# Patient Record
Sex: FEMALE | Race: BLACK OR AFRICAN AMERICAN | NOT HISPANIC OR LATINO
[De-identification: names, ages, dates, MRNs, and addresses within clinical notes are randomized per-mention and may not be internally consistent; named-entity substitution may affect disease eponyms.]

---

## 2017-10-23 ENCOUNTER — TRANSCRIPTION ENCOUNTER (OUTPATIENT)
Age: 77
End: 2017-10-23

## 2017-10-23 ENCOUNTER — EMERGENCY (EMERGENCY)
Facility: HOSPITAL | Age: 77
LOS: 1 days | Discharge: HOME | End: 2017-10-23
Attending: STUDENT IN AN ORGANIZED HEALTH CARE EDUCATION/TRAINING PROGRAM
Payer: MEDICAID

## 2017-10-23 VITALS
HEART RATE: 86 BPM | WEIGHT: 153 LBS | SYSTOLIC BLOOD PRESSURE: 135 MMHG | DIASTOLIC BLOOD PRESSURE: 80 MMHG | OXYGEN SATURATION: 99 % | HEIGHT: 64 IN | TEMPERATURE: 99 F | RESPIRATION RATE: 20 BRPM

## 2017-10-23 LAB
BASE EXCESS BLDV CALC-SCNC: 7.1 MMOL/L — HIGH (ref -3–3)
BLOOD GAS COMMENTS, VENOUS: SIGNIFICANT CHANGE UP
EOSINOPHIL # BLD AUTO: 0 K/UL — SIGNIFICANT CHANGE UP (ref 0–0.5)
EOSINOPHIL NFR BLD AUTO: 0.2 % — SIGNIFICANT CHANGE UP (ref 0–6)
GAS PNL BLDV: SIGNIFICANT CHANGE UP
HCO3 BLDV-SCNC: 30 MMOL/L — HIGH (ref 20–26)
HCT VFR BLD CALC: 36.4 % — LOW (ref 37–47)
HGB BLD-MCNC: 12.5 G/DL — SIGNIFICANT CHANGE UP (ref 12–16)
LYMPHOCYTES # BLD AUTO: 3.7 K/UL — SIGNIFICANT CHANGE UP (ref 1–4.8)
LYMPHOCYTES # BLD AUTO: 45 % — SIGNIFICANT CHANGE UP (ref 20–55)
MCHC RBC-ENTMCNC: 28.9 PG — SIGNIFICANT CHANGE UP (ref 27–31)
MCHC RBC-ENTMCNC: 34.3 G/DL — SIGNIFICANT CHANGE UP (ref 32–36)
MCV RBC AUTO: 84.3 FL — SIGNIFICANT CHANGE UP (ref 81–99)
MONOCYTES # BLD AUTO: 0.6 K/UL — SIGNIFICANT CHANGE UP (ref 0–0.8)
MONOCYTES NFR BLD AUTO: 7.5 % — SIGNIFICANT CHANGE UP (ref 3–10)
NEUTROPHILS # BLD AUTO: 3.8 K/UL — SIGNIFICANT CHANGE UP (ref 1.8–8)
NEUTROPHILS NFR BLD AUTO: 47.2 % — SIGNIFICANT CHANGE UP (ref 37–73)
NT-PROBNP SERPL-SCNC: 117 PG/ML — SIGNIFICANT CHANGE UP (ref 0–300)
PCO2 BLDV: 47 MMHG — SIGNIFICANT CHANGE UP (ref 35–50)
PH BLDV: 7.44 — SIGNIFICANT CHANGE UP (ref 7.35–7.45)
PLATELET # BLD AUTO: 258 K/UL — SIGNIFICANT CHANGE UP (ref 150–400)
PO2 BLDV: 36 MMHG — SIGNIFICANT CHANGE UP (ref 25–45)
RBC # BLD: 4.32 M/UL — LOW (ref 4.4–5.2)
RBC # FLD: 12.6 % — SIGNIFICANT CHANGE UP (ref 11–15.6)
SAO2 % BLDV: 67 % — SIGNIFICANT CHANGE UP (ref 67–88)
WBC # BLD: 8.1 K/UL — SIGNIFICANT CHANGE UP (ref 4.8–10.8)
WBC # FLD AUTO: 8.1 K/UL — SIGNIFICANT CHANGE UP (ref 4.8–10.8)

## 2017-10-23 PROCEDURE — 93010 ELECTROCARDIOGRAM REPORT: CPT

## 2017-10-23 PROCEDURE — 99285 EMERGENCY DEPT VISIT HI MDM: CPT

## 2017-10-23 PROCEDURE — 71010: CPT | Mod: 26

## 2017-10-23 RX ORDER — IPRATROPIUM/ALBUTEROL SULFATE 18-103MCG
3 AEROSOL WITH ADAPTER (GRAM) INHALATION ONCE
Qty: 0 | Refills: 0 | Status: COMPLETED | OUTPATIENT
Start: 2017-10-23 | End: 2017-10-23

## 2017-10-23 RX ADMIN — Medication 3 MILLILITER(S): at 21:57

## 2017-10-23 NOTE — ED PROVIDER NOTE - PROGRESS NOTE DETAILS
Labs and CT are as noted. Patient has been resting comfortably. CT is noted but patient denies and recent signifcant meal ingestion. Given history of worsening cough, increase early satiety, dyspnea, and increase belching patient symptoms secondary to worsening GI condition. Discussed with patient and daughter that symptoms seem to be secondary to GI condition; slow motility. Patient will need to follow-up with GI for further evaluation and management. Labs and CT are as noted. Patient has been resting comfortably. CT is noted but patient denies and recent significant meal ingestion. Given history of worsening cough, increase early satiety, dyspnea, and increase belching patient symptoms secondary to worsening GI condition. Discussed with patient and daughter that symptoms seem to be secondary to GI condition; slow motility. Patient will need to follow-up with GI for further evaluation and management.

## 2017-10-23 NOTE — ED ADULT NURSE NOTE - DISCHARGE TEACHING
performed by jordin pt to f[facility]

## 2017-10-23 NOTE — ED STATDOCS - PROGRESS NOTE DETAILS
77 year old female presenting to the ED complaining of shortness of breath that onset 3 days ago. Pt's family also states that the pt has had a cough for months with posttussive vomiting, but denies having chest pain or dizziness. Her family states that the pt said her breathing worsened at approximately 1900 today. Pt's family also states that the pt has not been eating normally x 1 month from early satiety, but has been drinking normally. As per family, pt has PMHx of thyroid condition and pacemaker placement, but has no hx of COPD, CHF, or fibrosis. Pt states that she has visited her PMD for her sx. She states that she has never smoked. Will transfer to the main ED for further evaluation by another provider.   PMD: Dr. Rose  Cardiologist: Dr. Garner

## 2017-10-23 NOTE — ED PROVIDER NOTE - OBJECTIVE STATEMENT
78 y/o F PMHx pacemaker, DM and HTN presents to ED c/o constant difficulty breathing onset 1700 today. Difficulty breathing is exacerbated with laying down. Was sitting down when this episode onset. States has had difficulty breathing previously just not as bad as today. Daughter reports mother lives alone, when episode onset mother called her she went to her house and brought her to ED. Also states pt has had a cough for months which causes her to vomit. Pt has went to the doctors and was placed on Flonase and nasal spray for cough. On Omeprazole and Lisinopril. Denies cigarette usage, drug usage, N/D, fever, chills, CP, HA, numbness, tingling and abd pain.  PCP: Dr. Rose  Cardiologist:  78 y/o F PMHx pacemaker, DM and HTN presents to ED c/o constant difficulty breathing onset 1700 today. Difficulty breathing is exacerbated with laying down. Was sitting down when this episode onset. States has had difficulty breathing previously just not as bad as today. Daughter reports mother lives alone, when episode onset mother called her she went to her house and brought her to ED. Also states pt has had a cough for months which as times caused vomiting. Pt has went to the doctors and was placed on Flonase and nasal spray for cough.  She also states that she has difficulty with swallowing foods, either causing a sensation of full after a couple of bites or recurrent belching. Also occurring over the past several months. On Omeprazole and Lisinopril. Denies cigarette usage, drug usage, N/D, fever, chills, CP, HA, numbness, tingling and abd pain.  PCP: Dr. Rose  Cardiologist:

## 2017-10-23 NOTE — ED ADULT NURSE NOTE - OBJECTIVE STATEMENT
76y/o female c/o multiple symptoms. Pt states she began to feel SOB this evening. Pt AOx3, resp even unlabored, bilateral clear resp at this time. Pt states to have had episodes of emesis frequently for many weeks. NSR. pt hypertensive. will continue to monitor

## 2017-10-24 VITALS
RESPIRATION RATE: 18 BRPM | OXYGEN SATURATION: 100 % | TEMPERATURE: 98 F | HEART RATE: 93 BPM | SYSTOLIC BLOOD PRESSURE: 142 MMHG | DIASTOLIC BLOOD PRESSURE: 88 MMHG

## 2017-10-24 PROBLEM — Z00.00 ENCOUNTER FOR PREVENTIVE HEALTH EXAMINATION: Status: ACTIVE | Noted: 2017-10-24

## 2017-10-24 LAB
ALBUMIN SERPL ELPH-MCNC: 4.4 G/DL — SIGNIFICANT CHANGE UP (ref 3.3–5.2)
ALP SERPL-CCNC: 97 U/L — SIGNIFICANT CHANGE UP (ref 40–120)
ALT FLD-CCNC: 7 U/L — SIGNIFICANT CHANGE UP
ANION GAP SERPL CALC-SCNC: 16 MMOL/L — SIGNIFICANT CHANGE UP (ref 5–17)
AST SERPL-CCNC: 15 U/L — SIGNIFICANT CHANGE UP
BILIRUB SERPL-MCNC: 0.5 MG/DL — SIGNIFICANT CHANGE UP (ref 0.4–2)
BUN SERPL-MCNC: 11 MG/DL — SIGNIFICANT CHANGE UP (ref 8–20)
CALCIUM SERPL-MCNC: 10.3 MG/DL — HIGH (ref 8.6–10.2)
CHLORIDE SERPL-SCNC: 88 MMOL/L — LOW (ref 98–107)
CO2 SERPL-SCNC: 28 MMOL/L — SIGNIFICANT CHANGE UP (ref 22–29)
CREAT SERPL-MCNC: 0.9 MG/DL — SIGNIFICANT CHANGE UP (ref 0.5–1.3)
GLUCOSE SERPL-MCNC: 171 MG/DL — HIGH (ref 70–115)
LIDOCAIN IGE QN: 27 U/L — SIGNIFICANT CHANGE UP (ref 22–51)
POTASSIUM SERPL-MCNC: 3.6 MMOL/L — SIGNIFICANT CHANGE UP (ref 3.5–5.3)
POTASSIUM SERPL-SCNC: 3.6 MMOL/L — SIGNIFICANT CHANGE UP (ref 3.5–5.3)
PROT SERPL-MCNC: 8.4 G/DL — SIGNIFICANT CHANGE UP (ref 6.6–8.7)
SODIUM SERPL-SCNC: 132 MMOL/L — LOW (ref 135–145)
TROPONIN T SERPL-MCNC: <0.01 NG/ML — SIGNIFICANT CHANGE UP (ref 0–0.06)

## 2017-10-24 PROCEDURE — 83880 ASSAY OF NATRIURETIC PEPTIDE: CPT

## 2017-10-24 PROCEDURE — 71275 CT ANGIOGRAPHY CHEST: CPT

## 2017-10-24 PROCEDURE — 85027 COMPLETE CBC AUTOMATED: CPT

## 2017-10-24 PROCEDURE — 93005 ELECTROCARDIOGRAM TRACING: CPT

## 2017-10-24 PROCEDURE — 71275 CT ANGIOGRAPHY CHEST: CPT | Mod: 26

## 2017-10-24 PROCEDURE — 36415 COLL VENOUS BLD VENIPUNCTURE: CPT

## 2017-10-24 PROCEDURE — 74177 CT ABD & PELVIS W/CONTRAST: CPT

## 2017-10-24 PROCEDURE — 80053 COMPREHEN METABOLIC PANEL: CPT

## 2017-10-24 PROCEDURE — 84484 ASSAY OF TROPONIN QUANT: CPT

## 2017-10-24 PROCEDURE — 83690 ASSAY OF LIPASE: CPT

## 2017-10-24 PROCEDURE — 99284 EMERGENCY DEPT VISIT MOD MDM: CPT | Mod: 25

## 2017-10-24 PROCEDURE — 94640 AIRWAY INHALATION TREATMENT: CPT

## 2017-10-24 PROCEDURE — 71045 X-RAY EXAM CHEST 1 VIEW: CPT

## 2017-10-24 PROCEDURE — 74177 CT ABD & PELVIS W/CONTRAST: CPT | Mod: 26

## 2017-10-24 PROCEDURE — 82803 BLOOD GASES ANY COMBINATION: CPT

## 2017-10-24 RX ORDER — METOCLOPRAMIDE HCL 10 MG
1 TABLET ORAL
Qty: 28 | Refills: 0
Start: 2017-10-24 | End: 2017-10-31

## 2017-10-24 RX ORDER — OMEPRAZOLE 10 MG/1
1 CAPSULE, DELAYED RELEASE ORAL
Qty: 14 | Refills: 0
Start: 2017-10-24 | End: 2017-11-07

## 2019-09-06 PROBLEM — Z95.0 PRESENCE OF CARDIAC PACEMAKER: Chronic | Status: ACTIVE | Noted: 2017-10-25

## 2019-09-06 PROBLEM — E11.9 TYPE 2 DIABETES MELLITUS WITHOUT COMPLICATIONS: Chronic | Status: ACTIVE | Noted: 2017-10-25

## 2019-09-06 PROBLEM — I10 ESSENTIAL (PRIMARY) HYPERTENSION: Chronic | Status: ACTIVE | Noted: 2017-10-25

## 2019-10-04 ENCOUNTER — APPOINTMENT (OUTPATIENT)
Dept: NEPHROLOGY | Facility: CLINIC | Age: 79
End: 2019-10-04
Payer: MEDICARE

## 2019-10-04 VITALS
HEIGHT: 64 IN | WEIGHT: 160 LBS | OXYGEN SATURATION: 99 % | HEART RATE: 70 BPM | SYSTOLIC BLOOD PRESSURE: 164 MMHG | BODY MASS INDEX: 27.31 KG/M2 | DIASTOLIC BLOOD PRESSURE: 70 MMHG

## 2019-10-04 DIAGNOSIS — E11.21 TYPE 2 DIABETES MELLITUS WITH DIABETIC NEPHROPATHY: ICD-10-CM

## 2019-10-04 DIAGNOSIS — Z82.49 FAMILY HISTORY OF ISCHEMIC HEART DISEASE AND OTHER DISEASES OF THE CIRCULATORY SYSTEM: ICD-10-CM

## 2019-10-04 PROCEDURE — 99205 OFFICE O/P NEW HI 60 MIN: CPT | Mod: 25

## 2019-10-04 PROCEDURE — 36415 COLL VENOUS BLD VENIPUNCTURE: CPT

## 2019-10-04 NOTE — PHYSICAL EXAM
[General Appearance - Alert] : alert [General Appearance - In No Acute Distress] : in no acute distress [PERRL With Normal Accommodation] : pupils were equal in size, round, and reactive to light [Sclera] : the sclera and conjunctiva were normal [Extraocular Movements] : extraocular movements were intact [Neck Appearance] : the appearance of the neck was normal [Oropharynx] : the oropharynx was normal [Outer Ear] : the ears and nose were normal in appearance [Neck Cervical Mass (___cm)] : no neck mass was observed [Thyroid Diffuse Enlargement] : the thyroid was not enlarged [Jugular Venous Distention Increased] : there was no jugular-venous distention [Thyroid Nodule] : there were no palpable thyroid nodules [Auscultation Breath Sounds / Voice Sounds] : lungs were clear to auscultation bilaterally [Heart Sounds] : normal S1 and S2 [Heart Rate And Rhythm] : heart rate was normal and rhythm regular [Heart Sounds Gallop] : no gallops [Murmurs] : no murmurs [Full Pulse] : the pedal pulses are present [Heart Sounds Pericardial Friction Rub] : no pericardial rub [Edema] : there was no peripheral edema [Breast Appearance] : normal in appearance [Breast Palpation Mass] : no palpable masses [Bowel Sounds] : normal bowel sounds [Abdomen Soft] : soft [Abdomen Tenderness] : non-tender [Abdomen Mass (___ Cm)] : no abdominal mass palpated [Normal Sphincter Tone] : normal sphincter tone [No Rectal Mass] : no rectal mass [External Female Genitalia] : normal external genitalia [Urethral Meatus] : normal urethra [Urinary Bladder Findings] : the bladder was normal on palpation [Cervical Lymph Nodes Enlarged Posterior Bilaterally] : posterior cervical [Cervical Lymph Nodes Enlarged Anterior Bilaterally] : anterior cervical [Supraclavicular Lymph Nodes Enlarged Bilaterally] : supraclavicular [Axillary Lymph Nodes Enlarged Bilaterally] : axillary [Femoral Lymph Nodes Enlarged Bilaterally] : femoral [Inguinal Lymph Nodes Enlarged Bilaterally] : inguinal [No CVA Tenderness] : no ~M costovertebral angle tenderness [No Spinal Tenderness] : no spinal tenderness [Abnormal Walk] : normal gait [Motor Tone] : muscle strength and tone were normal [Nail Clubbing] : no clubbing  or cyanosis of the fingernails [Musculoskeletal - Swelling] : no joint swelling seen [Skin Color & Pigmentation] : normal skin color and pigmentation [Skin Turgor] : normal skin turgor [] : no rash [Deep Tendon Reflexes (DTR)] : deep tendon reflexes were 2+ and symmetric [Sensation] : the sensory exam was normal to light touch and pinprick [No Focal Deficits] : no focal deficits [Oriented To Time, Place, And Person] : oriented to person, place, and time [Affect] : the affect was normal [Impaired Insight] : insight and judgment were intact [FreeTextEntry1] : + PPM,  [Occult Blood Positive] : stool was negative for occult blood

## 2019-10-04 NOTE — HISTORY OF PRESENT ILLNESS
[FreeTextEntry1] : DM - 2 ,  Ref. here for evaln., of CKD - 3, Proteinuria, \par \par Meds Reviewed,\par \par Lives alone , + PPM,\par \par Thin & Frail,

## 2019-10-06 LAB
25(OH)D3 SERPL-MCNC: 19.8 NG/ML
ALBUMIN SERPL ELPH-MCNC: 4.6 G/DL
ANION GAP SERPL CALC-SCNC: 16 MMOL/L
APPEARANCE: ABNORMAL
BACTERIA: ABNORMAL
BASOPHILS # BLD AUTO: 0 K/UL
BASOPHILS NFR BLD AUTO: 0 %
BILIRUBIN URINE: NEGATIVE
BLOOD URINE: NORMAL
BUN SERPL-MCNC: 12 MG/DL
CALCIUM SERPL-MCNC: 9.9 MG/DL
CALCIUM SERPL-MCNC: 9.9 MG/DL
CHLORIDE SERPL-SCNC: 100 MMOL/L
CO2 SERPL-SCNC: 21 MMOL/L
COLOR: YELLOW
CREAT SERPL-MCNC: 1.05 MG/DL
CREAT SPEC-SCNC: 105 MG/DL
CREAT/PROT UR: 0.2 RATIO
EOSINOPHIL # BLD AUTO: 0.03 K/UL
EOSINOPHIL NFR BLD AUTO: 0.5 %
ESTIMATED AVERAGE GLUCOSE: 137 MG/DL
GLUCOSE QUALITATIVE U: NEGATIVE
GLUCOSE SERPL-MCNC: 233 MG/DL
HBA1C MFR BLD HPLC: 6.4 %
HCT VFR BLD CALC: 37.4 %
HGB BLD-MCNC: 11.9 G/DL
HYALINE CASTS: 0 /LPF
IMM GRANULOCYTES NFR BLD AUTO: 0.2 %
KETONES URINE: NEGATIVE
LEUKOCYTE ESTERASE URINE: ABNORMAL
LYMPHOCYTES # BLD AUTO: 2.14 K/UL
LYMPHOCYTES NFR BLD AUTO: 38.4 %
MAN DIFF?: NORMAL
MCHC RBC-ENTMCNC: 28.5 PG
MCHC RBC-ENTMCNC: 31.8 GM/DL
MCV RBC AUTO: 89.7 FL
MICROSCOPIC-UA: NORMAL
MONOCYTES # BLD AUTO: 0.41 K/UL
MONOCYTES NFR BLD AUTO: 7.3 %
NEUTROPHILS # BLD AUTO: 2.99 K/UL
NEUTROPHILS NFR BLD AUTO: 53.6 %
NITRITE URINE: NEGATIVE
PARATHYROID HORMONE INTACT: 48 PG/ML
PH URINE: 6
PHOSPHATE SERPL-MCNC: 3.3 MG/DL
PLATELET # BLD AUTO: 265 K/UL
POTASSIUM SERPL-SCNC: 3.9 MMOL/L
PROT UR-MCNC: 21 MG/DL
PROTEIN URINE: NORMAL
RBC # BLD: 4.17 M/UL
RBC # FLD: 13.4 %
RED BLOOD CELLS URINE: 5 /HPF
SODIUM SERPL-SCNC: 137 MMOL/L
SPECIFIC GRAVITY URINE: 1.01
SQUAMOUS EPITHELIAL CELLS: 24 /HPF
URINE COMMENTS: NORMAL
UROBILINOGEN URINE: NORMAL
WBC # FLD AUTO: 5.58 K/UL
WHITE BLOOD CELLS URINE: 196 /HPF

## 2019-10-06 RX ORDER — CHOLECALCIFEROL (VITAMIN D3) 1250 MCG
1.25 MG CAPSULE ORAL
Qty: 12 | Refills: 3 | Status: ACTIVE | COMMUNITY
Start: 2019-10-06 | End: 1900-01-01

## 2019-10-08 ENCOUNTER — FORM ENCOUNTER (OUTPATIENT)
Age: 79
End: 2019-10-08

## 2019-10-09 ENCOUNTER — OUTPATIENT (OUTPATIENT)
Dept: OUTPATIENT SERVICES | Facility: HOSPITAL | Age: 79
LOS: 1 days | End: 2019-10-09
Payer: MEDICARE

## 2019-10-09 ENCOUNTER — APPOINTMENT (OUTPATIENT)
Dept: ULTRASOUND IMAGING | Facility: CLINIC | Age: 79
End: 2019-10-09

## 2019-10-09 DIAGNOSIS — E11.21 TYPE 2 DIABETES MELLITUS WITH DIABETIC NEPHROPATHY: ICD-10-CM

## 2019-10-09 PROCEDURE — 76775 US EXAM ABDO BACK WALL LIM: CPT

## 2019-10-09 PROCEDURE — 76775 US EXAM ABDO BACK WALL LIM: CPT | Mod: 26

## 2020-01-01 ENCOUNTER — APPOINTMENT (OUTPATIENT)
Dept: NEPHROLOGY | Facility: CLINIC | Age: 80
End: 2020-01-01
Payer: MEDICARE

## 2020-01-01 ENCOUNTER — INPATIENT (INPATIENT)
Facility: HOSPITAL | Age: 80
LOS: 7 days | DRG: 643 | End: 2020-11-22
Attending: INTERNAL MEDICINE | Admitting: INTERNAL MEDICINE
Payer: MEDICARE

## 2020-01-01 ENCOUNTER — APPOINTMENT (OUTPATIENT)
Dept: NEPHROLOGY | Facility: CLINIC | Age: 80
End: 2020-01-01

## 2020-01-01 VITALS
HEART RATE: 75 BPM | DIASTOLIC BLOOD PRESSURE: 83 MMHG | HEIGHT: 64 IN | BODY MASS INDEX: 28.34 KG/M2 | SYSTOLIC BLOOD PRESSURE: 157 MMHG | WEIGHT: 166 LBS

## 2020-01-01 VITALS
DIASTOLIC BLOOD PRESSURE: 112 MMHG | HEART RATE: 156 BPM | RESPIRATION RATE: 18 BRPM | TEMPERATURE: 97 F | OXYGEN SATURATION: 96 % | SYSTOLIC BLOOD PRESSURE: 157 MMHG | HEIGHT: 64 IN

## 2020-01-01 DIAGNOSIS — I10 ESSENTIAL (PRIMARY) HYPERTENSION: ICD-10-CM

## 2020-01-01 DIAGNOSIS — E05.91 THYROTOXICOSIS, UNSPECIFIED WITH THYROTOXIC CRISIS OR STORM: ICD-10-CM

## 2020-01-01 DIAGNOSIS — N18.3 CHRONIC KIDNEY DISEASE, STAGE 3 (MODERATE): ICD-10-CM

## 2020-01-01 DIAGNOSIS — R94.4 ABNORMAL RESULTS OF KIDNEY FUNCTION STUDIES: ICD-10-CM

## 2020-01-01 DIAGNOSIS — R74.01 ELEVATION OF LEVELS OF LIVER TRANSAMINASE LEVELS: ICD-10-CM

## 2020-01-01 DIAGNOSIS — E11.21 TYPE 2 DIABETES MELLITUS WITH DIABETIC NEPHROPATHY: ICD-10-CM

## 2020-01-01 DIAGNOSIS — E87.2 ACIDOSIS: ICD-10-CM

## 2020-01-01 DIAGNOSIS — R57.9 SHOCK, UNSPECIFIED: ICD-10-CM

## 2020-01-01 DIAGNOSIS — J69.0 PNEUMONITIS DUE TO INHALATION OF FOOD AND VOMIT: ICD-10-CM

## 2020-01-01 DIAGNOSIS — N17.0 ACUTE KIDNEY FAILURE WITH TUBULAR NECROSIS: ICD-10-CM

## 2020-01-01 DIAGNOSIS — G93.40 ENCEPHALOPATHY, UNSPECIFIED: ICD-10-CM

## 2020-01-01 DIAGNOSIS — R73.9 HYPERGLYCEMIA, UNSPECIFIED: ICD-10-CM

## 2020-01-01 DIAGNOSIS — Z87.440 PERSONAL HISTORY OF URINARY (TRACT) INFECTIONS: ICD-10-CM

## 2020-01-01 DIAGNOSIS — J96.01 ACUTE RESPIRATORY FAILURE WITH HYPOXIA: ICD-10-CM

## 2020-01-01 DIAGNOSIS — E55.9 VITAMIN D DEFICIENCY, UNSPECIFIED: ICD-10-CM

## 2020-01-01 LAB
25(OH)D3 SERPL-MCNC: 25.3 NG/ML
A1C WITH ESTIMATED AVERAGE GLUCOSE RESULT: 4.9 % — SIGNIFICANT CHANGE UP (ref 4–5.6)
ABO RH CONFIRMATION: SIGNIFICANT CHANGE UP
ACANTHOCYTES BLD QL SMEAR: SLIGHT — SIGNIFICANT CHANGE UP
ACANTHOCYTES BLD QL SMEAR: SLIGHT — SIGNIFICANT CHANGE UP
ALBUMIN SERPL ELPH-MCNC: 1.9 G/DL — LOW (ref 3.3–5.2)
ALBUMIN SERPL ELPH-MCNC: 2 G/DL — LOW (ref 3.3–5.2)
ALBUMIN SERPL ELPH-MCNC: 2.5 G/DL — LOW (ref 3.3–5.2)
ALBUMIN SERPL ELPH-MCNC: 2.6 G/DL — LOW (ref 3.3–5.2)
ALBUMIN SERPL ELPH-MCNC: 2.9 G/DL — LOW (ref 3.3–5.2)
ALBUMIN SERPL ELPH-MCNC: 4.1 G/DL — SIGNIFICANT CHANGE UP (ref 3.3–5.2)
ALBUMIN SERPL ELPH-MCNC: 4.5 G/DL
ALP SERPL-CCNC: 173 U/L — HIGH (ref 40–120)
ALP SERPL-CCNC: 226 U/L — HIGH (ref 40–120)
ALP SERPL-CCNC: 76 U/L — SIGNIFICANT CHANGE UP (ref 40–120)
ALP SERPL-CCNC: 79 U/L — SIGNIFICANT CHANGE UP (ref 40–120)
ALP SERPL-CCNC: 96 U/L — SIGNIFICANT CHANGE UP (ref 40–120)
ALP SERPL-CCNC: 99 U/L — SIGNIFICANT CHANGE UP (ref 40–120)
ALT FLD-CCNC: 1230 U/L — HIGH
ALT FLD-CCNC: 1234 U/L — HIGH
ALT FLD-CCNC: 139 U/L — HIGH
ALT FLD-CCNC: 161 U/L — HIGH
ALT FLD-CCNC: 22 U/L — SIGNIFICANT CHANGE UP
ALT FLD-CCNC: 280 U/L — HIGH
AMMONIA BLD-MCNC: 31 UMOL/L — SIGNIFICANT CHANGE UP (ref 11–55)
AMMONIA BLD-MCNC: 51 UMOL/L — SIGNIFICANT CHANGE UP (ref 11–55)
ANION GAP SERPL CALC-SCNC: 11 MMOL/L — SIGNIFICANT CHANGE UP (ref 5–17)
ANION GAP SERPL CALC-SCNC: 11 MMOL/L — SIGNIFICANT CHANGE UP (ref 5–17)
ANION GAP SERPL CALC-SCNC: 12 MMOL/L — SIGNIFICANT CHANGE UP (ref 5–17)
ANION GAP SERPL CALC-SCNC: 15 MMOL/L — SIGNIFICANT CHANGE UP (ref 5–17)
ANION GAP SERPL CALC-SCNC: 16 MMOL/L — SIGNIFICANT CHANGE UP (ref 5–17)
ANION GAP SERPL CALC-SCNC: 18 MMOL/L — HIGH (ref 5–17)
ANION GAP SERPL CALC-SCNC: 19 MMOL/L — HIGH (ref 5–17)
ANION GAP SERPL CALC-SCNC: 20 MMOL/L
ANION GAP SERPL CALC-SCNC: 23 MMOL/L — HIGH (ref 5–17)
ANION GAP SERPL CALC-SCNC: 6 MMOL/L — SIGNIFICANT CHANGE UP (ref 5–17)
ANION GAP SERPL CALC-SCNC: 7 MMOL/L — SIGNIFICANT CHANGE UP (ref 5–17)
ANION GAP SERPL CALC-SCNC: 9 MMOL/L — SIGNIFICANT CHANGE UP (ref 5–17)
ANION GAP SERPL CALC-SCNC: 9 MMOL/L — SIGNIFICANT CHANGE UP (ref 5–17)
ANISOCYTOSIS BLD QL: SLIGHT — SIGNIFICANT CHANGE UP
APPEARANCE UR: CLEAR — SIGNIFICANT CHANGE UP
APPEARANCE UR: CLEAR — SIGNIFICANT CHANGE UP
APPEARANCE: CLEAR
APTT BLD: 21.7 SEC — LOW (ref 27.5–35.5)
APTT BLD: 23 SEC — LOW (ref 27.5–35.5)
APTT BLD: 24.1 SEC — LOW (ref 27.5–35.5)
APTT BLD: 30.4 SEC — SIGNIFICANT CHANGE UP (ref 27.5–35.5)
APTT BLD: 32.4 SEC — SIGNIFICANT CHANGE UP (ref 27.5–35.5)
AST SERPL-CCNC: 1330 U/L — HIGH
AST SERPL-CCNC: 30 U/L — SIGNIFICANT CHANGE UP
AST SERPL-CCNC: 4526 U/L — HIGH
AST SERPL-CCNC: 60 U/L — HIGH
AST SERPL-CCNC: 84 U/L — HIGH
AST SERPL-CCNC: 841 U/L — HIGH
BACTERIA # UR AUTO: ABNORMAL
BACTERIA: ABNORMAL
BASE EXCESS BLDA CALC-SCNC: -7.8 MMOL/L — LOW (ref -2–2)
BASE EXCESS BLDA CALC-SCNC: 2.4 MMOL/L — HIGH (ref -2–2)
BASE EXCESS BLDA CALC-SCNC: 6.2 MMOL/L — HIGH (ref -3–3)
BASOPHILS # BLD AUTO: 0 K/UL — SIGNIFICANT CHANGE UP (ref 0–0.2)
BASOPHILS # BLD AUTO: 0.01 K/UL — SIGNIFICANT CHANGE UP (ref 0–0.2)
BASOPHILS # BLD AUTO: 0.02 K/UL
BASOPHILS NFR BLD AUTO: 0 % — SIGNIFICANT CHANGE UP (ref 0–2)
BASOPHILS NFR BLD AUTO: 0.1 % — SIGNIFICANT CHANGE UP (ref 0–2)
BASOPHILS NFR BLD AUTO: 0.4 %
BILIRUB DIRECT SERPL-MCNC: 2.7 MG/DL — HIGH (ref 0–0.3)
BILIRUB INDIRECT FLD-MCNC: 1.1 MG/DL — HIGH (ref 0.2–1)
BILIRUB SERPL-MCNC: 0.9 MG/DL — SIGNIFICANT CHANGE UP (ref 0.4–2)
BILIRUB SERPL-MCNC: 1.2 MG/DL — SIGNIFICANT CHANGE UP (ref 0.4–2)
BILIRUB SERPL-MCNC: 1.4 MG/DL — SIGNIFICANT CHANGE UP (ref 0.4–2)
BILIRUB SERPL-MCNC: 1.5 MG/DL — SIGNIFICANT CHANGE UP (ref 0.4–2)
BILIRUB SERPL-MCNC: 2.1 MG/DL — HIGH (ref 0.4–2)
BILIRUB SERPL-MCNC: 3.8 MG/DL — HIGH (ref 0.4–2)
BILIRUB UR-MCNC: NEGATIVE — SIGNIFICANT CHANGE UP
BILIRUB UR-MCNC: NEGATIVE — SIGNIFICANT CHANGE UP
BILIRUBIN URINE: NEGATIVE
BLD GP AB SCN SERPL QL: SIGNIFICANT CHANGE UP
BLOOD GAS COMMENTS ARTERIAL: SIGNIFICANT CHANGE UP
BLOOD URINE: NEGATIVE
BUN SERPL-MCNC: 24 MG/DL — HIGH (ref 8–20)
BUN SERPL-MCNC: 27 MG/DL — HIGH (ref 8–20)
BUN SERPL-MCNC: 40 MG/DL — HIGH (ref 8–20)
BUN SERPL-MCNC: 59 MG/DL — HIGH (ref 8–20)
BUN SERPL-MCNC: 67 MG/DL — HIGH (ref 8–20)
BUN SERPL-MCNC: 74 MG/DL — HIGH (ref 8–20)
BUN SERPL-MCNC: 8 MG/DL
BUN SERPL-MCNC: 80 MG/DL — HIGH (ref 8–20)
BUN SERPL-MCNC: 83 MG/DL — HIGH (ref 8–20)
BUN SERPL-MCNC: 85 MG/DL — HIGH (ref 8–20)
BUN SERPL-MCNC: 86 MG/DL — HIGH (ref 8–20)
BUN SERPL-MCNC: 88 MG/DL — HIGH (ref 8–20)
BUN SERPL-MCNC: 92 MG/DL — HIGH (ref 8–20)
BURR CELLS BLD QL SMEAR: PRESENT — SIGNIFICANT CHANGE UP
C DIFF BY PCR RESULT: SIGNIFICANT CHANGE UP
C DIFF TOX GENS STL QL NAA+PROBE: SIGNIFICANT CHANGE UP
C NEOFORM RRNA SPEC NAA+PROBE-ACNC: SIGNIFICANT CHANGE UP
CALCIUM SERPL-MCNC: 10.1 MG/DL — SIGNIFICANT CHANGE UP (ref 8.6–10.2)
CALCIUM SERPL-MCNC: 10.5 MG/DL — HIGH (ref 8.6–10.2)
CALCIUM SERPL-MCNC: 11.8 MG/DL — HIGH (ref 8.6–10.2)
CALCIUM SERPL-MCNC: 8.3 MG/DL — LOW (ref 8.6–10.2)
CALCIUM SERPL-MCNC: 8.5 MG/DL — LOW (ref 8.6–10.2)
CALCIUM SERPL-MCNC: 8.6 MG/DL — SIGNIFICANT CHANGE UP (ref 8.6–10.2)
CALCIUM SERPL-MCNC: 8.9 MG/DL — SIGNIFICANT CHANGE UP (ref 8.6–10.2)
CALCIUM SERPL-MCNC: 9 MG/DL — SIGNIFICANT CHANGE UP (ref 8.6–10.2)
CALCIUM SERPL-MCNC: 9.2 MG/DL — SIGNIFICANT CHANGE UP (ref 8.6–10.2)
CALCIUM SERPL-MCNC: 9.3 MG/DL — SIGNIFICANT CHANGE UP (ref 8.6–10.2)
CALCIUM SERPL-MCNC: 9.9 MG/DL
CALCIUM SERPL-MCNC: 9.9 MG/DL
CHLORIDE SERPL-SCNC: 101 MMOL/L
CHLORIDE SERPL-SCNC: 104 MMOL/L — SIGNIFICANT CHANGE UP (ref 98–107)
CHLORIDE SERPL-SCNC: 104 MMOL/L — SIGNIFICANT CHANGE UP (ref 98–107)
CHLORIDE SERPL-SCNC: 105 MMOL/L — SIGNIFICANT CHANGE UP (ref 98–107)
CHLORIDE SERPL-SCNC: 106 MMOL/L — SIGNIFICANT CHANGE UP (ref 98–107)
CHLORIDE SERPL-SCNC: 107 MMOL/L — SIGNIFICANT CHANGE UP (ref 98–107)
CHLORIDE SERPL-SCNC: 108 MMOL/L — HIGH (ref 98–107)
CHLORIDE SERPL-SCNC: 109 MMOL/L — HIGH (ref 98–107)
CHLORIDE SERPL-SCNC: 112 MMOL/L — HIGH (ref 98–107)
CHLORIDE SERPL-SCNC: 113 MMOL/L — HIGH (ref 98–107)
CHLORIDE SERPL-SCNC: 114 MMOL/L — HIGH (ref 98–107)
CHOLEST SERPL-MCNC: 59 MG/DL — SIGNIFICANT CHANGE UP
CK MB CFR SERPL CALC: 1.3 NG/ML — SIGNIFICANT CHANGE UP (ref 0–6.7)
CK SERPL-CCNC: 147 U/L — SIGNIFICANT CHANGE UP (ref 25–170)
CK SERPL-CCNC: 205 U/L — HIGH (ref 25–170)
CMV DNA CSF QL NAA+PROBE: SIGNIFICANT CHANGE UP
CO2 SERPL-SCNC: 16 MMOL/L — LOW (ref 22–29)
CO2 SERPL-SCNC: 16 MMOL/L — LOW (ref 22–29)
CO2 SERPL-SCNC: 17 MMOL/L
CO2 SERPL-SCNC: 19 MMOL/L — LOW (ref 22–29)
CO2 SERPL-SCNC: 21 MMOL/L — LOW (ref 22–29)
CO2 SERPL-SCNC: 25 MMOL/L — SIGNIFICANT CHANGE UP (ref 22–29)
CO2 SERPL-SCNC: 27 MMOL/L — SIGNIFICANT CHANGE UP (ref 22–29)
CO2 SERPL-SCNC: 28 MMOL/L — SIGNIFICANT CHANGE UP (ref 22–29)
CO2 SERPL-SCNC: 28 MMOL/L — SIGNIFICANT CHANGE UP (ref 22–29)
CO2 SERPL-SCNC: 29 MMOL/L — SIGNIFICANT CHANGE UP (ref 22–29)
CO2 SERPL-SCNC: 30 MMOL/L — HIGH (ref 22–29)
COLOR SPEC: YELLOW — SIGNIFICANT CHANGE UP
COLOR SPEC: YELLOW — SIGNIFICANT CHANGE UP
COLOR: NORMAL
CREAT SERPL-MCNC: 0.49 MG/DL — LOW (ref 0.5–1.3)
CREAT SERPL-MCNC: 0.62 MG/DL — SIGNIFICANT CHANGE UP (ref 0.5–1.3)
CREAT SERPL-MCNC: 0.63 MG/DL — SIGNIFICANT CHANGE UP (ref 0.5–1.3)
CREAT SERPL-MCNC: 0.63 MG/DL — SIGNIFICANT CHANGE UP (ref 0.5–1.3)
CREAT SERPL-MCNC: 0.68 MG/DL — SIGNIFICANT CHANGE UP (ref 0.5–1.3)
CREAT SERPL-MCNC: 0.71 MG/DL — SIGNIFICANT CHANGE UP (ref 0.5–1.3)
CREAT SERPL-MCNC: 0.74 MG/DL — SIGNIFICANT CHANGE UP (ref 0.5–1.3)
CREAT SERPL-MCNC: 0.9 MG/DL
CREAT SERPL-MCNC: 0.93 MG/DL — SIGNIFICANT CHANGE UP (ref 0.5–1.3)
CREAT SERPL-MCNC: 1.24 MG/DL — SIGNIFICANT CHANGE UP (ref 0.5–1.3)
CREAT SERPL-MCNC: 1.36 MG/DL — HIGH (ref 0.5–1.3)
CREAT SERPL-MCNC: 1.93 MG/DL — HIGH (ref 0.5–1.3)
CREAT SERPL-MCNC: 1.93 MG/DL — HIGH (ref 0.5–1.3)
CREAT SPEC-SCNC: 61 MG/DL
CREAT/PROT UR: 0.1 RATIO
CSF PCR RESULT: SIGNIFICANT CHANGE UP
CULTURE RESULTS: SIGNIFICANT CHANGE UP
DACRYOCYTES BLD QL SMEAR: SLIGHT — SIGNIFICANT CHANGE UP
DIFF PNL FLD: ABNORMAL
DIFF PNL FLD: ABNORMAL
E COLI K1 DNA CSF QL NAA+NON-PROBE: SIGNIFICANT CHANGE UP
ELLIPTOCYTES BLD QL SMEAR: SLIGHT — SIGNIFICANT CHANGE UP
EOSINOPHIL # BLD AUTO: 0 K/UL — SIGNIFICANT CHANGE UP (ref 0–0.5)
EOSINOPHIL # BLD AUTO: 0.03 K/UL
EOSINOPHIL # BLD AUTO: 0.22 K/UL — SIGNIFICANT CHANGE UP (ref 0–0.5)
EOSINOPHIL # BLD AUTO: 0.25 K/UL — SIGNIFICANT CHANGE UP (ref 0–0.5)
EOSINOPHIL NFR BLD AUTO: 0 % — SIGNIFICANT CHANGE UP (ref 0–6)
EOSINOPHIL NFR BLD AUTO: 0.6 %
EOSINOPHIL NFR BLD AUTO: 0.9 % — SIGNIFICANT CHANGE UP (ref 0–6)
EOSINOPHIL NFR BLD AUTO: 2.8 % — SIGNIFICANT CHANGE UP (ref 0–6)
EPI CELLS # UR: ABNORMAL
EPI CELLS # UR: SIGNIFICANT CHANGE UP
ESCHERICHIA COLI K1: SIGNIFICANT CHANGE UP
ESTIMATED AVERAGE GLUCOSE: 189 MG/DL
ESTIMATED AVERAGE GLUCOSE: 94 MG/DL — SIGNIFICANT CHANGE UP (ref 68–114)
EV RNA CSF QL NAA+PROBE: SIGNIFICANT CHANGE UP
GAS PNL BLDA: SIGNIFICANT CHANGE UP
GIANT PLATELETS BLD QL SMEAR: PRESENT — SIGNIFICANT CHANGE UP
GLUCOSE BLDC GLUCOMTR-MCNC: 104 MG/DL — HIGH (ref 70–99)
GLUCOSE BLDC GLUCOMTR-MCNC: 107 MG/DL — HIGH (ref 70–99)
GLUCOSE BLDC GLUCOMTR-MCNC: 108 MG/DL — HIGH (ref 70–99)
GLUCOSE BLDC GLUCOMTR-MCNC: 118 MG/DL — HIGH (ref 70–99)
GLUCOSE BLDC GLUCOMTR-MCNC: 121 MG/DL — HIGH (ref 70–99)
GLUCOSE BLDC GLUCOMTR-MCNC: 124 MG/DL — HIGH (ref 70–99)
GLUCOSE BLDC GLUCOMTR-MCNC: 127 MG/DL — HIGH (ref 70–99)
GLUCOSE BLDC GLUCOMTR-MCNC: 134 MG/DL — HIGH (ref 70–99)
GLUCOSE BLDC GLUCOMTR-MCNC: 138 MG/DL — HIGH (ref 70–99)
GLUCOSE BLDC GLUCOMTR-MCNC: 144 MG/DL — HIGH (ref 70–99)
GLUCOSE BLDC GLUCOMTR-MCNC: 144 MG/DL — HIGH (ref 70–99)
GLUCOSE BLDC GLUCOMTR-MCNC: 147 MG/DL — HIGH (ref 70–99)
GLUCOSE BLDC GLUCOMTR-MCNC: 147 MG/DL — HIGH (ref 70–99)
GLUCOSE BLDC GLUCOMTR-MCNC: 148 MG/DL — HIGH (ref 70–99)
GLUCOSE BLDC GLUCOMTR-MCNC: 148 MG/DL — HIGH (ref 70–99)
GLUCOSE BLDC GLUCOMTR-MCNC: 149 MG/DL — HIGH (ref 70–99)
GLUCOSE BLDC GLUCOMTR-MCNC: 155 MG/DL — HIGH (ref 70–99)
GLUCOSE BLDC GLUCOMTR-MCNC: 156 MG/DL — HIGH (ref 70–99)
GLUCOSE BLDC GLUCOMTR-MCNC: 156 MG/DL — HIGH (ref 70–99)
GLUCOSE BLDC GLUCOMTR-MCNC: 157 MG/DL — HIGH (ref 70–99)
GLUCOSE BLDC GLUCOMTR-MCNC: 157 MG/DL — HIGH (ref 70–99)
GLUCOSE BLDC GLUCOMTR-MCNC: 158 MG/DL — HIGH (ref 70–99)
GLUCOSE BLDC GLUCOMTR-MCNC: 160 MG/DL — HIGH (ref 70–99)
GLUCOSE BLDC GLUCOMTR-MCNC: 161 MG/DL — HIGH (ref 70–99)
GLUCOSE BLDC GLUCOMTR-MCNC: 161 MG/DL — HIGH (ref 70–99)
GLUCOSE BLDC GLUCOMTR-MCNC: 162 MG/DL — HIGH (ref 70–99)
GLUCOSE BLDC GLUCOMTR-MCNC: 163 MG/DL — HIGH (ref 70–99)
GLUCOSE BLDC GLUCOMTR-MCNC: 167 MG/DL — HIGH (ref 70–99)
GLUCOSE BLDC GLUCOMTR-MCNC: 169 MG/DL — HIGH (ref 70–99)
GLUCOSE BLDC GLUCOMTR-MCNC: 170 MG/DL — HIGH (ref 70–99)
GLUCOSE BLDC GLUCOMTR-MCNC: 170 MG/DL — HIGH (ref 70–99)
GLUCOSE BLDC GLUCOMTR-MCNC: 171 MG/DL — HIGH (ref 70–99)
GLUCOSE BLDC GLUCOMTR-MCNC: 172 MG/DL — HIGH (ref 70–99)
GLUCOSE BLDC GLUCOMTR-MCNC: 172 MG/DL — HIGH (ref 70–99)
GLUCOSE BLDC GLUCOMTR-MCNC: 177 MG/DL — HIGH (ref 70–99)
GLUCOSE BLDC GLUCOMTR-MCNC: 182 MG/DL — HIGH (ref 70–99)
GLUCOSE BLDC GLUCOMTR-MCNC: 185 MG/DL — HIGH (ref 70–99)
GLUCOSE BLDC GLUCOMTR-MCNC: 190 MG/DL — HIGH (ref 70–99)
GLUCOSE BLDC GLUCOMTR-MCNC: 193 MG/DL — HIGH (ref 70–99)
GLUCOSE BLDC GLUCOMTR-MCNC: 194 MG/DL — HIGH (ref 70–99)
GLUCOSE BLDC GLUCOMTR-MCNC: 195 MG/DL — HIGH (ref 70–99)
GLUCOSE BLDC GLUCOMTR-MCNC: 196 MG/DL — HIGH (ref 70–99)
GLUCOSE BLDC GLUCOMTR-MCNC: 197 MG/DL — HIGH (ref 70–99)
GLUCOSE BLDC GLUCOMTR-MCNC: 201 MG/DL — HIGH (ref 70–99)
GLUCOSE BLDC GLUCOMTR-MCNC: 204 MG/DL — HIGH (ref 70–99)
GLUCOSE BLDC GLUCOMTR-MCNC: 207 MG/DL — HIGH (ref 70–99)
GLUCOSE BLDC GLUCOMTR-MCNC: 214 MG/DL — HIGH (ref 70–99)
GLUCOSE BLDC GLUCOMTR-MCNC: 216 MG/DL — HIGH (ref 70–99)
GLUCOSE BLDC GLUCOMTR-MCNC: 222 MG/DL — HIGH (ref 70–99)
GLUCOSE BLDC GLUCOMTR-MCNC: 234 MG/DL — HIGH (ref 70–99)
GLUCOSE BLDC GLUCOMTR-MCNC: 239 MG/DL — HIGH (ref 70–99)
GLUCOSE BLDC GLUCOMTR-MCNC: 242 MG/DL — HIGH (ref 70–99)
GLUCOSE BLDC GLUCOMTR-MCNC: 253 MG/DL — HIGH (ref 70–99)
GLUCOSE BLDC GLUCOMTR-MCNC: 255 MG/DL — HIGH (ref 70–99)
GLUCOSE BLDC GLUCOMTR-MCNC: 257 MG/DL — HIGH (ref 70–99)
GLUCOSE BLDC GLUCOMTR-MCNC: 264 MG/DL — HIGH (ref 70–99)
GLUCOSE BLDC GLUCOMTR-MCNC: 265 MG/DL — HIGH (ref 70–99)
GLUCOSE BLDC GLUCOMTR-MCNC: 267 MG/DL — HIGH (ref 70–99)
GLUCOSE BLDC GLUCOMTR-MCNC: 269 MG/DL — HIGH (ref 70–99)
GLUCOSE BLDC GLUCOMTR-MCNC: 288 MG/DL — HIGH (ref 70–99)
GLUCOSE BLDC GLUCOMTR-MCNC: 288 MG/DL — HIGH (ref 70–99)
GLUCOSE BLDC GLUCOMTR-MCNC: 289 MG/DL — HIGH (ref 70–99)
GLUCOSE BLDC GLUCOMTR-MCNC: 307 MG/DL — HIGH (ref 70–99)
GLUCOSE BLDC GLUCOMTR-MCNC: 336 MG/DL — HIGH (ref 70–99)
GLUCOSE BLDC GLUCOMTR-MCNC: 341 MG/DL — HIGH (ref 70–99)
GLUCOSE BLDC GLUCOMTR-MCNC: 347 MG/DL — HIGH (ref 70–99)
GLUCOSE BLDC GLUCOMTR-MCNC: 363 MG/DL — HIGH (ref 70–99)
GLUCOSE BLDC GLUCOMTR-MCNC: 403 MG/DL — HIGH (ref 70–99)
GLUCOSE BLDC GLUCOMTR-MCNC: 85 MG/DL — SIGNIFICANT CHANGE UP (ref 70–99)
GLUCOSE BLDC GLUCOMTR-MCNC: 99 MG/DL — SIGNIFICANT CHANGE UP (ref 70–99)
GLUCOSE CSF-MCNC: 90 MG/DLG/24H — HIGH (ref 40–70)
GLUCOSE QUALITATIVE U: ABNORMAL
GLUCOSE SERPL-MCNC: 117 MG/DL — HIGH (ref 70–99)
GLUCOSE SERPL-MCNC: 120 MG/DL — HIGH (ref 70–99)
GLUCOSE SERPL-MCNC: 136 MG/DL — HIGH (ref 70–99)
GLUCOSE SERPL-MCNC: 140 MG/DL — HIGH (ref 70–99)
GLUCOSE SERPL-MCNC: 152 MG/DL — HIGH (ref 70–99)
GLUCOSE SERPL-MCNC: 158 MG/DL — HIGH (ref 70–99)
GLUCOSE SERPL-MCNC: 201 MG/DL — HIGH (ref 70–99)
GLUCOSE SERPL-MCNC: 219 MG/DL — HIGH (ref 70–99)
GLUCOSE SERPL-MCNC: 221 MG/DL — HIGH (ref 70–99)
GLUCOSE SERPL-MCNC: 250 MG/DL — HIGH (ref 70–99)
GLUCOSE SERPL-MCNC: 259 MG/DL — HIGH (ref 70–99)
GLUCOSE SERPL-MCNC: 355 MG/DL
GLUCOSE SERPL-MCNC: 375 MG/DL — HIGH (ref 70–99)
GLUCOSE UR QL: NEGATIVE MG/DL — SIGNIFICANT CHANGE UP
GLUCOSE UR QL: NEGATIVE MG/DL — SIGNIFICANT CHANGE UP
GP B STREP DNA SPEC QL NAA+PROBE: SIGNIFICANT CHANGE UP
GRAM STN FLD: SIGNIFICANT CHANGE UP
HAEM INFLU DNA SPEC QL NAA+PROBE: SIGNIFICANT CHANGE UP
HBA1C MFR BLD HPLC: 8.2 %
HCO3 BLDA-SCNC: 18 MMOL/L — LOW (ref 20–26)
HCO3 BLDA-SCNC: 27 MMOL/L — HIGH (ref 20–26)
HCO3 BLDA-SCNC: 30 MMOL/L — HIGH (ref 20–26)
HCT VFR BLD CALC: 22 % — LOW (ref 34.5–45)
HCT VFR BLD CALC: 23.3 % — LOW (ref 34.5–45)
HCT VFR BLD CALC: 23.8 % — LOW (ref 34.5–45)
HCT VFR BLD CALC: 25.6 % — LOW (ref 34.5–45)
HCT VFR BLD CALC: 27 % — LOW (ref 34.5–45)
HCT VFR BLD CALC: 28.4 % — LOW (ref 34.5–45)
HCT VFR BLD CALC: 29.1 % — LOW (ref 34.5–45)
HCT VFR BLD CALC: 29.4 % — LOW (ref 34.5–45)
HCT VFR BLD CALC: 34.7 % — SIGNIFICANT CHANGE UP (ref 34.5–45)
HCT VFR BLD CALC: 36.2 % — SIGNIFICANT CHANGE UP (ref 34.5–45)
HCT VFR BLD CALC: 36.4 %
HCT VFR BLD CALC: 40.1 % — SIGNIFICANT CHANGE UP (ref 34.5–45)
HDLC SERPL-MCNC: 27 MG/DL — LOW
HGB BLD-MCNC: 10.9 G/DL — LOW (ref 11.5–15.5)
HGB BLD-MCNC: 11 G/DL — LOW (ref 11.5–15.5)
HGB BLD-MCNC: 11.6 G/DL
HGB BLD-MCNC: 12.6 G/DL — SIGNIFICANT CHANGE UP (ref 11.5–15.5)
HGB BLD-MCNC: 6.5 G/DL — CRITICAL LOW (ref 11.5–15.5)
HGB BLD-MCNC: 7.3 G/DL — LOW (ref 11.5–15.5)
HGB BLD-MCNC: 7.3 G/DL — LOW (ref 11.5–15.5)
HGB BLD-MCNC: 7.8 G/DL — LOW (ref 11.5–15.5)
HGB BLD-MCNC: 8.7 G/DL — LOW (ref 11.5–15.5)
HGB BLD-MCNC: 8.9 G/DL — LOW (ref 11.5–15.5)
HGB BLD-MCNC: 9.1 G/DL — LOW (ref 11.5–15.5)
HGB BLD-MCNC: 9.1 G/DL — LOW (ref 11.5–15.5)
HHV6 DNA CSF QL NAA+PROBE: SIGNIFICANT CHANGE UP
HOROWITZ INDEX BLDA+IHG-RTO: 0.4 — SIGNIFICANT CHANGE UP
HOROWITZ INDEX BLDA+IHG-RTO: 100 — SIGNIFICANT CHANGE UP
HOROWITZ INDEX BLDA+IHG-RTO: SIGNIFICANT CHANGE UP
HSV1 DNA CSF QL NAA+PROBE: SIGNIFICANT CHANGE UP
HSV2 DNA CSF QL NAA+PROBE: SIGNIFICANT CHANGE UP
HYALINE CASTS: 2 /LPF
IMM GRANULOCYTES NFR BLD AUTO: 0.2 %
IMM GRANULOCYTES NFR BLD AUTO: 0.3 % — SIGNIFICANT CHANGE UP (ref 0–1.5)
IMM GRANULOCYTES NFR BLD AUTO: 0.4 % — SIGNIFICANT CHANGE UP (ref 0–1.5)
INR BLD: 1.43 RATIO — HIGH (ref 0.88–1.16)
INR BLD: 1.46 RATIO — HIGH (ref 0.88–1.16)
INR BLD: 1.51 RATIO — HIGH (ref 0.88–1.16)
INR BLD: 1.57 RATIO — HIGH (ref 0.88–1.16)
INR BLD: 1.97 RATIO — HIGH (ref 0.88–1.16)
INR BLD: 2.76 RATIO — HIGH (ref 0.88–1.16)
KETONES UR-MCNC: ABNORMAL
KETONES UR-MCNC: NEGATIVE — SIGNIFICANT CHANGE UP
KETONES URINE: NEGATIVE
L MONOCYTOG DNA SPEC QL NAA+PROBE: SIGNIFICANT CHANGE UP
LACTATE BLDV-MCNC: 3.6 MMOL/L — HIGH (ref 0.5–2)
LACTATE SERPL-SCNC: 2.8 MMOL/L — HIGH (ref 0.5–2)
LDH CSF L TO P-CCNC: 30 U/L — SIGNIFICANT CHANGE UP
LDH FLD-CCNC: 30 U/L — SIGNIFICANT CHANGE UP
LEGIONELLA AG UR QL: NEGATIVE — SIGNIFICANT CHANGE UP
LEUKOCYTE ESTERASE UR-ACNC: ABNORMAL
LEUKOCYTE ESTERASE UR-ACNC: NEGATIVE — SIGNIFICANT CHANGE UP
LEUKOCYTE ESTERASE URINE: NEGATIVE
LEVETIRACETAM SERPL-MCNC: 10.4 UG/ML — SIGNIFICANT CHANGE UP (ref 10–40)
LIPID PNL WITH DIRECT LDL SERPL: 5 MG/DL — SIGNIFICANT CHANGE UP
LYMPHOCYTES # BLD AUTO: 0.81 K/UL — LOW (ref 1–3.3)
LYMPHOCYTES # BLD AUTO: 0.99 K/UL — LOW (ref 1–3.3)
LYMPHOCYTES # BLD AUTO: 10.3 % — LOW (ref 13–44)
LYMPHOCYTES # BLD AUTO: 11.4 % — LOW (ref 13–44)
LYMPHOCYTES # BLD AUTO: 12.7 % — LOW (ref 13–44)
LYMPHOCYTES # BLD AUTO: 17 % — SIGNIFICANT CHANGE UP (ref 13–44)
LYMPHOCYTES # BLD AUTO: 2.03 K/UL
LYMPHOCYTES # BLD AUTO: 2.23 K/UL — SIGNIFICANT CHANGE UP (ref 1–3.3)
LYMPHOCYTES # BLD AUTO: 2.3 K/UL — SIGNIFICANT CHANGE UP (ref 1–3.3)
LYMPHOCYTES # BLD AUTO: 2.77 K/UL — SIGNIFICANT CHANGE UP (ref 1–3.3)
LYMPHOCYTES # BLD AUTO: 7.9 % — LOW (ref 13–44)
LYMPHOCYTES # BLD AUTO: 9.6 % — LOW (ref 13–44)
LYMPHOCYTES NFR BLD AUTO: 39.3 %
MACROCYTES BLD QL: SIGNIFICANT CHANGE UP
MACROCYTES BLD QL: SLIGHT — SIGNIFICANT CHANGE UP
MACROCYTES BLD QL: SLIGHT — SIGNIFICANT CHANGE UP
MAGNESIUM SERPL-MCNC: 1.5 MG/DL — LOW (ref 1.6–2.6)
MAGNESIUM SERPL-MCNC: 1.6 MG/DL
MAGNESIUM SERPL-MCNC: 2 MG/DL — SIGNIFICANT CHANGE UP (ref 1.6–2.6)
MAGNESIUM SERPL-MCNC: 2 MG/DL — SIGNIFICANT CHANGE UP (ref 1.6–2.6)
MAGNESIUM SERPL-MCNC: 2.3 MG/DL — SIGNIFICANT CHANGE UP (ref 1.6–2.6)
MAGNESIUM SERPL-MCNC: 2.4 MG/DL — SIGNIFICANT CHANGE UP (ref 1.6–2.6)
MAGNESIUM SERPL-MCNC: 2.8 MG/DL — HIGH (ref 1.6–2.6)
MAGNESIUM SERPL-MCNC: 3 MG/DL — HIGH (ref 1.6–2.6)
MAGNESIUM SERPL-MCNC: 3 MG/DL — HIGH (ref 1.8–2.6)
MAGNESIUM SERPL-MCNC: 3.1 MG/DL — HIGH (ref 1.6–2.6)
MAGNESIUM SERPL-MCNC: 3.3 MG/DL — HIGH (ref 1.6–2.6)
MAN DIFF?: NORMAL
MANUAL SMEAR VERIFICATION: SIGNIFICANT CHANGE UP
MCHC RBC-ENTMCNC: 25.6 PG — LOW (ref 27–34)
MCHC RBC-ENTMCNC: 25.9 PG — LOW (ref 27–34)
MCHC RBC-ENTMCNC: 25.9 PG — LOW (ref 27–34)
MCHC RBC-ENTMCNC: 26.3 PG — LOW (ref 27–34)
MCHC RBC-ENTMCNC: 26.3 PG — LOW (ref 27–34)
MCHC RBC-ENTMCNC: 26.4 PG — LOW (ref 27–34)
MCHC RBC-ENTMCNC: 26.7 PG — LOW (ref 27–34)
MCHC RBC-ENTMCNC: 27.4 PG — SIGNIFICANT CHANGE UP (ref 27–34)
MCHC RBC-ENTMCNC: 28.2 PG
MCHC RBC-ENTMCNC: 28.2 PG — SIGNIFICANT CHANGE UP (ref 27–34)
MCHC RBC-ENTMCNC: 28.3 PG — SIGNIFICANT CHANGE UP (ref 27–34)
MCHC RBC-ENTMCNC: 28.3 PG — SIGNIFICANT CHANGE UP (ref 27–34)
MCHC RBC-ENTMCNC: 29.5 GM/DL — LOW (ref 32–36)
MCHC RBC-ENTMCNC: 30.1 GM/DL — LOW (ref 32–36)
MCHC RBC-ENTMCNC: 30.5 GM/DL — LOW (ref 32–36)
MCHC RBC-ENTMCNC: 30.6 GM/DL — LOW (ref 32–36)
MCHC RBC-ENTMCNC: 30.7 GM/DL — LOW (ref 32–36)
MCHC RBC-ENTMCNC: 31 GM/DL — LOW (ref 32–36)
MCHC RBC-ENTMCNC: 31.3 GM/DL — LOW (ref 32–36)
MCHC RBC-ENTMCNC: 31.4 GM/DL — LOW (ref 32–36)
MCHC RBC-ENTMCNC: 31.7 GM/DL — LOW (ref 32–36)
MCHC RBC-ENTMCNC: 31.9 GM/DL
MCHC RBC-ENTMCNC: 32 GM/DL — SIGNIFICANT CHANGE UP (ref 32–36)
MCHC RBC-ENTMCNC: 32.2 GM/DL — SIGNIFICANT CHANGE UP (ref 32–36)
MCV RBC AUTO: 83 FL — SIGNIFICANT CHANGE UP (ref 80–100)
MCV RBC AUTO: 83.5 FL — SIGNIFICANT CHANGE UP (ref 80–100)
MCV RBC AUTO: 83.8 FL — SIGNIFICANT CHANGE UP (ref 80–100)
MCV RBC AUTO: 84.6 FL — SIGNIFICANT CHANGE UP (ref 80–100)
MCV RBC AUTO: 85.2 FL — SIGNIFICANT CHANGE UP (ref 80–100)
MCV RBC AUTO: 86.8 FL — SIGNIFICANT CHANGE UP (ref 80–100)
MCV RBC AUTO: 87.9 FL — SIGNIFICANT CHANGE UP (ref 80–100)
MCV RBC AUTO: 88.2 FL — SIGNIFICANT CHANGE UP (ref 80–100)
MCV RBC AUTO: 88.3 FL
MCV RBC AUTO: 89.5 FL — SIGNIFICANT CHANGE UP (ref 80–100)
MCV RBC AUTO: 90 FL — SIGNIFICANT CHANGE UP (ref 80–100)
MCV RBC AUTO: 90.5 FL — SIGNIFICANT CHANGE UP (ref 80–100)
METAMYELOCYTES # FLD: 0.9 % — HIGH (ref 0–0)
METAMYELOCYTES # FLD: 0.9 % — HIGH (ref 0–0)
METAMYELOCYTES # FLD: 5 % — HIGH (ref 0–0)
MICROCYTES BLD QL: SLIGHT — SIGNIFICANT CHANGE UP
MICROSCOPIC-UA: NORMAL
MONOCYTES # BLD AUTO: 0.33 K/UL — SIGNIFICANT CHANGE UP (ref 0–0.9)
MONOCYTES # BLD AUTO: 0.55 K/UL
MONOCYTES # BLD AUTO: 0.65 K/UL — SIGNIFICANT CHANGE UP (ref 0–0.9)
MONOCYTES # BLD AUTO: 1.07 K/UL — HIGH (ref 0–0.9)
MONOCYTES # BLD AUTO: 1.72 K/UL — HIGH (ref 0–0.9)
MONOCYTES # BLD AUTO: 2.02 K/UL — HIGH (ref 0–0.9)
MONOCYTES NFR BLD AUTO: 10.6 %
MONOCYTES NFR BLD AUTO: 12 % — SIGNIFICANT CHANGE UP (ref 2–14)
MONOCYTES NFR BLD AUTO: 4.2 % — SIGNIFICANT CHANGE UP (ref 2–14)
MONOCYTES NFR BLD AUTO: 5.3 % — SIGNIFICANT CHANGE UP (ref 2–14)
MONOCYTES NFR BLD AUTO: 6.1 % — SIGNIFICANT CHANGE UP (ref 2–14)
MONOCYTES NFR BLD AUTO: 7 % — SIGNIFICANT CHANGE UP (ref 2–14)
MONOCYTES NFR BLD AUTO: 8.3 % — SIGNIFICANT CHANGE UP (ref 2–14)
MYELOCYTES NFR BLD: 0.9 % — HIGH (ref 0–0)
N MEN DNA SPEC QL NAA+PROBE: SIGNIFICANT CHANGE UP
NEUTROPHILS # BLD AUTO: 16.43 K/UL — HIGH (ref 1.8–7.4)
NEUTROPHILS # BLD AUTO: 2.53 K/UL
NEUTROPHILS # BLD AUTO: 23.49 K/UL — HIGH (ref 1.8–7.4)
NEUTROPHILS # BLD AUTO: 23.58 K/UL — HIGH (ref 1.8–7.4)
NEUTROPHILS # BLD AUTO: 6.21 K/UL — SIGNIFICANT CHANGE UP (ref 1.8–7.4)
NEUTROPHILS # BLD AUTO: 6.37 K/UL — SIGNIFICANT CHANGE UP (ref 1.8–7.4)
NEUTROPHILS NFR BLD AUTO: 48.9 %
NEUTROPHILS NFR BLD AUTO: 59 % — SIGNIFICANT CHANGE UP (ref 43–77)
NEUTROPHILS NFR BLD AUTO: 79.8 % — HIGH (ref 43–77)
NEUTROPHILS NFR BLD AUTO: 79.9 % — HIGH (ref 43–77)
NEUTROPHILS NFR BLD AUTO: 81 % — HIGH (ref 43–77)
NEUTROPHILS NFR BLD AUTO: 81.6 % — HIGH (ref 43–77)
NEUTROPHILS NFR BLD AUTO: 83.3 % — HIGH (ref 43–77)
NEUTS BAND # BLD: 1.7 % — SIGNIFICANT CHANGE UP (ref 0–8)
NEUTS BAND # BLD: 6 % — SIGNIFICANT CHANGE UP (ref 0–8)
NIGHT BLUE STAIN TISS: SIGNIFICANT CHANGE UP
NITRITE UR-MCNC: NEGATIVE — SIGNIFICANT CHANGE UP
NITRITE UR-MCNC: NEGATIVE — SIGNIFICANT CHANGE UP
NITRITE URINE: NEGATIVE
NON HDL CHOLESTEROL: 32 MG/DL — SIGNIFICANT CHANGE UP
NRBC # BLD: 15 /100 — HIGH (ref 0–0)
NRBC # BLD: 27 /100 — HIGH (ref 0–0)
NRBC # BLD: 31 /100 WBCS — HIGH (ref 0–0)
NRBC # BLD: 33 /100 WBCS — HIGH (ref 0–0)
NRBC # BLD: 39 /100 WBCS — HIGH (ref 0–0)
NRBC # BLD: 5 /100 — HIGH (ref 0–0)
NRBC # BLD: 8 /100 WBCS — HIGH (ref 0–0)
NT-PROBNP SERPL-SCNC: 2528 PG/ML — HIGH (ref 0–300)
NT-PROBNP SERPL-SCNC: 3371 PG/ML — HIGH (ref 0–300)
NT-PROBNP SERPL-SCNC: 7497 PG/ML — HIGH (ref 0–300)
NT-PROBNP SERPL-SCNC: HIGH PG/ML (ref 0–300)
OB PNL STL: POSITIVE
OVALOCYTES BLD QL SMEAR: SIGNIFICANT CHANGE UP
OVALOCYTES BLD QL SMEAR: SLIGHT — SIGNIFICANT CHANGE UP
PARATHYROID HORMONE INTACT: 58 PG/ML
PARECHOVIRUS A RNA SPEC QL NAA+PROBE: SIGNIFICANT CHANGE UP
PCO2 BLDA: 37 MMHG — SIGNIFICANT CHANGE UP (ref 35–45)
PCO2 BLDA: 39 MMHG — SIGNIFICANT CHANGE UP (ref 35–45)
PCO2 BLDA: 40 MMHG — SIGNIFICANT CHANGE UP (ref 35–45)
PH BLDA: 7.27 — LOW (ref 7.35–7.45)
PH BLDA: 7.44 — SIGNIFICANT CHANGE UP (ref 7.35–7.45)
PH BLDA: 7.51 — HIGH (ref 7.35–7.45)
PH UR: 5 — SIGNIFICANT CHANGE UP (ref 5–8)
PH UR: 5 — SIGNIFICANT CHANGE UP (ref 5–8)
PH URINE: 5.5
PHOSPHATE SERPL-MCNC: 2.6 MG/DL
PHOSPHATE SERPL-MCNC: 2.7 MG/DL — SIGNIFICANT CHANGE UP (ref 2.4–4.7)
PHOSPHATE SERPL-MCNC: 3.3 MG/DL — SIGNIFICANT CHANGE UP (ref 2.4–4.7)
PHOSPHATE SERPL-MCNC: 3.4 MG/DL — SIGNIFICANT CHANGE UP (ref 2.4–4.7)
PHOSPHATE SERPL-MCNC: 3.5 MG/DL — SIGNIFICANT CHANGE UP (ref 2.4–4.7)
PHOSPHATE SERPL-MCNC: 3.9 MG/DL — SIGNIFICANT CHANGE UP (ref 2.4–4.7)
PHOSPHATE SERPL-MCNC: 4.2 MG/DL — SIGNIFICANT CHANGE UP (ref 2.4–4.7)
PHOSPHATE SERPL-MCNC: 4.5 MG/DL — SIGNIFICANT CHANGE UP (ref 2.4–4.7)
PHOSPHATE SERPL-MCNC: 5 MG/DL — HIGH (ref 2.4–4.7)
PHOSPHATE SERPL-MCNC: 6 MG/DL — HIGH (ref 2.4–4.7)
PLAT MORPH BLD: ABNORMAL
PLAT MORPH BLD: NORMAL — SIGNIFICANT CHANGE UP
PLATELET # BLD AUTO: 123 K/UL — LOW (ref 150–400)
PLATELET # BLD AUTO: 126 K/UL — LOW (ref 150–400)
PLATELET # BLD AUTO: 128 K/UL — LOW (ref 150–400)
PLATELET # BLD AUTO: 131 K/UL — LOW (ref 150–400)
PLATELET # BLD AUTO: 137 K/UL — LOW (ref 150–400)
PLATELET # BLD AUTO: 162 K/UL — SIGNIFICANT CHANGE UP (ref 150–400)
PLATELET # BLD AUTO: 220 K/UL — SIGNIFICANT CHANGE UP (ref 150–400)
PLATELET # BLD AUTO: 225 K/UL — SIGNIFICANT CHANGE UP (ref 150–400)
PLATELET # BLD AUTO: 261 K/UL
PLATELET # BLD AUTO: 87 K/UL — LOW (ref 150–400)
PLATELET # BLD AUTO: 92 K/UL — LOW (ref 150–400)
PLATELET # BLD AUTO: 97 K/UL — LOW (ref 150–400)
PO2 BLDA: 195 MMHG — HIGH (ref 83–108)
PO2 BLDA: 76 MMHG — LOW (ref 83–108)
PO2 BLDA: 86 MMHG — SIGNIFICANT CHANGE UP (ref 83–108)
POIKILOCYTOSIS BLD QL AUTO: SIGNIFICANT CHANGE UP
POIKILOCYTOSIS BLD QL AUTO: SIGNIFICANT CHANGE UP
POIKILOCYTOSIS BLD QL AUTO: SLIGHT — SIGNIFICANT CHANGE UP
POIKILOCYTOSIS BLD QL AUTO: SLIGHT — SIGNIFICANT CHANGE UP
POLYCHROMASIA BLD QL SMEAR: SIGNIFICANT CHANGE UP
POLYCHROMASIA BLD QL SMEAR: SLIGHT — SIGNIFICANT CHANGE UP
POTASSIUM SERPL-MCNC: 2.6 MMOL/L — CRITICAL LOW (ref 3.5–5.3)
POTASSIUM SERPL-MCNC: 2.8 MMOL/L — CRITICAL LOW (ref 3.5–5.3)
POTASSIUM SERPL-MCNC: 3.2 MMOL/L — LOW (ref 3.5–5.3)
POTASSIUM SERPL-MCNC: 3.4 MMOL/L — LOW (ref 3.5–5.3)
POTASSIUM SERPL-MCNC: 3.7 MMOL/L — SIGNIFICANT CHANGE UP (ref 3.5–5.3)
POTASSIUM SERPL-MCNC: 4 MMOL/L — SIGNIFICANT CHANGE UP (ref 3.5–5.3)
POTASSIUM SERPL-MCNC: 4.2 MMOL/L — SIGNIFICANT CHANGE UP (ref 3.5–5.3)
POTASSIUM SERPL-MCNC: 4.2 MMOL/L — SIGNIFICANT CHANGE UP (ref 3.5–5.3)
POTASSIUM SERPL-MCNC: 4.5 MMOL/L — SIGNIFICANT CHANGE UP (ref 3.5–5.3)
POTASSIUM SERPL-SCNC: 2.6 MMOL/L — CRITICAL LOW (ref 3.5–5.3)
POTASSIUM SERPL-SCNC: 2.8 MMOL/L — CRITICAL LOW (ref 3.5–5.3)
POTASSIUM SERPL-SCNC: 3.2 MMOL/L — LOW (ref 3.5–5.3)
POTASSIUM SERPL-SCNC: 3.4 MMOL/L — LOW (ref 3.5–5.3)
POTASSIUM SERPL-SCNC: 3.7 MMOL/L — SIGNIFICANT CHANGE UP (ref 3.5–5.3)
POTASSIUM SERPL-SCNC: 4 MMOL/L — SIGNIFICANT CHANGE UP (ref 3.5–5.3)
POTASSIUM SERPL-SCNC: 4.2 MMOL/L — SIGNIFICANT CHANGE UP (ref 3.5–5.3)
POTASSIUM SERPL-SCNC: 4.2 MMOL/L — SIGNIFICANT CHANGE UP (ref 3.5–5.3)
POTASSIUM SERPL-SCNC: 4.4 MMOL/L
POTASSIUM SERPL-SCNC: 4.5 MMOL/L — SIGNIFICANT CHANGE UP (ref 3.5–5.3)
PROCALCITONIN SERPL-MCNC: 2.4 NG/ML — HIGH (ref 0.02–0.1)
PROCALCITONIN SERPL-MCNC: 5.67 NG/ML — HIGH (ref 0.02–0.1)
PROCALCITONIN SERPL-MCNC: 51.48 NG/ML — HIGH (ref 0.02–0.1)
PROCALCITONIN SERPL-MCNC: 8.23 NG/ML — HIGH (ref 0.02–0.1)
PROMYELOCYTES # FLD: 0.9 % — HIGH (ref 0–0)
PROT CSF-MCNC: 34 MG/DL — SIGNIFICANT CHANGE UP (ref 15–45)
PROT SERPL-MCNC: 4.4 G/DL — LOW (ref 6.6–8.7)
PROT SERPL-MCNC: 4.7 G/DL — LOW (ref 6.6–8.7)
PROT SERPL-MCNC: 5.1 G/DL — LOW (ref 6.6–8.7)
PROT SERPL-MCNC: 5.6 G/DL — LOW (ref 6.6–8.7)
PROT SERPL-MCNC: 6 G/DL — LOW (ref 6.6–8.7)
PROT SERPL-MCNC: 8.3 G/DL — SIGNIFICANT CHANGE UP (ref 6.6–8.7)
PROT UR-MCNC: 100 MG/DL
PROT UR-MCNC: 30 MG/DL
PROT UR-MCNC: 8 MG/DL
PROTEIN URINE: NEGATIVE
PROTHROM AB SERPL-ACNC: 16.3 SEC — HIGH (ref 10.6–13.6)
PROTHROM AB SERPL-ACNC: 16.6 SEC — HIGH (ref 10.6–13.6)
PROTHROM AB SERPL-ACNC: 17.2 SEC — HIGH (ref 10.6–13.6)
PROTHROM AB SERPL-ACNC: 17.8 SEC — HIGH (ref 10.6–13.6)
PROTHROM AB SERPL-ACNC: 22.1 SEC — HIGH (ref 10.6–13.6)
PROTHROM AB SERPL-ACNC: 30.5 SEC — HIGH (ref 10.6–13.6)
RBC # BLD: 2.43 M/UL — LOW (ref 3.8–5.2)
RBC # BLD: 2.59 M/UL — LOW (ref 3.8–5.2)
RBC # BLD: 2.66 M/UL — LOW (ref 3.8–5.2)
RBC # BLD: 2.95 M/UL — LOW (ref 3.8–5.2)
RBC # BLD: 3.07 M/UL — LOW (ref 3.8–5.2)
RBC # BLD: 3.22 M/UL — LOW (ref 3.8–5.2)
RBC # BLD: 3.44 M/UL — LOW (ref 3.8–5.2)
RBC # BLD: 3.51 M/UL — LOW (ref 3.8–5.2)
RBC # BLD: 4.12 M/UL
RBC # BLD: 4.18 M/UL — SIGNIFICANT CHANGE UP (ref 3.8–5.2)
RBC # BLD: 4.25 M/UL — SIGNIFICANT CHANGE UP (ref 3.8–5.2)
RBC # BLD: 4.8 M/UL — SIGNIFICANT CHANGE UP (ref 3.8–5.2)
RBC # FLD: 12.8 % — SIGNIFICANT CHANGE UP (ref 10.3–14.5)
RBC # FLD: 12.8 % — SIGNIFICANT CHANGE UP (ref 10.3–14.5)
RBC # FLD: 13.1 % — SIGNIFICANT CHANGE UP (ref 10.3–14.5)
RBC # FLD: 13.2 %
RBC # FLD: 13.2 % — SIGNIFICANT CHANGE UP (ref 10.3–14.5)
RBC # FLD: 13.6 % — SIGNIFICANT CHANGE UP (ref 10.3–14.5)
RBC # FLD: 15.4 % — HIGH (ref 10.3–14.5)
RBC # FLD: 15.7 % — HIGH (ref 10.3–14.5)
RBC # FLD: 15.8 % — HIGH (ref 10.3–14.5)
RBC # FLD: 15.9 % — HIGH (ref 10.3–14.5)
RBC # FLD: 17.6 % — HIGH (ref 10.3–14.5)
RBC # FLD: 17.8 % — HIGH (ref 10.3–14.5)
RBC BLD AUTO: ABNORMAL
RBC CASTS # UR COMP ASSIST: ABNORMAL /HPF (ref 0–4)
RBC CASTS # UR COMP ASSIST: ABNORMAL /HPF (ref 0–4)
RED BLOOD CELLS URINE: 1 /HPF
S PNEUM DNA SPEC QL NAA+PROBE: SIGNIFICANT CHANGE UP
SAO2 % BLDA: 92 % — LOW (ref 95–99)
SAO2 % BLDA: 97 % — SIGNIFICANT CHANGE UP (ref 95–99)
SAO2 % BLDA: 99 % — SIGNIFICANT CHANGE UP (ref 95–99)
SARS-COV-2 IGG SERPL QL IA: NEGATIVE — SIGNIFICANT CHANGE UP
SARS-COV-2 IGM SERPL IA-ACNC: <0.1 INDEX — SIGNIFICANT CHANGE UP
SARS-COV-2 RNA SPEC QL NAA+PROBE: SIGNIFICANT CHANGE UP
SCHISTOCYTES BLD QL AUTO: SLIGHT — SIGNIFICANT CHANGE UP
SCHISTOCYTES BLD QL AUTO: SLIGHT — SIGNIFICANT CHANGE UP
SODIUM SERPL-SCNC: 138 MMOL/L
SODIUM SERPL-SCNC: 142 MMOL/L — SIGNIFICANT CHANGE UP (ref 135–145)
SODIUM SERPL-SCNC: 143 MMOL/L — SIGNIFICANT CHANGE UP (ref 135–145)
SODIUM SERPL-SCNC: 144 MMOL/L — SIGNIFICANT CHANGE UP (ref 135–145)
SODIUM SERPL-SCNC: 145 MMOL/L — SIGNIFICANT CHANGE UP (ref 135–145)
SODIUM SERPL-SCNC: 146 MMOL/L — HIGH (ref 135–145)
SODIUM SERPL-SCNC: 147 MMOL/L — HIGH (ref 135–145)
SODIUM SERPL-SCNC: 148 MMOL/L — HIGH (ref 135–145)
SODIUM SERPL-SCNC: 151 MMOL/L — HIGH (ref 135–145)
SP GR SPEC: 1.01 — SIGNIFICANT CHANGE UP (ref 1.01–1.02)
SP GR SPEC: 1.02 — SIGNIFICANT CHANGE UP (ref 1.01–1.02)
SPECIFIC GRAVITY URINE: 1.01
SPECIMEN SOURCE: SIGNIFICANT CHANGE UP
SQUAMOUS EPITHELIAL CELLS: 1 /HPF
T3 SERPL-MCNC: 147 NG/DL — SIGNIFICANT CHANGE UP (ref 80–200)
T3FREE SERPL-MCNC: 2.82 PG/ML — SIGNIFICANT CHANGE UP (ref 1.8–4.6)
T4 AB SER-ACNC: 12 UG/DL — SIGNIFICANT CHANGE UP (ref 4.5–12)
T4 FREE SERPL-MCNC: 2.7 NG/DL — HIGH (ref 0.9–1.8)
T4 FREE SERPL-MCNC: 4.5 NG/DL — HIGH (ref 0.9–1.8)
TARGETS BLD QL SMEAR: SLIGHT — SIGNIFICANT CHANGE UP
TRIGL SERPL-MCNC: 134 MG/DL — SIGNIFICANT CHANGE UP
TROPONIN T SERPL-MCNC: 0.05 NG/ML — SIGNIFICANT CHANGE UP (ref 0–0.06)
TROPONIN T SERPL-MCNC: 0.07 NG/ML — HIGH (ref 0–0.06)
TROPONIN T SERPL-MCNC: 0.16 NG/ML — HIGH (ref 0–0.06)
TROPONIN T SERPL-MCNC: 0.17 NG/ML — HIGH (ref 0–0.06)
TSH SERPL-MCNC: <0.1 UIU/ML — LOW (ref 0.27–4.2)
URINE COMMENTS: NORMAL
UROBILINOGEN FLD QL: NEGATIVE MG/DL — SIGNIFICANT CHANGE UP
UROBILINOGEN FLD QL: NEGATIVE MG/DL — SIGNIFICANT CHANGE UP
UROBILINOGEN URINE: NORMAL
VANCOMYCIN TROUGH SERPL-MCNC: 11.1 UG/ML — SIGNIFICANT CHANGE UP (ref 10–20)
VANCOMYCIN TROUGH SERPL-MCNC: 21.1 UG/ML — HIGH (ref 10–20)
VARIANT LYMPHS # BLD: 0.9 % — SIGNIFICANT CHANGE UP (ref 0–6)
VARIANT LYMPHS # BLD: 0.9 % — SIGNIFICANT CHANGE UP (ref 0–6)
VARIANT LYMPHS # BLD: 1 % — SIGNIFICANT CHANGE UP (ref 0–6)
VIT B12 SERPL-MCNC: 1769 PG/ML — HIGH (ref 232–1245)
VZV DNA CSF QL NAA+PROBE: SIGNIFICANT CHANGE UP
WBC # BLD: 11.72 K/UL — HIGH (ref 3.8–10.5)
WBC # BLD: 16.08 K/UL — HIGH (ref 3.8–10.5)
WBC # BLD: 20.16 K/UL — HIGH (ref 3.8–10.5)
WBC # BLD: 28.2 K/UL — HIGH (ref 3.8–10.5)
WBC # BLD: 28.9 K/UL — HIGH (ref 3.8–10.5)
WBC # BLD: 29.3 K/UL — HIGH (ref 3.8–10.5)
WBC # BLD: 34.3 K/UL — HIGH (ref 3.8–10.5)
WBC # BLD: 41.48 K/UL — CRITICAL HIGH (ref 3.8–10.5)
WBC # BLD: 7.78 K/UL — SIGNIFICANT CHANGE UP (ref 3.8–10.5)
WBC # BLD: 7.86 K/UL — SIGNIFICANT CHANGE UP (ref 3.8–10.5)
WBC # BLD: 9.17 K/UL — SIGNIFICANT CHANGE UP (ref 3.8–10.5)
WBC # FLD AUTO: 11.72 K/UL — HIGH (ref 3.8–10.5)
WBC # FLD AUTO: 16.08 K/UL — HIGH (ref 3.8–10.5)
WBC # FLD AUTO: 20.16 K/UL — HIGH (ref 3.8–10.5)
WBC # FLD AUTO: 28.2 K/UL — HIGH (ref 3.8–10.5)
WBC # FLD AUTO: 28.9 K/UL — HIGH (ref 3.8–10.5)
WBC # FLD AUTO: 29.3 K/UL — HIGH (ref 3.8–10.5)
WBC # FLD AUTO: 34.3 K/UL — HIGH (ref 3.8–10.5)
WBC # FLD AUTO: 41.48 K/UL — CRITICAL HIGH (ref 3.8–10.5)
WBC # FLD AUTO: 5.17 K/UL
WBC # FLD AUTO: 7.78 K/UL — SIGNIFICANT CHANGE UP (ref 3.8–10.5)
WBC # FLD AUTO: 7.86 K/UL — SIGNIFICANT CHANGE UP (ref 3.8–10.5)
WBC # FLD AUTO: 9.17 K/UL — SIGNIFICANT CHANGE UP (ref 3.8–10.5)
WBC UR QL: SIGNIFICANT CHANGE UP
WBC UR QL: SIGNIFICANT CHANGE UP
WHITE BLOOD CELLS URINE: 2 /HPF

## 2020-01-01 PROCEDURE — 70450 CT HEAD/BRAIN W/O DYE: CPT | Mod: 26

## 2020-01-01 PROCEDURE — 74176 CT ABD & PELVIS W/O CONTRAST: CPT | Mod: 26

## 2020-01-01 PROCEDURE — 95720 EEG PHY/QHP EA INCR W/VEEG: CPT

## 2020-01-01 PROCEDURE — 99232 SBSQ HOSP IP/OBS MODERATE 35: CPT

## 2020-01-01 PROCEDURE — 99233 SBSQ HOSP IP/OBS HIGH 50: CPT

## 2020-01-01 PROCEDURE — 71045 X-RAY EXAM CHEST 1 VIEW: CPT | Mod: 26,76

## 2020-01-01 PROCEDURE — 99222 1ST HOSP IP/OBS MODERATE 55: CPT

## 2020-01-01 PROCEDURE — 36556 INSERT NON-TUNNEL CV CATH: CPT

## 2020-01-01 PROCEDURE — 71045 X-RAY EXAM CHEST 1 VIEW: CPT | Mod: 26

## 2020-01-01 PROCEDURE — 93010 ELECTROCARDIOGRAM REPORT: CPT

## 2020-01-01 PROCEDURE — 99291 CRITICAL CARE FIRST HOUR: CPT

## 2020-01-01 PROCEDURE — 95718 EEG PHYS/QHP 2-12 HR W/VEEG: CPT

## 2020-01-01 PROCEDURE — 99497 ADVNCD CARE PLAN 30 MIN: CPT

## 2020-01-01 PROCEDURE — 99223 1ST HOSP IP/OBS HIGH 75: CPT

## 2020-01-01 PROCEDURE — 71250 CT THORAX DX C-: CPT | Mod: 26

## 2020-01-01 PROCEDURE — 31500 INSERT EMERGENCY AIRWAY: CPT

## 2020-01-01 PROCEDURE — 72125 CT NECK SPINE W/O DYE: CPT | Mod: 26

## 2020-01-01 PROCEDURE — 36415 COLL VENOUS BLD VENIPUNCTURE: CPT

## 2020-01-01 PROCEDURE — 99213 OFFICE O/P EST LOW 20 MIN: CPT | Mod: 25

## 2020-01-01 PROCEDURE — 93970 EXTREMITY STUDY: CPT | Mod: 26

## 2020-01-01 RX ORDER — METOPROLOL TARTRATE 50 MG
25 TABLET ORAL EVERY 6 HOURS
Refills: 0 | Status: DISCONTINUED | OUTPATIENT
Start: 2020-01-01 | End: 2020-01-01

## 2020-01-01 RX ORDER — ACETAMINOPHEN 500 MG
650 TABLET ORAL EVERY 6 HOURS
Refills: 0 | Status: DISCONTINUED | OUTPATIENT
Start: 2020-01-01 | End: 2020-01-01

## 2020-01-01 RX ORDER — PSYLLIUM SEED (WITH DEXTROSE)
1 POWDER (GRAM) ORAL DAILY
Refills: 0 | Status: DISCONTINUED | OUTPATIENT
Start: 2020-01-01 | End: 2020-01-01

## 2020-01-01 RX ORDER — INSULIN GLARGINE 100 [IU]/ML
10 INJECTION, SOLUTION SUBCUTANEOUS AT BEDTIME
Refills: 0 | Status: DISCONTINUED | OUTPATIENT
Start: 2020-01-01 | End: 2020-01-01

## 2020-01-01 RX ORDER — INSULIN LISPRO 100/ML
VIAL (ML) SUBCUTANEOUS EVERY 6 HOURS
Refills: 0 | Status: DISCONTINUED | OUTPATIENT
Start: 2020-01-01 | End: 2020-01-01

## 2020-01-01 RX ORDER — METHIMAZOLE 10 MG/1
10 TABLET ORAL
Refills: 0 | Status: ACTIVE | COMMUNITY
Start: 2020-01-01

## 2020-01-01 RX ORDER — LISINOPRIL 2.5 MG/1
1 TABLET ORAL
Qty: 0 | Refills: 0 | DISCHARGE

## 2020-01-01 RX ORDER — POTASSIUM CHLORIDE 20 MEQ
10 PACKET (EA) ORAL
Refills: 0 | Status: COMPLETED | OUTPATIENT
Start: 2020-01-01 | End: 2020-01-01

## 2020-01-01 RX ORDER — SODIUM CHLORIDE 9 MG/ML
1000 INJECTION, SOLUTION INTRAVENOUS
Refills: 0 | Status: DISCONTINUED | OUTPATIENT
Start: 2020-01-01 | End: 2020-01-01

## 2020-01-01 RX ORDER — DEXTROSE 50 % IN WATER 50 %
25 SYRINGE (ML) INTRAVENOUS ONCE
Refills: 0 | Status: DISCONTINUED | OUTPATIENT
Start: 2020-01-01 | End: 2020-01-01

## 2020-01-01 RX ORDER — FUROSEMIDE 40 MG
40 TABLET ORAL ONCE
Refills: 0 | Status: COMPLETED | OUTPATIENT
Start: 2020-01-01 | End: 2020-01-01

## 2020-01-01 RX ORDER — LISINOPRIL 2.5 MG/1
10 TABLET ORAL DAILY
Refills: 0 | Status: DISCONTINUED | OUTPATIENT
Start: 2020-01-01 | End: 2020-01-01

## 2020-01-01 RX ORDER — AZITHROMYCIN 500 MG/1
TABLET, FILM COATED ORAL
Refills: 0 | Status: DISCONTINUED | OUTPATIENT
Start: 2020-01-01 | End: 2020-01-01

## 2020-01-01 RX ORDER — METHIMAZOLE 10 MG/1
1 TABLET ORAL
Qty: 0 | Refills: 0 | DISCHARGE

## 2020-01-01 RX ORDER — METOPROLOL TARTRATE 50 MG
5 TABLET ORAL ONCE
Refills: 0 | Status: COMPLETED | OUTPATIENT
Start: 2020-01-01 | End: 2020-01-01

## 2020-01-01 RX ORDER — HEPARIN SODIUM 5000 [USP'U]/ML
5000 INJECTION INTRAVENOUS; SUBCUTANEOUS ONCE
Refills: 0 | Status: COMPLETED | OUTPATIENT
Start: 2020-01-01 | End: 2020-01-01

## 2020-01-01 RX ORDER — MAGNESIUM SULFATE 500 MG/ML
2 VIAL (ML) INJECTION ONCE
Refills: 0 | Status: COMPLETED | OUTPATIENT
Start: 2020-01-01 | End: 2020-01-01

## 2020-01-01 RX ORDER — HEPARIN SODIUM 5000 [USP'U]/ML
5500 INJECTION INTRAVENOUS; SUBCUTANEOUS ONCE
Refills: 0 | Status: DISCONTINUED | OUTPATIENT
Start: 2020-01-01 | End: 2020-01-01

## 2020-01-01 RX ORDER — METFORMIN HYDROCHLORIDE 850 MG/1
2 TABLET ORAL
Qty: 0 | Refills: 0 | DISCHARGE

## 2020-01-01 RX ORDER — HEPARIN SODIUM 5000 [USP'U]/ML
5500 INJECTION INTRAVENOUS; SUBCUTANEOUS EVERY 6 HOURS
Refills: 0 | Status: DISCONTINUED | OUTPATIENT
Start: 2020-01-01 | End: 2020-01-01

## 2020-01-01 RX ORDER — LEVETIRACETAM 250 MG/1
500 TABLET, FILM COATED ORAL EVERY 12 HOURS
Refills: 0 | Status: DISCONTINUED | OUTPATIENT
Start: 2020-01-01 | End: 2020-01-01

## 2020-01-01 RX ORDER — INSULIN GLARGINE 100 [IU]/ML
15 INJECTION, SOLUTION SUBCUTANEOUS AT BEDTIME
Refills: 0 | Status: DISCONTINUED | OUTPATIENT
Start: 2020-01-01 | End: 2020-01-01

## 2020-01-01 RX ORDER — HYDRALAZINE HCL 50 MG
5 TABLET ORAL ONCE
Refills: 0 | Status: COMPLETED | OUTPATIENT
Start: 2020-01-01 | End: 2020-01-01

## 2020-01-01 RX ORDER — INSULIN HUMAN 100 [IU]/ML
3 INJECTION, SOLUTION SUBCUTANEOUS
Qty: 100 | Refills: 0 | Status: DISCONTINUED | OUTPATIENT
Start: 2020-01-01 | End: 2020-01-01

## 2020-01-01 RX ORDER — FENTANYL CITRATE 50 UG/ML
25 INJECTION INTRAVENOUS
Refills: 0 | Status: DISCONTINUED | OUTPATIENT
Start: 2020-01-01 | End: 2020-01-01

## 2020-01-01 RX ORDER — DEXTROSE 50 % IN WATER 50 %
12.5 SYRINGE (ML) INTRAVENOUS ONCE
Refills: 0 | Status: DISCONTINUED | OUTPATIENT
Start: 2020-01-01 | End: 2020-01-01

## 2020-01-01 RX ORDER — VANCOMYCIN HCL 1 G
VIAL (EA) INTRAVENOUS
Refills: 0 | Status: DISCONTINUED | OUTPATIENT
Start: 2020-01-01 | End: 2020-01-01

## 2020-01-01 RX ORDER — HYDROCORTISONE 20 MG
50 TABLET ORAL EVERY 12 HOURS
Refills: 0 | Status: DISCONTINUED | OUTPATIENT
Start: 2020-01-01 | End: 2020-01-01

## 2020-01-01 RX ORDER — NOREPINEPHRINE BITARTRATE/D5W 8 MG/250ML
0.3 PLASTIC BAG, INJECTION (ML) INTRAVENOUS
Qty: 8 | Refills: 0 | Status: DISCONTINUED | OUTPATIENT
Start: 2020-01-01 | End: 2020-01-01

## 2020-01-01 RX ORDER — METOPROLOL TARTRATE 50 MG
10 TABLET ORAL ONCE
Refills: 0 | Status: DISCONTINUED | OUTPATIENT
Start: 2020-01-01 | End: 2020-01-01

## 2020-01-01 RX ORDER — METOPROLOL TARTRATE 50 MG
50 TABLET ORAL EVERY 6 HOURS
Refills: 0 | Status: DISCONTINUED | OUTPATIENT
Start: 2020-01-01 | End: 2020-01-01

## 2020-01-01 RX ORDER — MEROPENEM 1 G/30ML
1000 INJECTION INTRAVENOUS EVERY 12 HOURS
Refills: 0 | Status: DISCONTINUED | OUTPATIENT
Start: 2020-01-01 | End: 2020-01-01

## 2020-01-01 RX ORDER — SODIUM CHLORIDE 9 MG/ML
10 INJECTION INTRAMUSCULAR; INTRAVENOUS; SUBCUTANEOUS
Refills: 0 | Status: DISCONTINUED | OUTPATIENT
Start: 2020-01-01 | End: 2020-01-01

## 2020-01-01 RX ORDER — AZITHROMYCIN 500 MG/1
500 TABLET, FILM COATED ORAL EVERY 24 HOURS
Refills: 0 | Status: DISCONTINUED | OUTPATIENT
Start: 2020-01-01 | End: 2020-01-01

## 2020-01-01 RX ORDER — PANTOPRAZOLE SODIUM 20 MG/1
40 TABLET, DELAYED RELEASE ORAL
Refills: 0 | Status: DISCONTINUED | OUTPATIENT
Start: 2020-01-01 | End: 2020-01-01

## 2020-01-01 RX ORDER — DEXTROSE 50 % IN WATER 50 %
15 SYRINGE (ML) INTRAVENOUS ONCE
Refills: 0 | Status: DISCONTINUED | OUTPATIENT
Start: 2020-01-01 | End: 2020-01-01

## 2020-01-01 RX ORDER — VANCOMYCIN HCL 1 G
750 VIAL (EA) INTRAVENOUS EVERY 12 HOURS
Refills: 0 | Status: DISCONTINUED | OUTPATIENT
Start: 2020-01-01 | End: 2020-01-01

## 2020-01-01 RX ORDER — VANCOMYCIN HCL 1 G
500 VIAL (EA) INTRAVENOUS EVERY 12 HOURS
Refills: 0 | Status: DISCONTINUED | OUTPATIENT
Start: 2020-01-01 | End: 2020-01-01

## 2020-01-01 RX ORDER — AMLODIPINE BESYLATE 5 MG/1
5 TABLET ORAL
Qty: 90 | Refills: 1 | Status: ACTIVE | COMMUNITY
Start: 2020-01-01

## 2020-01-01 RX ORDER — AMIODARONE HYDROCHLORIDE 400 MG/1
150 TABLET ORAL ONCE
Refills: 0 | Status: COMPLETED | OUTPATIENT
Start: 2020-01-01 | End: 2020-01-01

## 2020-01-01 RX ORDER — DILTIAZEM HCL 120 MG
15 CAPSULE, EXT RELEASE 24 HR ORAL ONCE
Refills: 0 | Status: COMPLETED | OUTPATIENT
Start: 2020-01-01 | End: 2020-01-01

## 2020-01-01 RX ORDER — INSULIN GLARGINE 100 [IU]/ML
15 INJECTION, SOLUTION SUBCUTANEOUS
Refills: 0 | Status: DISCONTINUED | OUTPATIENT
Start: 2020-01-01 | End: 2020-01-01

## 2020-01-01 RX ORDER — INSULIN HUMAN 100 [IU]/ML
4 INJECTION, SOLUTION SUBCUTANEOUS
Qty: 100 | Refills: 0 | Status: DISCONTINUED | OUTPATIENT
Start: 2020-01-01 | End: 2020-01-01

## 2020-01-01 RX ORDER — HEPARIN SODIUM 5000 [USP'U]/ML
2500 INJECTION INTRAVENOUS; SUBCUTANEOUS EVERY 6 HOURS
Refills: 0 | Status: DISCONTINUED | OUTPATIENT
Start: 2020-01-01 | End: 2020-01-01

## 2020-01-01 RX ORDER — SODIUM CHLORIDE 9 MG/ML
1000 INJECTION, SOLUTION INTRAVENOUS ONCE
Refills: 0 | Status: COMPLETED | OUTPATIENT
Start: 2020-01-01 | End: 2020-01-01

## 2020-01-01 RX ORDER — CHLORHEXIDINE GLUCONATE 213 G/1000ML
15 SOLUTION TOPICAL EVERY 12 HOURS
Refills: 0 | Status: DISCONTINUED | OUTPATIENT
Start: 2020-01-01 | End: 2020-01-01

## 2020-01-01 RX ORDER — DIGOXIN 250 MCG
0.25 TABLET ORAL EVERY 4 HOURS
Refills: 0 | Status: COMPLETED | OUTPATIENT
Start: 2020-01-01 | End: 2020-01-01

## 2020-01-01 RX ORDER — MEROPENEM 1 G/30ML
2000 INJECTION INTRAVENOUS EVERY 12 HOURS
Refills: 0 | Status: DISCONTINUED | OUTPATIENT
Start: 2020-01-01 | End: 2020-01-01

## 2020-01-01 RX ORDER — FENTANYL CITRATE 50 UG/ML
50 INJECTION INTRAVENOUS ONCE
Refills: 0 | Status: DISCONTINUED | OUTPATIENT
Start: 2020-01-01 | End: 2020-01-01

## 2020-01-01 RX ORDER — PROPYLTHIOURACIL 50 MG
200 TABLET ORAL EVERY 8 HOURS
Refills: 0 | Status: DISCONTINUED | OUTPATIENT
Start: 2020-01-01 | End: 2020-01-01

## 2020-01-01 RX ORDER — ACETYLCYSTEINE 200 MG/ML
4 VIAL (ML) MISCELLANEOUS EVERY 6 HOURS
Refills: 0 | Status: DISCONTINUED | OUTPATIENT
Start: 2020-01-01 | End: 2020-01-01

## 2020-01-01 RX ORDER — LOSARTAN POTASSIUM 100 MG/1
1 TABLET, FILM COATED ORAL
Qty: 0 | Refills: 0 | DISCHARGE

## 2020-01-01 RX ORDER — SODIUM BICARBONATE 1 MEQ/ML
1 SYRINGE (ML) INTRAVENOUS
Qty: 0 | Refills: 0 | DISCHARGE

## 2020-01-01 RX ORDER — PANTOPRAZOLE SODIUM 20 MG/1
40 TABLET, DELAYED RELEASE ORAL DAILY
Refills: 0 | Status: DISCONTINUED | OUTPATIENT
Start: 2020-01-01 | End: 2020-01-01

## 2020-01-01 RX ORDER — HYDROCORTISONE 20 MG
100 TABLET ORAL EVERY 8 HOURS
Refills: 0 | Status: DISCONTINUED | OUTPATIENT
Start: 2020-01-01 | End: 2020-01-01

## 2020-01-01 RX ORDER — POTASSIUM CHLORIDE 20 MEQ
40 PACKET (EA) ORAL EVERY 4 HOURS
Refills: 0 | Status: COMPLETED | OUTPATIENT
Start: 2020-01-01 | End: 2020-01-01

## 2020-01-01 RX ORDER — SODIUM CHLORIDE 9 MG/ML
2000 INJECTION, SOLUTION INTRAVENOUS ONCE
Refills: 0 | Status: COMPLETED | OUTPATIENT
Start: 2020-01-01 | End: 2020-01-01

## 2020-01-01 RX ORDER — HEPARIN SODIUM 5000 [USP'U]/ML
INJECTION INTRAVENOUS; SUBCUTANEOUS
Qty: 25000 | Refills: 0 | Status: DISCONTINUED | OUTPATIENT
Start: 2020-01-01 | End: 2020-01-01

## 2020-01-01 RX ORDER — PIPERACILLIN AND TAZOBACTAM 4; .5 G/20ML; G/20ML
3.38 INJECTION, POWDER, LYOPHILIZED, FOR SOLUTION INTRAVENOUS ONCE
Refills: 0 | Status: COMPLETED | OUTPATIENT
Start: 2020-01-01 | End: 2020-01-01

## 2020-01-01 RX ORDER — PIPERACILLIN AND TAZOBACTAM 4; .5 G/20ML; G/20ML
3.38 INJECTION, POWDER, LYOPHILIZED, FOR SOLUTION INTRAVENOUS EVERY 8 HOURS
Refills: 0 | Status: DISCONTINUED | OUTPATIENT
Start: 2020-01-01 | End: 2020-01-01

## 2020-01-01 RX ORDER — ACYCLOVIR SODIUM 500 MG
950 VIAL (EA) INTRAVENOUS EVERY 12 HOURS
Refills: 0 | Status: DISCONTINUED | OUTPATIENT
Start: 2020-01-01 | End: 2020-01-01

## 2020-01-01 RX ORDER — FENTANYL CITRATE 50 UG/ML
50 INJECTION INTRAVENOUS
Refills: 0 | Status: DISCONTINUED | OUTPATIENT
Start: 2020-01-01 | End: 2020-01-01

## 2020-01-01 RX ORDER — SODIUM CHLORIDE 9 MG/ML
250 INJECTION, SOLUTION INTRAVENOUS ONCE
Refills: 0 | Status: COMPLETED | OUTPATIENT
Start: 2020-01-01 | End: 2020-01-01

## 2020-01-01 RX ORDER — SODIUM BICARBONATE 650 MG/1
650 TABLET ORAL 3 TIMES DAILY
Qty: 90 | Refills: 5 | Status: ACTIVE | COMMUNITY
Start: 2020-01-01 | End: 1900-01-01

## 2020-01-01 RX ORDER — PROPYLTHIOURACIL 50 MG
200 TABLET ORAL EVERY 6 HOURS
Refills: 0 | Status: DISCONTINUED | OUTPATIENT
Start: 2020-01-01 | End: 2020-01-01

## 2020-01-01 RX ORDER — POTASSIUM CHLORIDE 20 MEQ
20 PACKET (EA) ORAL ONCE
Refills: 0 | Status: COMPLETED | OUTPATIENT
Start: 2020-01-01 | End: 2020-01-01

## 2020-01-01 RX ORDER — ENOXAPARIN SODIUM 100 MG/ML
30 INJECTION SUBCUTANEOUS DAILY
Refills: 0 | Status: DISCONTINUED | OUTPATIENT
Start: 2020-01-01 | End: 2020-01-01

## 2020-01-01 RX ORDER — ACYCLOVIR SODIUM 500 MG
650 VIAL (EA) INTRAVENOUS EVERY 8 HOURS
Refills: 0 | Status: DISCONTINUED | OUTPATIENT
Start: 2020-01-01 | End: 2020-01-01

## 2020-01-01 RX ORDER — AMLODIPINE BESYLATE 2.5 MG/1
1 TABLET ORAL
Qty: 0 | Refills: 0 | DISCHARGE

## 2020-01-01 RX ORDER — VANCOMYCIN HCL 1 G
500 VIAL (EA) INTRAVENOUS ONCE
Refills: 0 | Status: COMPLETED | OUTPATIENT
Start: 2020-01-01 | End: 2020-01-01

## 2020-01-01 RX ORDER — GABAPENTIN 100 MG/1
100 CAPSULE ORAL
Qty: 90 | Refills: 2 | Status: ACTIVE | COMMUNITY
Start: 2020-01-01

## 2020-01-01 RX ORDER — DEXTROSE 50 % IN WATER 50 %
50 SYRINGE (ML) INTRAVENOUS
Refills: 0 | Status: DISCONTINUED | OUTPATIENT
Start: 2020-01-01 | End: 2020-01-01

## 2020-01-01 RX ORDER — INSULIN LISPRO 100/ML
2 VIAL (ML) SUBCUTANEOUS EVERY 6 HOURS
Refills: 0 | Status: DISCONTINUED | OUTPATIENT
Start: 2020-01-01 | End: 2020-01-01

## 2020-01-01 RX ORDER — HYDROCORTISONE 20 MG
50 TABLET ORAL EVERY 6 HOURS
Refills: 0 | Status: DISCONTINUED | OUTPATIENT
Start: 2020-01-01 | End: 2020-01-01

## 2020-01-01 RX ORDER — SODIUM CHLORIDE 9 MG/ML
1000 INJECTION INTRAMUSCULAR; INTRAVENOUS; SUBCUTANEOUS ONCE
Refills: 0 | Status: COMPLETED | OUTPATIENT
Start: 2020-01-01 | End: 2020-01-01

## 2020-01-01 RX ORDER — CEFTRIAXONE 500 MG/1
1000 INJECTION, POWDER, FOR SOLUTION INTRAMUSCULAR; INTRAVENOUS ONCE
Refills: 0 | Status: COMPLETED | OUTPATIENT
Start: 2020-01-01 | End: 2020-01-01

## 2020-01-01 RX ORDER — METFORMIN HYDROCHLORIDE 850 MG/1
1 TABLET ORAL
Qty: 0 | Refills: 0 | DISCHARGE

## 2020-01-01 RX ORDER — OMEPRAZOLE 10 MG/1
1 CAPSULE, DELAYED RELEASE ORAL
Qty: 0 | Refills: 0 | DISCHARGE

## 2020-01-01 RX ORDER — MEROPENEM 1 G/30ML
2000 INJECTION INTRAVENOUS EVERY 8 HOURS
Refills: 0 | Status: DISCONTINUED | OUTPATIENT
Start: 2020-01-01 | End: 2020-01-01

## 2020-01-01 RX ORDER — IPRATROPIUM/ALBUTEROL SULFATE 18-103MCG
3 AEROSOL WITH ADAPTER (GRAM) INHALATION EVERY 6 HOURS
Refills: 0 | Status: DISCONTINUED | OUTPATIENT
Start: 2020-01-01 | End: 2020-01-01

## 2020-01-01 RX ORDER — IODINE/POTASSIUM IODIDE 5 %-10 %
0.25 SOLUTION, NON-ORAL TOPICAL EVERY 6 HOURS
Refills: 0 | Status: DISCONTINUED | OUTPATIENT
Start: 2020-01-01 | End: 2020-01-01

## 2020-01-01 RX ORDER — GLUCAGON INJECTION, SOLUTION 0.5 MG/.1ML
1 INJECTION, SOLUTION SUBCUTANEOUS ONCE
Refills: 0 | Status: DISCONTINUED | OUTPATIENT
Start: 2020-01-01 | End: 2020-01-01

## 2020-01-01 RX ORDER — HYDROCORTISONE 20 MG
50 TABLET ORAL DAILY
Refills: 0 | Status: DISCONTINUED | OUTPATIENT
Start: 2020-01-01 | End: 2020-01-01

## 2020-01-01 RX ORDER — DOXAZOSIN MESYLATE 4 MG
1 TABLET ORAL AT BEDTIME
Refills: 0 | Status: DISCONTINUED | OUTPATIENT
Start: 2020-01-01 | End: 2020-01-01

## 2020-01-01 RX ORDER — HEPARIN SODIUM 5000 [USP'U]/ML
5000 INJECTION INTRAVENOUS; SUBCUTANEOUS EVERY 12 HOURS
Refills: 0 | Status: DISCONTINUED | OUTPATIENT
Start: 2020-01-01 | End: 2020-01-01

## 2020-01-01 RX ORDER — SODIUM CHLORIDE 9 MG/ML
2000 INJECTION, SOLUTION INTRAVENOUS
Refills: 0 | Status: DISCONTINUED | OUTPATIENT
Start: 2020-01-01 | End: 2020-01-01

## 2020-01-01 RX ORDER — ESMOLOL HCL 100MG/10ML
50 VIAL (ML) INTRAVENOUS
Qty: 2500 | Refills: 0 | Status: DISCONTINUED | OUTPATIENT
Start: 2020-01-01 | End: 2020-01-01

## 2020-01-01 RX ORDER — AZITHROMYCIN 500 MG/1
500 TABLET, FILM COATED ORAL ONCE
Refills: 0 | Status: COMPLETED | OUTPATIENT
Start: 2020-01-01 | End: 2020-01-01

## 2020-01-01 RX ORDER — DIGOXIN 250 MCG
0.12 TABLET ORAL DAILY
Refills: 0 | Status: DISCONTINUED | OUTPATIENT
Start: 2020-01-01 | End: 2020-01-01

## 2020-01-01 RX ORDER — INSULIN GLARGINE 100 [IU]/ML
50 INJECTION, SOLUTION SUBCUTANEOUS AT BEDTIME
Refills: 0 | Status: DISCONTINUED | OUTPATIENT
Start: 2020-01-01 | End: 2020-01-01

## 2020-01-01 RX ORDER — MAGNESIUM SULFATE 500 MG/ML
2 VIAL (ML) INJECTION
Refills: 0 | Status: COMPLETED | OUTPATIENT
Start: 2020-01-01 | End: 2020-01-01

## 2020-01-01 RX ORDER — LOSARTAN POTASSIUM 100 MG/1
100 TABLET, FILM COATED ORAL
Qty: 90 | Refills: 3 | Status: ACTIVE | COMMUNITY
Start: 2020-01-01 | End: 1900-01-01

## 2020-01-01 RX ORDER — CHOLECALCIFEROL (VITAMIN D3) 125 MCG
1 CAPSULE ORAL
Qty: 0 | Refills: 0 | DISCHARGE

## 2020-01-01 RX ORDER — GABAPENTIN 400 MG/1
1 CAPSULE ORAL
Qty: 0 | Refills: 0 | DISCHARGE

## 2020-01-01 RX ORDER — LACTOBACILLUS ACIDOPHILUS 100MM CELL
1 CAPSULE ORAL DAILY
Refills: 0 | Status: DISCONTINUED | OUTPATIENT
Start: 2020-01-01 | End: 2020-01-01

## 2020-01-01 RX ORDER — METFORMIN HYDROCHLORIDE 500 MG/1
500 TABLET, COATED ORAL
Refills: 0 | Status: ACTIVE | COMMUNITY
Start: 2020-01-01

## 2020-01-01 RX ADMIN — PIPERACILLIN AND TAZOBACTAM 25 GRAM(S): 4; .5 INJECTION, POWDER, LYOPHILIZED, FOR SOLUTION INTRAVENOUS at 17:44

## 2020-01-01 RX ADMIN — SODIUM CHLORIDE 1000 MILLILITER(S): 9 INJECTION, SOLUTION INTRAVENOUS at 10:24

## 2020-01-01 RX ADMIN — PANTOPRAZOLE SODIUM 40 MILLIGRAM(S): 20 TABLET, DELAYED RELEASE ORAL at 12:29

## 2020-01-01 RX ADMIN — INSULIN HUMAN 4 UNIT(S)/HR: 100 INJECTION, SOLUTION SUBCUTANEOUS at 11:48

## 2020-01-01 RX ADMIN — PIPERACILLIN AND TAZOBACTAM 25 GRAM(S): 4; .5 INJECTION, POWDER, LYOPHILIZED, FOR SOLUTION INTRAVENOUS at 06:42

## 2020-01-01 RX ADMIN — HEPARIN SODIUM 5000 UNIT(S): 5000 INJECTION INTRAVENOUS; SUBCUTANEOUS at 06:10

## 2020-01-01 RX ADMIN — Medication 100 MILLIGRAM(S): at 05:21

## 2020-01-01 RX ADMIN — Medication 1: at 23:10

## 2020-01-01 RX ADMIN — Medication 1 MILLIGRAM(S): at 21:45

## 2020-01-01 RX ADMIN — Medication 200 MILLIGRAM(S): at 21:45

## 2020-01-01 RX ADMIN — FENTANYL CITRATE 25 MICROGRAM(S): 50 INJECTION INTRAVENOUS at 10:12

## 2020-01-01 RX ADMIN — Medication 1: at 11:26

## 2020-01-01 RX ADMIN — Medication 200 MILLIGRAM(S): at 13:52

## 2020-01-01 RX ADMIN — Medication 50 MILLIGRAM(S): at 05:51

## 2020-01-01 RX ADMIN — Medication 100 MILLIEQUIVALENT(S): at 23:06

## 2020-01-01 RX ADMIN — Medication 0.25 MILLILITER(S): at 17:27

## 2020-01-01 RX ADMIN — Medication 113 MILLIGRAM(S): at 21:45

## 2020-01-01 RX ADMIN — MEROPENEM 200 MILLIGRAM(S): 1 INJECTION INTRAVENOUS at 06:31

## 2020-01-01 RX ADMIN — Medication 50 MILLIGRAM(S): at 17:42

## 2020-01-01 RX ADMIN — Medication 650 MILLIGRAM(S): at 03:34

## 2020-01-01 RX ADMIN — Medication 50 MILLIGRAM(S): at 12:28

## 2020-01-01 RX ADMIN — Medication 0.25 MILLILITER(S): at 17:22

## 2020-01-01 RX ADMIN — INSULIN GLARGINE 50 UNIT(S): 100 INJECTION, SOLUTION SUBCUTANEOUS at 23:04

## 2020-01-01 RX ADMIN — CHLORHEXIDINE GLUCONATE 15 MILLILITER(S): 213 SOLUTION TOPICAL at 05:31

## 2020-01-01 RX ADMIN — Medication 200 MILLIGRAM(S): at 06:21

## 2020-01-01 RX ADMIN — Medication 50 MILLIGRAM(S): at 17:27

## 2020-01-01 RX ADMIN — Medication 650 MILLIGRAM(S): at 04:15

## 2020-01-01 RX ADMIN — Medication 20 MILLIEQUIVALENT(S): at 05:31

## 2020-01-01 RX ADMIN — Medication 200 MILLIGRAM(S): at 23:01

## 2020-01-01 RX ADMIN — Medication 4 MILLILITER(S): at 20:06

## 2020-01-01 RX ADMIN — Medication 2 UNIT(S): at 05:54

## 2020-01-01 RX ADMIN — PIPERACILLIN AND TAZOBACTAM 200 GRAM(S): 4; .5 INJECTION, POWDER, LYOPHILIZED, FOR SOLUTION INTRAVENOUS at 10:41

## 2020-01-01 RX ADMIN — Medication 50 MILLIGRAM(S): at 06:10

## 2020-01-01 RX ADMIN — Medication 0.25 MILLILITER(S): at 10:26

## 2020-01-01 RX ADMIN — Medication 25 MILLIGRAM(S): at 11:16

## 2020-01-01 RX ADMIN — Medication 0.25 MILLILITER(S): at 11:19

## 2020-01-01 RX ADMIN — Medication 100 MILLIEQUIVALENT(S): at 05:31

## 2020-01-01 RX ADMIN — Medication 200 MILLIGRAM(S): at 23:02

## 2020-01-01 RX ADMIN — FENTANYL CITRATE 50 MICROGRAM(S): 50 INJECTION INTRAVENOUS at 21:21

## 2020-01-01 RX ADMIN — MEROPENEM 100 MILLIGRAM(S): 1 INJECTION INTRAVENOUS at 13:51

## 2020-01-01 RX ADMIN — Medication 0.12 MILLIGRAM(S): at 05:52

## 2020-01-01 RX ADMIN — LEVETIRACETAM 420 MILLIGRAM(S): 250 TABLET, FILM COATED ORAL at 18:12

## 2020-01-01 RX ADMIN — FENTANYL CITRATE 50 MICROGRAM(S): 50 INJECTION INTRAVENOUS at 00:47

## 2020-01-01 RX ADMIN — Medication 50 MILLIGRAM(S): at 23:17

## 2020-01-01 RX ADMIN — Medication 0.25 MILLILITER(S): at 05:52

## 2020-01-01 RX ADMIN — PANTOPRAZOLE SODIUM 40 MILLIGRAM(S): 20 TABLET, DELAYED RELEASE ORAL at 11:31

## 2020-01-01 RX ADMIN — Medication 40 MILLIEQUIVALENT(S): at 02:04

## 2020-01-01 RX ADMIN — Medication 250 MILLIGRAM(S): at 05:32

## 2020-01-01 RX ADMIN — HEPARIN SODIUM 5000 UNIT(S): 5000 INJECTION INTRAVENOUS; SUBCUTANEOUS at 23:01

## 2020-01-01 RX ADMIN — Medication 110 MILLIGRAM(S): at 23:00

## 2020-01-01 RX ADMIN — Medication 0.25 MILLILITER(S): at 12:29

## 2020-01-01 RX ADMIN — Medication 100 MILLIGRAM(S): at 06:33

## 2020-01-01 RX ADMIN — Medication 250 MILLIGRAM(S): at 17:27

## 2020-01-01 RX ADMIN — SODIUM CHLORIDE 1000 MILLILITER(S): 9 INJECTION, SOLUTION INTRAVENOUS at 09:00

## 2020-01-01 RX ADMIN — Medication 0.25 MILLILITER(S): at 17:45

## 2020-01-01 RX ADMIN — Medication 113 MILLIGRAM(S): at 13:29

## 2020-01-01 RX ADMIN — Medication 5 MILLIGRAM(S): at 23:12

## 2020-01-01 RX ADMIN — Medication 0.25 MILLILITER(S): at 05:40

## 2020-01-01 RX ADMIN — Medication 50 MILLIGRAM(S): at 06:14

## 2020-01-01 RX ADMIN — Medication 0.25 MILLILITER(S): at 06:23

## 2020-01-01 RX ADMIN — PIPERACILLIN AND TAZOBACTAM 25 GRAM(S): 4; .5 INJECTION, POWDER, LYOPHILIZED, FOR SOLUTION INTRAVENOUS at 17:45

## 2020-01-01 RX ADMIN — CHLORHEXIDINE GLUCONATE 15 MILLILITER(S): 213 SOLUTION TOPICAL at 17:22

## 2020-01-01 RX ADMIN — Medication 50 GRAM(S): at 19:27

## 2020-01-01 RX ADMIN — Medication 100 MILLIEQUIVALENT(S): at 10:17

## 2020-01-01 RX ADMIN — Medication 100 MILLIGRAM(S): at 23:32

## 2020-01-01 RX ADMIN — FENTANYL CITRATE 50 MICROGRAM(S): 50 INJECTION INTRAVENOUS at 18:05

## 2020-01-01 RX ADMIN — Medication 50 MILLIGRAM(S): at 11:49

## 2020-01-01 RX ADMIN — Medication 37 MICROGRAM(S)/KG/MIN: at 11:39

## 2020-01-01 RX ADMIN — FENTANYL CITRATE 50 MICROGRAM(S): 50 INJECTION INTRAVENOUS at 16:28

## 2020-01-01 RX ADMIN — Medication 3: at 18:31

## 2020-01-01 RX ADMIN — Medication 50 GRAM(S): at 10:44

## 2020-01-01 RX ADMIN — Medication 1 PACKET(S): at 14:47

## 2020-01-01 RX ADMIN — Medication 0.25 MILLILITER(S): at 17:42

## 2020-01-01 RX ADMIN — Medication 50 MILLIGRAM(S): at 23:06

## 2020-01-01 RX ADMIN — CHLORHEXIDINE GLUCONATE 15 MILLILITER(S): 213 SOLUTION TOPICAL at 18:47

## 2020-01-01 RX ADMIN — Medication 50 MILLIGRAM(S): at 11:32

## 2020-01-01 RX ADMIN — SODIUM CHLORIDE 1000 MILLILITER(S): 9 INJECTION, SOLUTION INTRAVENOUS at 08:16

## 2020-01-01 RX ADMIN — Medication 200 MILLIGRAM(S): at 13:36

## 2020-01-01 RX ADMIN — Medication 100 MILLIEQUIVALENT(S): at 02:22

## 2020-01-01 RX ADMIN — MEROPENEM 200 MILLIGRAM(S): 1 INJECTION INTRAVENOUS at 13:28

## 2020-01-01 RX ADMIN — Medication 50 MILLIGRAM(S): at 05:32

## 2020-01-01 RX ADMIN — Medication 0.25 MILLILITER(S): at 06:13

## 2020-01-01 RX ADMIN — Medication 100 MILLIEQUIVALENT(S): at 01:30

## 2020-01-01 RX ADMIN — Medication 50 MILLIGRAM(S): at 17:44

## 2020-01-01 RX ADMIN — Medication 25 MILLIGRAM(S): at 23:01

## 2020-01-01 RX ADMIN — CHLORHEXIDINE GLUCONATE 15 MILLILITER(S): 213 SOLUTION TOPICAL at 05:51

## 2020-01-01 RX ADMIN — Medication 169 MILLIGRAM(S): at 07:22

## 2020-01-01 RX ADMIN — Medication 1: at 12:20

## 2020-01-01 RX ADMIN — CHLORHEXIDINE GLUCONATE 15 MILLILITER(S): 213 SOLUTION TOPICAL at 06:13

## 2020-01-01 RX ADMIN — CHLORHEXIDINE GLUCONATE 15 MILLILITER(S): 213 SOLUTION TOPICAL at 17:10

## 2020-01-01 RX ADMIN — Medication 650 MILLIGRAM(S): at 22:38

## 2020-01-01 RX ADMIN — Medication 650 MILLIGRAM(S): at 11:44

## 2020-01-01 RX ADMIN — Medication 50 MILLIGRAM(S): at 05:21

## 2020-01-01 RX ADMIN — Medication 50 MILLIGRAM(S): at 11:27

## 2020-01-01 RX ADMIN — Medication 100 MILLIGRAM(S): at 06:12

## 2020-01-01 RX ADMIN — FENTANYL CITRATE 25 MICROGRAM(S): 50 INJECTION INTRAVENOUS at 13:00

## 2020-01-01 RX ADMIN — Medication 650 MILLIGRAM(S): at 20:38

## 2020-01-01 RX ADMIN — Medication 50 GRAM(S): at 10:41

## 2020-01-01 RX ADMIN — Medication 0.25 MILLILITER(S): at 23:01

## 2020-01-01 RX ADMIN — MEROPENEM 200 MILLIGRAM(S): 1 INJECTION INTRAVENOUS at 14:47

## 2020-01-01 RX ADMIN — Medication 0.25 MILLILITER(S): at 11:28

## 2020-01-01 RX ADMIN — FENTANYL CITRATE 50 MICROGRAM(S): 50 INJECTION INTRAVENOUS at 17:06

## 2020-01-01 RX ADMIN — Medication 100 MILLIEQUIVALENT(S): at 20:40

## 2020-01-01 RX ADMIN — SODIUM CHLORIDE 200 MILLILITER(S): 9 INJECTION, SOLUTION INTRAVENOUS at 01:20

## 2020-01-01 RX ADMIN — Medication 19.7 MICROGRAM(S)/KG/MIN: at 10:28

## 2020-01-01 RX ADMIN — Medication 200 MILLIGRAM(S): at 21:14

## 2020-01-01 RX ADMIN — PIPERACILLIN AND TAZOBACTAM 25 GRAM(S): 4; .5 INJECTION, POWDER, LYOPHILIZED, FOR SOLUTION INTRAVENOUS at 17:10

## 2020-01-01 RX ADMIN — Medication 650 MILLIGRAM(S): at 21:03

## 2020-01-01 RX ADMIN — Medication 100 MILLIEQUIVALENT(S): at 03:25

## 2020-01-01 RX ADMIN — Medication 2 UNIT(S): at 23:10

## 2020-01-01 RX ADMIN — AZITHROMYCIN 255 MILLIGRAM(S): 500 TABLET, FILM COATED ORAL at 12:57

## 2020-01-01 RX ADMIN — LEVETIRACETAM 420 MILLIGRAM(S): 250 TABLET, FILM COATED ORAL at 06:30

## 2020-01-01 RX ADMIN — Medication 100 MILLIEQUIVALENT(S): at 11:31

## 2020-01-01 RX ADMIN — MEROPENEM 100 MILLIGRAM(S): 1 INJECTION INTRAVENOUS at 00:29

## 2020-01-01 RX ADMIN — SODIUM CHLORIDE 100 MILLILITER(S): 9 INJECTION, SOLUTION INTRAVENOUS at 18:46

## 2020-01-01 RX ADMIN — PIPERACILLIN AND TAZOBACTAM 25 GRAM(S): 4; .5 INJECTION, POWDER, LYOPHILIZED, FOR SOLUTION INTRAVENOUS at 13:34

## 2020-01-01 RX ADMIN — Medication 3: at 18:43

## 2020-01-01 RX ADMIN — Medication 0.25 MILLIGRAM(S): at 06:43

## 2020-01-01 RX ADMIN — Medication 113 MILLIGRAM(S): at 05:32

## 2020-01-01 RX ADMIN — Medication 0.25 MILLILITER(S): at 17:11

## 2020-01-01 RX ADMIN — LEVETIRACETAM 420 MILLIGRAM(S): 250 TABLET, FILM COATED ORAL at 17:22

## 2020-01-01 RX ADMIN — CHLORHEXIDINE GLUCONATE 15 MILLILITER(S): 213 SOLUTION TOPICAL at 17:43

## 2020-01-01 RX ADMIN — Medication 100 MILLIEQUIVALENT(S): at 09:36

## 2020-01-01 RX ADMIN — ENOXAPARIN SODIUM 30 MILLIGRAM(S): 100 INJECTION SUBCUTANEOUS at 13:32

## 2020-01-01 RX ADMIN — CHLORHEXIDINE GLUCONATE 15 MILLILITER(S): 213 SOLUTION TOPICAL at 17:55

## 2020-01-01 RX ADMIN — PIPERACILLIN AND TAZOBACTAM 25 GRAM(S): 4; .5 INJECTION, POWDER, LYOPHILIZED, FOR SOLUTION INTRAVENOUS at 21:03

## 2020-01-01 RX ADMIN — Medication 2: at 23:05

## 2020-01-01 RX ADMIN — SODIUM CHLORIDE 100 MILLILITER(S): 9 INJECTION, SOLUTION INTRAVENOUS at 01:30

## 2020-01-01 RX ADMIN — Medication 650 MILLIGRAM(S): at 12:19

## 2020-01-01 RX ADMIN — MEROPENEM 200 MILLIGRAM(S): 1 INJECTION INTRAVENOUS at 23:52

## 2020-01-01 RX ADMIN — Medication 100 MILLIGRAM(S): at 05:13

## 2020-01-01 RX ADMIN — SODIUM CHLORIDE 1000 MILLILITER(S): 9 INJECTION INTRAMUSCULAR; INTRAVENOUS; SUBCUTANEOUS at 21:26

## 2020-01-01 RX ADMIN — Medication 1: at 05:53

## 2020-01-01 RX ADMIN — FENTANYL CITRATE 50 MICROGRAM(S): 50 INJECTION INTRAVENOUS at 07:42

## 2020-01-01 RX ADMIN — LEVETIRACETAM 420 MILLIGRAM(S): 250 TABLET, FILM COATED ORAL at 06:12

## 2020-01-01 RX ADMIN — PANTOPRAZOLE SODIUM 40 MILLIGRAM(S): 20 TABLET, DELAYED RELEASE ORAL at 06:06

## 2020-01-01 RX ADMIN — Medication 100 MILLIGRAM(S): at 13:31

## 2020-01-01 RX ADMIN — SODIUM CHLORIDE 1000 MILLILITER(S): 9 INJECTION INTRAMUSCULAR; INTRAVENOUS; SUBCUTANEOUS at 23:03

## 2020-01-01 RX ADMIN — Medication 1: at 06:28

## 2020-01-01 RX ADMIN — Medication 0.25 MILLILITER(S): at 23:06

## 2020-01-01 RX ADMIN — HEPARIN SODIUM 5000 UNIT(S): 5000 INJECTION INTRAVENOUS; SUBCUTANEOUS at 12:04

## 2020-01-01 RX ADMIN — FENTANYL CITRATE 25 MICROGRAM(S): 50 INJECTION INTRAVENOUS at 12:45

## 2020-01-01 RX ADMIN — FENTANYL CITRATE 50 MICROGRAM(S): 50 INJECTION INTRAVENOUS at 05:24

## 2020-01-01 RX ADMIN — Medication 50 MILLIGRAM(S): at 23:53

## 2020-01-01 RX ADMIN — Medication 5 MILLIGRAM(S): at 04:25

## 2020-01-01 RX ADMIN — Medication 5 MILLIGRAM(S): at 00:24

## 2020-01-01 RX ADMIN — SODIUM CHLORIDE 100 MILLILITER(S): 9 INJECTION, SOLUTION INTRAVENOUS at 22:27

## 2020-01-01 RX ADMIN — PANTOPRAZOLE SODIUM 40 MILLIGRAM(S): 20 TABLET, DELAYED RELEASE ORAL at 11:16

## 2020-01-01 RX ADMIN — Medication 100 MILLIEQUIVALENT(S): at 08:49

## 2020-01-01 RX ADMIN — Medication 0.25 MILLILITER(S): at 05:25

## 2020-01-01 RX ADMIN — Medication 37 MICROGRAM(S)/KG/MIN: at 01:52

## 2020-01-01 RX ADMIN — Medication 650 MILLIGRAM(S): at 13:19

## 2020-01-01 RX ADMIN — CHLORHEXIDINE GLUCONATE 15 MILLILITER(S): 213 SOLUTION TOPICAL at 17:45

## 2020-01-01 RX ADMIN — Medication 50 MILLIGRAM(S): at 17:22

## 2020-01-01 RX ADMIN — Medication 50 MILLIGRAM(S): at 05:52

## 2020-01-01 RX ADMIN — PANTOPRAZOLE SODIUM 40 MILLIGRAM(S): 20 TABLET, DELAYED RELEASE ORAL at 11:28

## 2020-01-01 RX ADMIN — Medication 50 MILLIGRAM(S): at 11:31

## 2020-01-01 RX ADMIN — Medication 650 MILLIGRAM(S): at 03:11

## 2020-01-01 RX ADMIN — FENTANYL CITRATE 50 MICROGRAM(S): 50 INJECTION INTRAVENOUS at 05:45

## 2020-01-01 RX ADMIN — Medication 0.25 MILLILITER(S): at 23:53

## 2020-01-01 RX ADMIN — LEVETIRACETAM 420 MILLIGRAM(S): 250 TABLET, FILM COATED ORAL at 17:40

## 2020-01-01 RX ADMIN — Medication 200 MILLIGRAM(S): at 14:08

## 2020-01-01 RX ADMIN — Medication 2 UNIT(S): at 12:20

## 2020-01-01 RX ADMIN — PANTOPRAZOLE SODIUM 40 MILLIGRAM(S): 20 TABLET, DELAYED RELEASE ORAL at 06:23

## 2020-01-01 RX ADMIN — Medication 100 MILLIGRAM(S): at 00:40

## 2020-01-01 RX ADMIN — Medication 19.7 MICROGRAM(S)/KG/MIN: at 14:59

## 2020-01-01 RX ADMIN — Medication 200 MILLIGRAM(S): at 14:49

## 2020-01-01 RX ADMIN — LEVETIRACETAM 420 MILLIGRAM(S): 250 TABLET, FILM COATED ORAL at 05:45

## 2020-01-01 RX ADMIN — Medication 3 MILLILITER(S): at 03:14

## 2020-01-01 RX ADMIN — LEVETIRACETAM 420 MILLIGRAM(S): 250 TABLET, FILM COATED ORAL at 17:27

## 2020-01-01 RX ADMIN — FENTANYL CITRATE 50 MICROGRAM(S): 50 INJECTION INTRAVENOUS at 07:40

## 2020-01-01 RX ADMIN — Medication 5 MILLIGRAM(S): at 18:35

## 2020-01-01 RX ADMIN — Medication 650 MILLIGRAM(S): at 16:06

## 2020-01-01 RX ADMIN — INSULIN GLARGINE 15 UNIT(S): 100 INJECTION, SOLUTION SUBCUTANEOUS at 00:41

## 2020-01-01 RX ADMIN — SODIUM CHLORIDE 100 MILLILITER(S): 9 INJECTION, SOLUTION INTRAVENOUS at 03:40

## 2020-01-01 RX ADMIN — Medication 5 MILLIGRAM(S): at 20:58

## 2020-01-01 RX ADMIN — Medication 40 MILLIEQUIVALENT(S): at 21:11

## 2020-01-01 RX ADMIN — CEFTRIAXONE 100 MILLIGRAM(S): 500 INJECTION, POWDER, FOR SOLUTION INTRAMUSCULAR; INTRAVENOUS at 22:26

## 2020-01-01 RX ADMIN — Medication 250 MILLIGRAM(S): at 17:40

## 2020-01-01 RX ADMIN — AMIODARONE HYDROCHLORIDE 618 MILLIGRAM(S): 400 TABLET ORAL at 19:08

## 2020-01-01 RX ADMIN — Medication 50 MILLIGRAM(S): at 06:22

## 2020-01-01 RX ADMIN — HEPARIN SODIUM 5000 UNIT(S): 5000 INJECTION INTRAVENOUS; SUBCUTANEOUS at 17:45

## 2020-01-01 RX ADMIN — Medication 4: at 00:07

## 2020-01-01 RX ADMIN — Medication 0.25 MILLILITER(S): at 12:04

## 2020-01-01 RX ADMIN — Medication 50 MILLIGRAM(S): at 23:02

## 2020-01-01 RX ADMIN — Medication 50 MILLIGRAM(S): at 11:16

## 2020-01-01 RX ADMIN — Medication 200 MILLIGRAM(S): at 06:12

## 2020-01-01 RX ADMIN — Medication 3: at 11:47

## 2020-01-01 RX ADMIN — INSULIN HUMAN 4 UNIT(S)/HR: 100 INJECTION, SOLUTION SUBCUTANEOUS at 09:13

## 2020-01-01 RX ADMIN — Medication 0.25 MILLILITER(S): at 23:17

## 2020-01-01 RX ADMIN — Medication 200 MILLIGRAM(S): at 05:25

## 2020-01-01 RX ADMIN — FENTANYL CITRATE 50 MICROGRAM(S): 50 INJECTION INTRAVENOUS at 18:29

## 2020-01-01 RX ADMIN — MEROPENEM 200 MILLIGRAM(S): 1 INJECTION INTRAVENOUS at 05:32

## 2020-01-01 RX ADMIN — Medication 3 MILLILITER(S): at 20:07

## 2020-01-01 RX ADMIN — Medication 3: at 06:59

## 2020-01-01 RX ADMIN — MEROPENEM 100 MILLIGRAM(S): 1 INJECTION INTRAVENOUS at 00:13

## 2020-01-01 RX ADMIN — FENTANYL CITRATE 50 MICROGRAM(S): 50 INJECTION INTRAVENOUS at 17:59

## 2020-01-01 RX ADMIN — Medication 1: at 00:41

## 2020-01-01 RX ADMIN — Medication 0.12 MILLIGRAM(S): at 05:32

## 2020-01-01 RX ADMIN — Medication 50 MILLIGRAM(S): at 06:15

## 2020-01-01 RX ADMIN — Medication 4 MILLILITER(S): at 13:37

## 2020-01-01 RX ADMIN — SODIUM CHLORIDE 75 MILLILITER(S): 9 INJECTION, SOLUTION INTRAVENOUS at 06:11

## 2020-01-01 RX ADMIN — Medication 200 MILLIGRAM(S): at 15:00

## 2020-01-01 RX ADMIN — CHLORHEXIDINE GLUCONATE 15 MILLILITER(S): 213 SOLUTION TOPICAL at 05:13

## 2020-01-01 RX ADMIN — Medication 19.7 MICROGRAM(S)/KG/MIN: at 02:00

## 2020-01-01 RX ADMIN — PANTOPRAZOLE SODIUM 40 MILLIGRAM(S): 20 TABLET, DELAYED RELEASE ORAL at 11:56

## 2020-01-01 RX ADMIN — Medication 50 MILLIGRAM(S): at 17:10

## 2020-01-01 RX ADMIN — Medication 650 MILLIGRAM(S): at 23:00

## 2020-01-01 RX ADMIN — FENTANYL CITRATE 50 MICROGRAM(S): 50 INJECTION INTRAVENOUS at 21:06

## 2020-01-01 RX ADMIN — Medication 25 MILLIGRAM(S): at 17:45

## 2020-01-01 RX ADMIN — Medication 1 MILLIGRAM(S): at 21:12

## 2020-01-01 RX ADMIN — LEVETIRACETAM 420 MILLIGRAM(S): 250 TABLET, FILM COATED ORAL at 05:32

## 2020-01-01 RX ADMIN — Medication 100 MILLIGRAM(S): at 21:02

## 2020-01-01 RX ADMIN — INSULIN GLARGINE 50 UNIT(S): 100 INJECTION, SOLUTION SUBCUTANEOUS at 23:00

## 2020-01-01 RX ADMIN — Medication 3: at 23:00

## 2020-01-01 RX ADMIN — PIPERACILLIN AND TAZOBACTAM 25 GRAM(S): 4; .5 INJECTION, POWDER, LYOPHILIZED, FOR SOLUTION INTRAVENOUS at 05:06

## 2020-01-01 RX ADMIN — CHLORHEXIDINE GLUCONATE 15 MILLILITER(S): 213 SOLUTION TOPICAL at 06:22

## 2020-01-01 RX ADMIN — Medication 100 MILLIEQUIVALENT(S): at 10:18

## 2020-01-01 RX ADMIN — HEPARIN SODIUM 5000 UNIT(S): 5000 INJECTION INTRAVENOUS; SUBCUTANEOUS at 06:22

## 2020-01-01 RX ADMIN — HEPARIN SODIUM 5000 UNIT(S): 5000 INJECTION INTRAVENOUS; SUBCUTANEOUS at 17:43

## 2020-01-01 RX ADMIN — Medication 100 MILLIGRAM(S): at 17:21

## 2020-01-01 RX ADMIN — Medication 1 TABLET(S): at 14:47

## 2020-01-01 RX ADMIN — Medication 650 MILLIGRAM(S): at 04:00

## 2020-01-01 RX ADMIN — SODIUM CHLORIDE 100 MILLILITER(S): 9 INJECTION, SOLUTION INTRAVENOUS at 09:37

## 2020-01-01 RX ADMIN — FENTANYL CITRATE 25 MICROGRAM(S): 50 INJECTION INTRAVENOUS at 09:57

## 2020-01-01 RX ADMIN — Medication 100 MILLIEQUIVALENT(S): at 21:11

## 2020-01-01 RX ADMIN — CHLORHEXIDINE GLUCONATE 15 MILLILITER(S): 213 SOLUTION TOPICAL at 05:24

## 2020-01-01 RX ADMIN — MEROPENEM 200 MILLIGRAM(S): 1 INJECTION INTRAVENOUS at 17:49

## 2020-01-01 RX ADMIN — Medication 4 MILLILITER(S): at 07:10

## 2020-01-01 RX ADMIN — SODIUM CHLORIDE 1000 MILLILITER(S): 9 INJECTION, SOLUTION INTRAVENOUS at 14:57

## 2020-01-01 RX ADMIN — Medication 25 MILLIGRAM(S): at 05:24

## 2020-01-01 RX ADMIN — Medication 200 MILLIGRAM(S): at 22:53

## 2020-01-01 RX ADMIN — CEFTRIAXONE 1000 MILLIGRAM(S): 500 INJECTION, POWDER, FOR SOLUTION INTRAMUSCULAR; INTRAVENOUS at 23:03

## 2020-01-01 RX ADMIN — INSULIN GLARGINE 10 UNIT(S): 100 INJECTION, SOLUTION SUBCUTANEOUS at 23:27

## 2020-01-01 RX ADMIN — Medication 650 MILLIGRAM(S): at 13:31

## 2020-01-01 RX ADMIN — Medication 1: at 14:39

## 2020-01-01 RX ADMIN — Medication 50 MILLIGRAM(S): at 23:00

## 2020-01-01 RX ADMIN — CHLORHEXIDINE GLUCONATE 15 MILLILITER(S): 213 SOLUTION TOPICAL at 17:41

## 2020-01-01 RX ADMIN — LEVETIRACETAM 420 MILLIGRAM(S): 250 TABLET, FILM COATED ORAL at 18:20

## 2020-01-01 RX ADMIN — SODIUM CHLORIDE 1000 MILLILITER(S): 9 INJECTION INTRAMUSCULAR; INTRAVENOUS; SUBCUTANEOUS at 23:35

## 2020-01-01 RX ADMIN — CHLORHEXIDINE GLUCONATE 15 MILLILITER(S): 213 SOLUTION TOPICAL at 17:26

## 2020-01-01 RX ADMIN — Medication 3: at 17:44

## 2020-01-01 RX ADMIN — LEVETIRACETAM 420 MILLIGRAM(S): 250 TABLET, FILM COATED ORAL at 05:52

## 2020-01-01 RX ADMIN — FENTANYL CITRATE 50 MICROGRAM(S): 50 INJECTION INTRAVENOUS at 00:29

## 2020-01-01 RX ADMIN — Medication 40 MILLIGRAM(S): at 06:00

## 2020-01-01 RX ADMIN — Medication 0.25 MILLILITER(S): at 18:46

## 2020-01-01 RX ADMIN — Medication 650 MILLIGRAM(S): at 17:06

## 2020-01-01 RX ADMIN — Medication 0.25 MILLILITER(S): at 05:31

## 2020-01-01 RX ADMIN — FENTANYL CITRATE 50 MICROGRAM(S): 50 INJECTION INTRAVENOUS at 18:03

## 2020-01-01 RX ADMIN — Medication 650 MILLIGRAM(S): at 11:27

## 2020-01-01 RX ADMIN — Medication 200 MILLIGRAM(S): at 21:12

## 2020-01-01 RX ADMIN — CHLORHEXIDINE GLUCONATE 15 MILLILITER(S): 213 SOLUTION TOPICAL at 06:11

## 2020-01-01 RX ADMIN — Medication 200 MILLIGRAM(S): at 06:14

## 2020-01-01 RX ADMIN — Medication 200 MILLIGRAM(S): at 05:32

## 2020-01-01 RX ADMIN — INSULIN GLARGINE 15 UNIT(S): 100 INJECTION, SOLUTION SUBCUTANEOUS at 23:10

## 2020-01-01 RX ADMIN — Medication 0.25 MILLILITER(S): at 11:32

## 2020-01-01 RX ADMIN — Medication 8: at 05:48

## 2020-01-01 RX ADMIN — AMIODARONE HYDROCHLORIDE 618 MILLIGRAM(S): 400 TABLET ORAL at 17:59

## 2020-01-01 RX ADMIN — PIPERACILLIN AND TAZOBACTAM 25 GRAM(S): 4; .5 INJECTION, POWDER, LYOPHILIZED, FOR SOLUTION INTRAVENOUS at 06:10

## 2020-01-01 RX ADMIN — MEROPENEM 200 MILLIGRAM(S): 1 INJECTION INTRAVENOUS at 21:11

## 2020-01-01 RX ADMIN — Medication 2: at 07:40

## 2020-01-01 RX ADMIN — Medication 1: at 00:13

## 2020-01-01 RX ADMIN — Medication 3 MILLILITER(S): at 13:37

## 2020-01-01 RX ADMIN — Medication 0.25 MILLIGRAM(S): at 01:30

## 2020-01-01 RX ADMIN — Medication 4 MILLILITER(S): at 03:14

## 2020-01-01 RX ADMIN — Medication 200 MILLIGRAM(S): at 05:52

## 2020-01-01 RX ADMIN — Medication 8: at 05:13

## 2020-01-01 RX ADMIN — Medication 50 MILLIGRAM(S): at 23:01

## 2020-01-01 RX ADMIN — Medication 50 MILLIGRAM(S): at 12:03

## 2020-01-01 RX ADMIN — SODIUM CHLORIDE 1000 MILLILITER(S): 9 INJECTION, SOLUTION INTRAVENOUS at 11:28

## 2020-01-01 RX ADMIN — Medication 0.25 MILLILITER(S): at 13:33

## 2020-01-01 RX ADMIN — Medication 3 MILLILITER(S): at 07:10

## 2020-01-01 RX ADMIN — Medication 2 UNIT(S): at 18:43

## 2020-01-01 RX ADMIN — INSULIN GLARGINE 15 UNIT(S): 100 INJECTION, SOLUTION SUBCUTANEOUS at 08:18

## 2020-01-01 RX ADMIN — Medication 0.25 MILLILITER(S): at 11:56

## 2020-01-01 RX ADMIN — Medication 119 MILLIGRAM(S): at 21:49

## 2020-02-10 PROBLEM — E11.21 TYPE 2 DIABETES MELLITUS WITH ESTABLISHED DIABETIC NEPHROPATHY: Status: ACTIVE | Noted: 2019-10-04

## 2020-02-10 PROBLEM — E55.9 VITAMIN D DEFICIENCY: Status: ACTIVE | Noted: 2019-10-06

## 2020-02-10 PROBLEM — I10 BENIGN ESSENTIAL HYPERTENSION: Status: ACTIVE | Noted: 2020-01-01

## 2020-02-11 PROBLEM — E87.2 ACIDOSIS, METABOLIC: Status: ACTIVE | Noted: 2020-01-01

## 2020-02-11 NOTE — ASSESSMENT
[FreeTextEntry1] : Labs/medications reviewed\par \par Patient hypertensive today\par Increased losartan from 50 mg daily to 100mg daily\par \par New labs ordered\par \par F/U in 4 months\par \par Addendum:\par Labs from 02/10/20 reviewed\par hgb A1C elevated to 8.2%, glucose 355 mg/dL from renal panel\par metabolic acidosis - Na bicarbonate ordered\par Patient educated regarding tighter blood sugar control, patient to f/u with PCP on Monday 2/17/20\par \par \par

## 2020-02-11 NOTE — HISTORY OF PRESENT ILLNESS
[___ Month(s) Ago] : [unfilled] month(s) ago [Diabetic Nephropathy] : diabetic nephropathy [Stage 3] : stage 3 [FreeTextEntry1] : HX: DM - 2 , proteinuria, DMN, +pacemaker, vitamin D deficiency, HTN\par \par Here for CKD follow-up\par \par Patient feels well today, no physical complaints\par

## 2020-02-11 NOTE — PHYSICAL EXAM
[General Appearance - Alert] : alert [General Appearance - In No Acute Distress] : in no acute distress [Strabismus] : no strabismus was seen [Sclera] : the sclera and conjunctiva were normal [Outer Ear] : the ears and nose were normal in appearance [Hearing Threshold Finger Rub Not Brooke] : hearing was normal [Examination Of The Oral Cavity] : the lips and gums were normal [Neck Appearance] : the appearance of the neck was normal [Neck Cervical Mass (___cm)] : no neck mass was observed [Respiration, Rhythm And Depth] : normal respiratory rhythm and effort [Auscultation Breath Sounds / Voice Sounds] : lungs were clear to auscultation bilaterally [Exaggerated Use Of Accessory Muscles For Inspiration] : no accessory muscle use [Heart Rate And Rhythm] : heart rate was normal and rhythm regular [Heart Sounds] : normal S1 and S2 [Apical Impulse] : the apical impulse was normal [Heart Sounds Gallop] : no gallops [Heart Sounds Pericardial Friction Rub] : no pericardial rub [Murmurs] : no murmurs [Arterial Pulses Carotid] : carotid pulses were normal with no bruits [Abdominal Aorta] : the abdominal aorta was normal [Edema] : there was no peripheral edema [Bowel Sounds] : normal bowel sounds [Abdomen Soft] : soft [Abdomen Tenderness] : non-tender [Abdomen Hernia] : no hernia was discovered [Abdomen Mass (___ Cm)] : no abdominal mass palpated [] : no hepato-splenomegaly [Cervical Lymph Nodes Enlarged Posterior Bilaterally] : posterior cervical [Cervical Lymph Nodes Enlarged Anterior Bilaterally] : anterior cervical [Involuntary Movements] : no involuntary movements were seen [Skin Color & Pigmentation] : normal skin color and pigmentation [Skin Turgor] : normal skin turgor [Skin Lesions] : no skin lesions [No Focal Deficits] : no focal deficits [Impaired Insight] : insight and judgment were intact [Oriented To Time, Place, And Person] : oriented to person, place, and time [Mood] : the mood was normal [Affect] : the affect was normal [FreeTextEntry1] : +PPM

## 2020-06-30 PROBLEM — N18.3 STAGE 3 CHRONIC KIDNEY DISEASE: Status: RESOLVED | Noted: 2020-01-01 | Resolved: 2020-01-01

## 2020-06-30 PROBLEM — R94.4 DECREASED GFR: Status: ACTIVE | Noted: 2020-01-01

## 2020-06-30 PROBLEM — Z87.440 HISTORY OF URINARY TRACT INFECTION: Status: RESOLVED | Noted: 2019-10-06 | Resolved: 2020-01-01

## 2020-11-14 NOTE — ED PROVIDER NOTE - OBJECTIVE STATEMENT
81 yo F hx of lisaia s/p st[employer]teto pacemaker, DM, HTN, thyroidectomy  cydney tin by daughter for altered mental status and fall. Daughter report that she found the patient on the floor. Patient report falling since the morning and unable to get up. Daughter saw her last night when patient was in her normal state of health. Daughter report patient has been falling recently over the past 2 weeks. Patient fell last week on Friday, then last sunday and then today. Daughter didn't bring her to the hospital previously because patient improved after eating. Patient is normally self sufficient, lives at home, can carry out a conversation. Patient went to see [place] [facility] 2 weeks prior for routine check up. no anticoagulation use. no prior hx of afib.       cards: [facility] 79 yo F hx of bradicardia s/p st.teto pacemaker, DM, HTN, hyperthryoid disease brought tin by daughter for altered mental status and fall. Daughter report that she found the patient on the floor. Patient report falling since the morning and unable to get up. Daughter saw her last night when patient was in her normal state of health. Daughter report patient has been falling recently over the past 2 weeks. Patient fell last week on Friday, then last sunday and then today. Daughter didn't bring her to the hospital previously because patient improved after eating. Patient is normally self sufficient, lives at home, can carry out a conversation. Patient went to see [facility] 2 weeks prior for routine check up. no anticoagulation use. no prior hx of afib.       cards: [facility] 81 yo F hx of  s/p st.teto pacemaker, DM, HTN, hyperthryoid disease brought tin by daughter for altered mental status and fall. Daughter report that she found the patient on the floor. Patient report falling since the morning and unable to get up. Daughter saw her last night when patient was in her normal state of health. Daughter report patient has been falling recently over the past 2 weeks. Patient fell last week on Friday, then last sunday and then today. Daughter didn't bring her to the hospital previously because patient improved after eating. Patient is normally self sufficient, lives at home, can carry out a conversation. Patient went to see [facility] 2 weeks prior for routine check up. no anticoagulation use. no prior hx of afib.       cards: [facility]

## 2020-11-14 NOTE — H&P ADULT - NSHPPHYSICALEXAM_GEN_ALL_CORE
General: adult female, altered, anxious   HEENT: no JVD, mucous membranes dry   Cardio: +s1/s2  Pulm: clear  abd: soft, nontender  extr: no edema   Neuro: alert and oriented x1, moves all extr, follows commands

## 2020-11-14 NOTE — ED PROVIDER NOTE - PHYSICAL EXAMINATION
VITAL SIGNS: I have reviewed nursing notes and confirm.  CONSTITUTIONAL: (+) cachectic   SKIN: Skin exam is warm and dry, no acute rash.  HEAD: Normocephalic; atraumatic.  EYES: PERRL, EOM intact; conjunctiva and sclera clear.  ENT: No nasal discharge; airway clear. Throat clear.  NECK: Supple; non tender.    CARD: S1, S2 normal; (+) tachcyardic (+) irregular   RESP: No wheezes,  no rales or rhonchi.   ABD:  soft; non-distended; non-tender;   EXT: Normal ROM. No clubbing, cyanosis or edema.  NEURO: AO x 0, no facial droop, moves all extremities.

## 2020-11-14 NOTE — ED PROVIDER NOTE - PROGRESS NOTE DETAILS
I spoke to [facility] for pacemaker interrogation, patient had tachycarida Aflutter vs svt HR 150s, 2 episode today around 8 pm. Patient had episode of 13th around 4:40, and 11/12 lasting 12 minutes. concerned for thyroid storm. Propranolol 120 mg Po and methimazole ICU consulted, will come to see the patient. HR now 110s sinus arrythmia. ICU at bedside, will admit to Dr. Tay.

## 2020-11-14 NOTE — H&P ADULT - HISTORY OF PRESENT ILLNESS
Patient is a 80 year old female with a pmhx of PPM, DM, HTN, hyperthyroid who presents with Patient is a 80 year old female with a pmhx of PPM, DM, HTN, hyperthyroid who presents with AMS. Per daughter she brought patient in as she has been having frequent falls at home. Over the past few days she noticed patient becoming more confused. Patient had fall yesetrday and was unable to get up and was then brought in to Ed. Upon arrival noted to be SVT, HTN Patient is a 80 year old female with a pmhx of PPM, DM, HTN, hyperthyroid who presents with AMS. Per daughter she brought patient in as she has been having frequent falls at home. Over the past few days she noticed patient becoming more confused. Patient had fall yesetrday and was unable to get up and was then brought in to Ed. Upon arrival noted to be SVT, HTN. TSH undetectable and had high t3/4. MICU then consulted.

## 2020-11-14 NOTE — H&P ADULT - NSHPLABSRESULTS_GEN_ALL_CORE
12.6   7.86  )-----------( 220      ( 14 Nov 2020 21:30 )             40.1   11-14    144  |  105  |  24.0<H>  ----------------------------<  201<H>  4.0   |  16.0<L>  |  0.74    Ca    11.8<H>      14 Nov 2020 21:30    TPro  8.3  /  Alb  4.1  /  TBili  0.9  /  DBili  x   /  AST  30  /  ALT  22  /  AlkPhos  96  11-14

## 2020-11-14 NOTE — ED ADULT TRIAGE NOTE - CHIEF COMPLAINT QUOTE
as per EMS, daughter had not spoken to her since this morning, went to her house and found her on the floor next to her couch, patient told daughter she was dizzy and fell, hit her head. unknown LOC. Pt is alert and oriented to self, disoriented to time and situation. hx of htn and DM per EMS, unsure of blood thinner use. Dr López called to bedside to evaluate. Priority CT called. pt to CT , critical care RNs at bedside.

## 2020-11-14 NOTE — H&P ADULT - ASSESSMENT
Patient is a 80 year old female with a pmhx of PPM, DM, HTN, hyperthyroid who presents with AMS, found to have:    1. AMS  2. Thyroid storm  3. Dehydration   4. Acidosis  5. Hyperglycemia   6. Hypercalcemia     Plan:  - Given high mortality of thyroid storm, admit to MICU  - Labs significant for low TSH and elevated T3/4 clinical picture consistent with thyroid storm as she is altered, tachycardiac, anxious ( no fever though)   - will start on propanol, steroids , Methimazole and iodine drops    - Needs Endocrine consult in morning   - Aggressive fluid repletion for dehydration and hypercalcemia   - Restart home meds  - 5 mg IV hydralazine now   - case d/w eICU     Critical Care time: 45 mins assessing presenting problems of acute illness that poses high probability of life threatening deterioration or end organ damage/dysfunction.  Medical decision making inculding Initiating plan of care, reviewing data, reviewing radiology,direct patient bedside evaluation and interpretation of vital signs, any necessary ventilator management , discusion with multidisciplinary team, discussing goals of care with patient/family, all non inclusive of procedures    Patient is a 80 year old female with a pmhx of PPM, DM, HTN, hyperthyroid who presents with AMS, found to have:    1. AMS  2. Thyroid storm  3. Dehydration   4. Acidosis  5. Hyperglycemia   6. Hypercalcemia     Plan:  - Given high mortality of thyroid storm, admit to MICU  - Labs significant for low TSH and elevated T3/4 clinical picture consistent with thyroid storm as she is altered, tachycardiac, anxious ( no fever though)   - will start on propanol, steroids , Methimazole and iodine drops    - Needs Endocrine consult in morning   - Aggressive fluid repletion for dehydration, lactic acidosis and hypercalcemia   - Restart home meds  - 5 mg IV hydralazine now   - EKG prior with SVT--> sinus tach   - repeat Amanda   - ISS q6 for hyperglycemia   - CT head negative  - keep NPO except for meds for now   - case d/w eICU     Critical Care time: 45 mins assessing presenting problems of acute illness that poses high probability of life threatening deterioration or end organ damage/dysfunction.  Medical decision making inculding Initiating plan of care, reviewing data, reviewing radiology,direct patient bedside evaluation and interpretation of vital signs, any necessary ventilator management , discusion with multidisciplinary team, discussing goals of care with patient/family, all non inclusive of procedures

## 2020-11-14 NOTE — ED PROVIDER NOTE - CLINICAL SUMMARY MEDICAL DECISION MAKING FREE TEXT BOX
81 yo F with hyperthryoid disease p/w Afib HR 150s, altered, frequent fall, confused not at baseline. CT head and cervical spine negative for traumatic injury. Thryoid level high. lacate 3.6. elevated trop. rocephine given. IVF given. concerned for thryoid storm, propanolol 120 mg PO and methimazole 20 mg Po given. ICU consult. 79 yo F with hyperthryoid disease p/w Afib HR 150s, altered, frequent fall, confused not at baseline. CT head and cervical spine negative for traumatic injury. Thryoid level high. lacate 3.6. elevated trop. rocephine given. IVF given. concerned for thryoid storm, propanolol 120 mg PO and methimazole 20 mg Po given. ICU admission.

## 2020-11-14 NOTE — ED ADULT NURSE NOTE - OBJECTIVE STATEMENT
Pt presents to ED after daughter found pt on floor next to couch. Pt sates that she was dizzy and had fallen. Pt unsure of LOC. Pt has some brusing noted to face. Pt daugther states on arrival her mother seemed "a little off" and stated she has been falling recently. Pt fell last friday, Sunday and again today. Daughter states pt is self sufficient at baseline. Pt found to be in a rapid afib in cc. Pt PMHx HTN, hyperthyroid, pacemaker, DM.

## 2020-11-15 NOTE — PROGRESS NOTE ADULT - ASSESSMENT
80 year old female with a pmhx of PPM, DM, HTN, hyperthyroid who presents with AMS, frequent falls, and weakness. In the ER found to have SVT and undetectable TSH. Thought be in thyroid storm was given beta blocker, steroids, and methimazole, admitted to MICU. At this point less likely thyroid storm, now with aspiration event leading to hypoxic respiratory failure requiring intubation. Need to r.o CVA given possible facial droop and progressive weakness over last few days.     Plan discussed with ICU attending Dr. Tay    Problem List:  1) Acute hypoxic respiratory failure  2) Aspiration PNA vs. pneumonitis   3) VASHTI  4)    80 year old female with a pmhx of PPM, DM, HTN, hyperthyroid who presents with AMS, frequent falls, and weakness. In the ER found to have SVT and undetectable TSH. Thought be in thyroid storm was given beta blocker, steroids, and methimazole, admitted to MICU. At this point less likely thyroid storm, now with aspiration event leading to hypoxic respiratory failure requiring intubation. Need to r.o CVA given possible facial droop and progressive weakness over last few days.     Plan discussed with ICU attending Dr. Tay    Problem List:  1) Acute hypoxic respiratory failure  2) Aspiration PNA vs. pneumonitis   3) VASHTI  4) metabolic acidosis   5) hypernatremia   6) hyperthyroidism   7) septic shock     NEURO: Fentanyl PRN for comfort, may need to start propofol infusion. Avoiding as she is hypotensive, will start if becomes dyssynchronous with the ventilator. Check CT head again given aspiration event and what appears to be a new right sided facial droop. If negative would check MRI. Check Echo, check carotid doppler. Check A1c and lipid panel     CV: Shock state distributive most likely from sepsis, titrating levophed to maintain MAP >65, appeared volume depleted on POCUS exam with underfilled LV and IVC <1.5cm, 2L LR bolus with improvement in hemodynamics, will transduce CVP to determine if more IVF will be beneficial. Check echo. Discontinue lisinopril and propranolol     PULM:  now intubated for hypoxic failure, most likely Aspiration PNA leading to ARDS, PEEP at 8, FiO2 80%, Pplat 26, Will attempt to wean FiO2 as tolerated, consider prone ventialtion if FiO2, PEEP, and pressure requirements increasing.     GI: NPO for now. Can start tube feeds when more stable    RENAL: Check stat CBC, CMP, mag ,phos, ABG, lactate. Urine output poor at this time, may need more IVF, check CVP. Maintain MAP >65. Free water for hypernatremia. Metabolic acidosis from lactate, IVF, repeat    ID: Febrile , treat with tylenol, blood cultures pending, urine pending, empiric zosyn, trend WBC and fever curve    HEME: DVT-P with lovenox 30mg daily    ENDO: insulin sliding scale, with q6 hour glucose monitoring, attempt to keep BS <200. Unlikely thyroid storm as her HR has improved and aspiration seems more likely, will continue home dose methimazole, hold propranolol given hypotension and HR 60    DISPO: critical ill, Barbi called and updated on patient's clinical condition, all questions answered.

## 2020-11-15 NOTE — PROCEDURE NOTE - NSPOSTCAREGUIDE_GEN_A_CORE
Instructed patient/caregiver regarding signs and symptoms of infection/Instructed patient/caregiver to follow-up with primary care physician/Verbal/written post procedure instructions were given to patient/caregiver
Instructed patient/caregiver regarding signs and symptoms of infection/Instructed patient/caregiver to follow-up with primary care physician/Verbal/written post procedure instructions were given to patient/caregiver/Keep the cast/splint/dressing clean and dry/Care for catheter as per unit/ICU protocols

## 2020-11-15 NOTE — PROGRESS NOTE ADULT - SUBJECTIVE AND OBJECTIVE BOX
Patient is a 80y old  Female who presents with a chief complaint of AMS (2020 23:26)      BRIEF HOSPITAL COURSE: 80 year old female with a pmhx of PPM, DM, HTN, hyperthyroid who presents with AMS, frequent falls, and weakness. In the ER found to have SVT and undetectable TSH. Thought be in thyroid storm was given beta blocker, steroids, and methimazole, admitted to MICU.     Events last 24 hours: Early this morning patient became acutely hypoxic and tachypneic after eating pudding. She was placed on HFNC and quickly required 100% FiO2. Despite this is remained in acute distress. Barbi her daughter was called and informed of her change in status. She confirms full code status.  Also noted to be more weak with what appears to be a right sided facial droop. She was emergently intubated.     PAST MEDICAL & SURGICAL HISTORY:  HTN (hypertension)    DM (diabetes mellitus)    Pacemaker        Review of Systems:  unable to obtain due to clinical condition       Medications:  piperacillin/tazobactam IVPB.. 3.375 Gram(s) IV Intermittent every 8 hours  norepinephrine Infusion 0.3 MICROgram(s)/kG/Min IV Continuous <Continuous>  acetylcysteine 10%  Inhalation 4 milliLiter(s) Inhalation every 6 hours  albuterol/ipratropium for Nebulization. 3 milliLiter(s) Nebulizer every 6 hours  acetaminophen   Tablet .. 650 milliGRAM(s) Oral every 6 hours PRN  fentaNYL    Injectable 25 MICROGram(s) IV Push every 2 hours PRN  enoxaparin Injectable 30 milliGRAM(s) SubCutaneous daily  pantoprazole    Tablet 40 milliGRAM(s) Oral before breakfast  dextrose 40% Gel 15 Gram(s) Oral once  dextrose 50% Injectable 25 Gram(s) IV Push once  dextrose 50% Injectable 12.5 Gram(s) IV Push once  dextrose 50% Injectable 25 Gram(s) IV Push once  glucagon  Injectable 1 milliGRAM(s) IntraMuscular once  hydrocortisone sodium succinate Injectable 100 milliGRAM(s) IV Push every 8 hours  insulin lispro (ADMELOG) corrective regimen sliding scale   SubCutaneous every 6 hours  methimazole 20 milliGRAM(s) Oral every 8 hours  dextrose 5%. 1000 milliLiter(s) IV Continuous <Continuous>  dextrose 5%. 1000 milliLiter(s) IV Continuous <Continuous>  potassium iodide 10%/iodine 5% Oral Liquid - Peds 0.25 milliLiter(s) Oral every 6 hours  sodium chloride 0.9% lock flush 10 milliLiter(s) IV Push every 1 hour PRN  chlorhexidine 0.12% Liquid 15 milliLiter(s) Oral Mucosa every 12 hours    Mode: AC/ CMV (Assist Control/ Continuous Mandatory Ventilation)  RR (machine): 24  TV (machine): 400  FiO2: 100  PEEP: 8  MAP: 14  PIP: 41      ICU Vital Signs Last 24 Hrs  T(C): 38.2 (15 Nov 2020 08:00), Max: 38.2 (15 Nov 2020 08:00)  T(F): 100.8 (15 Nov 2020 08:00), Max: 100.8 (15 Nov 2020 08:00)  HR: 70 (15 Nov 2020 11:15) (60 - 156)  BP: 106/62 (15 Nov 2020 11:15) (81/52 - 180/96)  BP(mean): 78 (15 Nov 2020 11:15) (62 - 131)  RR: 25 (15 Nov 2020 11:15) (18 - 41)  SpO2: 100% (15 Nov 2020 11:15) (88% - 100%)      ABG - ( 15 Nov 2020 09:24 )  pH, Arterial: 7.25  pH, Blood: x     /  pCO2: 40    /  pO2: 78    / HCO3: 17    / Base Excess: -9.2  /  SaO2: 92        I&O's Detail    2020 07:01  -  15 Nov 2020 07:00  --------------------------------------------------------  IN:    Lactated Ringers: 1200 mL  Total IN: 1200 mL    OUT:    Indwelling Catheter - Urethral (mL): 200 mL    Voided (mL): 250 mL  Total OUT: 450 mL    Total NET: 750 mL      15 Nov 2020 07:  -  15 Nov 2020 12:42  --------------------------------------------------------  IN:    IV PiggyBack: 200 mL    Lactated Ringers Bolus: 2000 mL  Total IN: 2200 mL    OUT:    Indwelling Catheter - Urethral (mL): 130 mL  Total OUT: 130 mL    Total NET: 2070 mL    LABS:                        11.0   9.17  )-----------( 225      ( 15 Nov 2020 06:16 )             34.7     11-15    148<H>  |  113<H>  |  27.0<H>  ----------------------------<  221<H>  4.2   |  16.0<L>  |  0.68    Ca    10.5<H>      15 Nov 2020 06:16  Phos  4.2     11-15  Mg     1.5     11-15    TPro  8.3  /  Alb  4.1  /  TBili  0.9  /  DBili  x   /  AST  30  /  ALT  22  /  AlkPhos  96  11-14      CARDIAC MARKERS ( 15 Nov 2020 06:16 )  x     / 0.17 ng/mL / x     / x     / x      CARDIAC MARKERS ( 2020 21:30 )  x     / 0.16 ng/mL / 756 U/L / x     / 15.8 ng/mL      CAPILLARY BLOOD GLUCOSE      POCT Blood Glucose.: 177 mg/dL (15 Nov 2020 07:40)    PT/INR - ( 2020 21:30 )   PT: 16.3 sec;   INR: 1.43 ratio         PTT - ( 2020 21:30 )  PTT:32.4 sec  Urinalysis Basic - ( 2020 22:05 )    Color: Yellow / Appearance: Clear / S.020 / pH: x  Gluc: x / Ketone: Moderate  / Bili: Negative / Urobili: Negative mg/dL   Blood: x / Protein: 100 mg/dL / Nitrite: Negative   Leuk Esterase: Trace / RBC: 3-5 /HPF / WBC 0-2   Sq Epi: x / Non Sq Epi: Moderate / Bacteria: x    CULTURES:    Physical Examination:    General: Thin, weak, elderly    HEENT: Pupils equal, cataracts present    PULM: good air movement, rales, b/l     CVS: Regular rate and rhythm, no murmurs, rubs, or gallops    ABD: Soft, nondistended, nontender, normoactive bowel sounds, no masses    EXT: No edema, nontender    SKIN: Warm and well perfused, no rashes noted.    NEURO: A&Ox1, 2/5 str in all extremities, right does seem weaker than left      RADIOLOGY: CXR new b/l infiltrates R>L      CRITICAL CARE TIME SPENT: 73 minutes assessing presenting problems of acute illness, which pose high probability of life threatening deterioration or end organ damage/dysfunction, as well as medical decision making including initiating plan of care, reviewing data, reviewing radiologic exams, discussing with multidisciplinary team,  discussing goals of care with patient/family, and writing this note.  Non-inclusive of procedures performed,      Patient is a 80y old  Female who presents with a chief complaint of AMS (2020 23:26)      BRIEF HOSPITAL COURSE: 80 year old female with a pmhx of PPM, DM, HTN, hyperthyroid who presents with AMS, frequent falls, and weakness. In the ER found to have SVT and undetectable TSH. Thought be in thyroid storm was given beta blocker, steroids, and methimazole, admitted to MICU.     Events last 24 hours: Early this morning patient became acutely hypoxic and tachypneic after eating pudding. She was placed on HFNC and quickly required 100% FiO2. Despite this is remained in acute distress. Barbi her daughter was called and informed of her change in status. She confirms full code status.  Also noted to be more weak with what appears to be a right sided facial droop. She was emergently intubated.     PAST MEDICAL & SURGICAL HISTORY:  HTN (hypertension)    DM (diabetes mellitus)    Pacemaker        Review of Systems:  unable to obtain due to clinical condition       Medications:  piperacillin/tazobactam IVPB.. 3.375 Gram(s) IV Intermittent every 8 hours  norepinephrine Infusion 0.3 MICROgram(s)/kG/Min IV Continuous <Continuous>  acetylcysteine 10%  Inhalation 4 milliLiter(s) Inhalation every 6 hours  albuterol/ipratropium for Nebulization. 3 milliLiter(s) Nebulizer every 6 hours  acetaminophen   Tablet .. 650 milliGRAM(s) Oral every 6 hours PRN  fentaNYL    Injectable 25 MICROGram(s) IV Push every 2 hours PRN  enoxaparin Injectable 30 milliGRAM(s) SubCutaneous daily  pantoprazole    Tablet 40 milliGRAM(s) Oral before breakfast  dextrose 40% Gel 15 Gram(s) Oral once  dextrose 50% Injectable 25 Gram(s) IV Push once  dextrose 50% Injectable 12.5 Gram(s) IV Push once  dextrose 50% Injectable 25 Gram(s) IV Push once  glucagon  Injectable 1 milliGRAM(s) IntraMuscular once  hydrocortisone sodium succinate Injectable 100 milliGRAM(s) IV Push every 8 hours  insulin lispro (ADMELOG) corrective regimen sliding scale   SubCutaneous every 6 hours  methimazole 20 milliGRAM(s) Oral every 8 hours  dextrose 5%. 1000 milliLiter(s) IV Continuous <Continuous>  dextrose 5%. 1000 milliLiter(s) IV Continuous <Continuous>  potassium iodide 10%/iodine 5% Oral Liquid - Peds 0.25 milliLiter(s) Oral every 6 hours  sodium chloride 0.9% lock flush 10 milliLiter(s) IV Push every 1 hour PRN  chlorhexidine 0.12% Liquid 15 milliLiter(s) Oral Mucosa every 12 hours    Mode: AC/ CMV (Assist Control/ Continuous Mandatory Ventilation)  RR (machine): 24  TV (machine): 400  FiO2: 100  PEEP: 8  MAP: 14  PIP: 41      ICU Vital Signs Last 24 Hrs  T(C): 38.2 (15 Nov 2020 08:00), Max: 38.2 (15 Nov 2020 08:00)  T(F): 100.8 (15 Nov 2020 08:00), Max: 100.8 (15 Nov 2020 08:00)  HR: 70 (15 Nov 2020 11:15) (60 - 156)  BP: 106/62 (15 Nov 2020 11:15) (81/52 - 180/96)  BP(mean): 78 (15 Nov 2020 11:15) (62 - 131)  RR: 25 (15 Nov 2020 11:15) (18 - 41)  SpO2: 100% (15 Nov 2020 11:15) (88% - 100%)      ABG - ( 15 Nov 2020 09:24 )  pH, Arterial: 7.25  pH, Blood: x     /  pCO2: 40    /  pO2: 78    / HCO3: 17    / Base Excess: -9.2  /  SaO2: 92        I&O's Detail    2020 07:01  -  15 Nov 2020 07:00  --------------------------------------------------------  IN:    Lactated Ringers: 1200 mL  Total IN: 1200 mL    OUT:    Indwelling Catheter - Urethral (mL): 200 mL    Voided (mL): 250 mL  Total OUT: 450 mL    Total NET: 750 mL      15 Nov 2020 07:  -  15 Nov 2020 12:42  --------------------------------------------------------  IN:    IV PiggyBack: 200 mL    Lactated Ringers Bolus: 2000 mL  Total IN: 2200 mL    OUT:    Indwelling Catheter - Urethral (mL): 130 mL  Total OUT: 130 mL    Total NET: 2070 mL    LABS:                        11.0   9.17  )-----------( 225      ( 15 Nov 2020 06:16 )             34.7     11-15    148<H>  |  113<H>  |  27.0<H>  ----------------------------<  221<H>  4.2   |  16.0<L>  |  0.68    Ca    10.5<H>      15 Nov 2020 06:16  Phos  4.2     11-15  Mg     1.5     11-15    TPro  8.3  /  Alb  4.1  /  TBili  0.9  /  DBili  x   /  AST  30  /  ALT  22  /  AlkPhos  96  11-14      CARDIAC MARKERS ( 15 Nov 2020 06:16 )  x     / 0.17 ng/mL / x     / x     / x      CARDIAC MARKERS ( 2020 21:30 )  x     / 0.16 ng/mL / 756 U/L / x     / 15.8 ng/mL      CAPILLARY BLOOD GLUCOSE      POCT Blood Glucose.: 177 mg/dL (15 Nov 2020 07:40)    PT/INR - ( 2020 21:30 )   PT: 16.3 sec;   INR: 1.43 ratio         PTT - ( 2020 21:30 )  PTT:32.4 sec  Urinalysis Basic - ( 2020 22:05 )    Color: Yellow / Appearance: Clear / S.020 / pH: x  Gluc: x / Ketone: Moderate  / Bili: Negative / Urobili: Negative mg/dL   Blood: x / Protein: 100 mg/dL / Nitrite: Negative   Leuk Esterase: Trace / RBC: 3-5 /HPF / WBC 0-2   Sq Epi: x / Non Sq Epi: Moderate / Bacteria: x    CULTURES:    Physical Examination:    General: Thin, weak, elderly    HEENT: Pupils equal, cataracts present    PULM: good air movement, rales, b/l     CVS: Regular rate and rhythm, no murmurs, rubs, or gallops    ABD: Soft, nondistended, nontender, normoactive bowel sounds, no masses    EXT: No edema, nontender    SKIN: Warm and well perfused, no rashes noted.    NEURO: A&Ox1, 2/5 str in all extremities, right does seem weaker than left, right sided facial droop      RADIOLOGY: CXR new b/l infiltrates R>L      CRITICAL CARE TIME SPENT: 73 minutes assessing presenting problems of acute illness, which pose high probability of life threatening deterioration or end organ damage/dysfunction, as well as medical decision making including initiating plan of care, reviewing data, reviewing radiologic exams, discussing with multidisciplinary team,  discussing goals of care with patient/family, and writing this note.  Non-inclusive of procedures performed,

## 2020-11-15 NOTE — PROVIDER CONTACT NOTE (EICU) - RECOMMENDATIONS
Plan  1. Please keep preload up to prevent cardiac collapsed, giuliana her heart rate went up to 150s  2. 2L LR, keep 200cc/hr also for treatment of hypercalcemia  3. NPO for now  4. Urgent endocrine consult  5. Agree with Propanolol, hydrocortisone 100mg Q8, methimazole 20mg Q4 for now, awaiting for endocrine input  6. Trend Ca, CPK and Trop Q6

## 2020-11-15 NOTE — PROCEDURE NOTE - NSTRACHPOSTINTU_RESP_A_CORE
Chest X-Ray/Appropriate capnography/Breath sounds bilateral/Breath sounds equal/Positive end tidal Co2 noted

## 2020-11-16 NOTE — CONSULT NOTE ADULT - ASSESSMENT
This 80 year old female with a pmhx of PPM, DM, HTN, hyperthyroid who presents with AMS. Per daughter she brought patient in as she has been having frequent falls at home. Over the past few days she noticed patient becoming more confused. Patient had fall (11/13/2020) and was unable to get up and was then brought in to Ed. Upon arrival noted to be SVT, HTN. TSH undetectable and had high t3/4. MICU then consulted.  (14 Nov 2020 23:26)    he was found with acute respiratory failure with hypoxia. Per team, aspiration event noted as well.   Images showed  L perihilar area with patchy infiltrates > R perihilar.  empiric antibiotics were started.     patient remains intubated under MICU care.  Thyroid storm also being managed by the ICU    Impression:  - acute respiratory failure  - lung infiltrates  - thyroid storm  - WBC elevation  - acute renal failure    Plan:  - continue empiric antibiotics for now  - add Zithromax to cover atypical organisms  - check procalcitonin tomorrow    Serum BNP markedly elevated  - consider cardiology input  - Serum Pro-Brain Natriuretic Peptide: 65908 pg/mL (11-15-20 @ 06:16)  - Serum Pro-Brain Natriuretic Peptide: 73905 pg/mL (11-14-20 @ 21:30)  - Serum Pro-Brain Natriuretic Peptide: 117 pg/mL (10-23-17 @ 22:57)    Acute renal failure  - will watch closely    - follow up all outstanding cultures  - trend temperature and WBC curve  - repeat cultures from blood and all sources if febrile.

## 2020-11-16 NOTE — PROGRESS NOTE ADULT - PROBLEM SELECTOR PLAN 4
sepsis  check echocardiogram to evaluate LVEF  continue norepinephrine infusion to optimize perfusion

## 2020-11-16 NOTE — CONSULT NOTE ADULT - ASSESSMENT
1. Thyroid storm, likely precipitated by poor adherence to methimazole and infection.  High risk for mortality. Agree with use of hydrocortisone and SSKI. PTU generally preferred over methimazole due to additional inhibition of T4 to T3 conversion; suggest switching form methimazole to  mg tid, especially as pt. not on B-blockade due to hypotension.  Pt. is at risk of DVT or arterial thrombosis due to irregular cardiac rhythm and hyperthyroidism; evaluate anticoagulation risks  2. DM type 2, acute loss of control due to steroids and overwhelming illness. Agree with IV insulin use.    Will follow

## 2020-11-16 NOTE — PROGRESS NOTE ADULT - SUBJECTIVE AND OBJECTIVE BOX
Patient is a 80y old  Female who presents with a chief complaint of AMS (15 Nov 2020 12:41)      BRIEF HOSPITAL COURSE: 81 yo admitted  with thyroid storm, had aspiration event 11/15 requiring intubation    Events last 24 hours: developed shock, transaminitis, renal failure    PAST MEDICAL & SURGICAL HISTORY:  HTN (hypertension)    DM (diabetes mellitus)    Pacemaker          Medications:  piperacillin/tazobactam IVPB.. 3.375 Gram(s) IV Intermittent every 8 hours    norepinephrine Infusion 0.3 MICROgram(s)/kG/Min IV Continuous <Continuous>    albuterol/ipratropium for Nebulization. 3 milliLiter(s) Nebulizer every 6 hours    acetaminophen   Tablet .. 650 milliGRAM(s) Oral every 6 hours PRN  fentaNYL    Injectable 25 MICROGram(s) IV Push every 2 hours PRN      enoxaparin Injectable 30 milliGRAM(s) SubCutaneous daily    pantoprazole    Tablet 40 milliGRAM(s) Oral before breakfast      dextrose 50% Injectable 50 milliLiter(s) IV Push every 15 minutes  glucagon  Injectable 1 milliGRAM(s) IntraMuscular once  hydrocortisone sodium succinate Injectable 50 milliGRAM(s) IV Push every 6 hours  insulin lispro (ADMELOG) corrective regimen sliding scale   SubCutaneous every 6 hours  insulin regular Infusion 4 Unit(s)/Hr IV Continuous <Continuous>  methimazole 20 milliGRAM(s) Oral every 8 hours    potassium iodide 10%/iodine 5% Oral Liquid - Peds 0.25 milliLiter(s) Oral every 6 hours  sodium chloride 0.9% lock flush 10 milliLiter(s) IV Push every 1 hour PRN      chlorhexidine 0.12% Liquid 15 milliLiter(s) Oral Mucosa every 12 hours        Mode: AC/ CMV (Assist Control/ Continuous Mandatory Ventilation)  RR (machine): 26  TV (machine): 380  FiO2: 40  PEEP: 8  MAP: 15  PIP: 34      ICU Vital Signs Last 24 Hrs  T(C): 38.7 (2020 08:00), Max: 39 (15 Nov 2020 15:00)  T(F): 101.7 (2020 08:00), Max: 102.2 (15 Nov 2020 15:00)  HR: 60 (2020 08:15) (59 - 87)  BP: 91/53 (2020 08:15) (81/52 - 144/67)  BP(mean): 69 (2020 08:15) (62 - 99)  ABP: --  ABP(mean): --  RR: 26 (2020 08:15) (20 - 39)  SpO2: 96% (2020 08:15) (88% - 100%)      ABG - ( 15 Nov 2020 13:51 )  pH, Arterial: 7.27  pH, Blood: x     /  pCO2: 39    /  pO2: 130   / HCO3: 18    / Base Excess: -8.2  /  SaO2: 99                  I&O's Detail    15 Nov 2020 07:01  -  2020 07:00  --------------------------------------------------------  IN:    dextrose 5% w/ Additives: 1300 mL    Enteral Tube Flush: 195 mL    Glucerna: 410 mL    IV PiggyBack: 500 mL    Lactated Ringers Bolus: 2000 mL    multiple electrolytes Injection Type 1 Bolus: 2000 mL    Norepinephrine: 548 mL  Total IN: 6953 mL    OUT:    Indwelling Catheter - Urethral (mL): 635 mL  Total OUT: 635 mL    Total NET: 6318 mL      2020 07:01  -  2020 08:46  --------------------------------------------------------  IN:    Glucerna: 40 mL    Norepinephrine: 5 mL  Total IN: 45 mL    OUT:    Indwelling Catheter - Urethral (mL): 10 mL  Total OUT: 10 mL    Total NET: 35 mL            LABS:                        8.9    11.72 )-----------( 97       ( 2020 04:33 )             29.1     11-16    145  |  106  |  59.0<H>  ----------------------------<  375<H>  4.5   |  21.0<L>  |  1.93<H>    Ca    8.9      2020 04:33  Phos  6.0     11-  Mg     3.0     -16    TPro  5.1<L>  /  Alb  2.5<L>  /  TBili  2.1<H>  /  DBili  x   /  AST  4526<H>  /  ALT  1230<H>  /  AlkPhos  76  11-16      CARDIAC MARKERS ( 15 Nov 2020 06:16 )  x     / 0.17 ng/mL / x     / x     / x      CARDIAC MARKERS ( 2020 21:30 )  x     / 0.16 ng/mL / 756 U/L / x     / 15.8 ng/mL      CAPILLARY BLOOD GLUCOSE      POCT Blood Glucose.: 347 mg/dL (2020 05:10)    PT/INR - ( 2020 21:30 )   PT: 16.3 sec;   INR: 1.43 ratio         PTT - ( 2020 21:30 )  PTT:32.4 sec  Urinalysis Basic - ( 2020 22:05 )    Color: Yellow / Appearance: Clear / S.020 / pH: x  Gluc: x / Ketone: Moderate  / Bili: Negative / Urobili: Negative mg/dL   Blood: x / Protein: 100 mg/dL / Nitrite: Negative   Leuk Esterase: Trace / RBC: 3-5 /HPF / WBC 0-2   Sq Epi: x / Non Sq Epi: Moderate / Bacteria: x      CULTURES:      Physical Examination:    General: No acute distress.      HEENT: Pupils equal, reactive to light.  Symmetric.    PULM: Clear to auscultation bilaterally, no significant sputum production    NECK: Supple, no lymphadenopathy, trachea midline    CVS: Regular rate and rhythm, no murmurs, rubs, or gallops    ABD: Soft, nondistended, nontender, normoactive bowel sounds, no masses    EXT: No edema, nontender    SKIN: Warm and well perfused, no rashes noted.    NEURO: Alert, oriented, interactive, nonfocal    DEVICES:     RADIOLOGY: ***    CRITICAL CARE TIME SPENT: ***   Patient is a 80y old  Female who presents with a chief complaint of AMS (15 Nov 2020 12:41)      BRIEF HOSPITAL COURSE: 79 yo admitted  with thyroid storm, had aspiration event 11/15 requiring intubation    Events last 24 hours: developed shock, transaminitis, renal failure    PAST MEDICAL & SURGICAL HISTORY:  HTN (hypertension)    DM (diabetes mellitus)    Pacemaker          Medications:  piperacillin/tazobactam IVPB.. 3.375 Gram(s) IV Intermittent every 8 hours    norepinephrine Infusion 0.3 MICROgram(s)/kG/Min IV Continuous <Continuous>    albuterol/ipratropium for Nebulization. 3 milliLiter(s) Nebulizer every 6 hours    acetaminophen   Tablet .. 650 milliGRAM(s) Oral every 6 hours PRN  fentaNYL    Injectable 25 MICROGram(s) IV Push every 2 hours PRN      enoxaparin Injectable 30 milliGRAM(s) SubCutaneous daily    pantoprazole    Tablet 40 milliGRAM(s) Oral before breakfast      dextrose 50% Injectable 50 milliLiter(s) IV Push every 15 minutes  glucagon  Injectable 1 milliGRAM(s) IntraMuscular once  hydrocortisone sodium succinate Injectable 50 milliGRAM(s) IV Push every 6 hours  insulin lispro (ADMELOG) corrective regimen sliding scale   SubCutaneous every 6 hours  insulin regular Infusion 4 Unit(s)/Hr IV Continuous <Continuous>  methimazole 20 milliGRAM(s) Oral every 8 hours    potassium iodide 10%/iodine 5% Oral Liquid - Peds 0.25 milliLiter(s) Oral every 6 hours  sodium chloride 0.9% lock flush 10 milliLiter(s) IV Push every 1 hour PRN      chlorhexidine 0.12% Liquid 15 milliLiter(s) Oral Mucosa every 12 hours        Mode: AC/ CMV (Assist Control/ Continuous Mandatory Ventilation)  RR (machine): 26  TV (machine): 380  FiO2: 40  PEEP: 8  MAP: 15  PIP: 34      ICU Vital Signs Last 24 Hrs  T(C): 38.7 (2020 08:00), Max: 39 (15 Nov 2020 15:00)  T(F): 101.7 (2020 08:00), Max: 102.2 (15 Nov 2020 15:00)  HR: 60 (2020 08:15) (59 - 87)  BP: 91/53 (2020 08:15) (81/52 - 144/67)  BP(mean): 69 (2020 08:15) (62 - 99)  ABP: --  ABP(mean): --  RR: 26 (2020 08:15) (20 - 39)  SpO2: 96% (2020 08:15) (88% - 100%)      ABG - ( 15 Nov 2020 13:51 )  pH, Arterial: 7.27  pH, Blood: x     /  pCO2: 39    /  pO2: 130   / HCO3: 18    / Base Excess: -8.2  /  SaO2: 99                  I&O's Detail    15 Nov 2020 07:01  -  2020 07:00  --------------------------------------------------------  IN:    dextrose 5% w/ Additives: 1300 mL    Enteral Tube Flush: 195 mL    Glucerna: 410 mL    IV PiggyBack: 500 mL    Lactated Ringers Bolus: 2000 mL    multiple electrolytes Injection Type 1 Bolus: 2000 mL    Norepinephrine: 548 mL  Total IN: 6953 mL    OUT:    Indwelling Catheter - Urethral (mL): 635 mL  Total OUT: 635 mL    Total NET: 6318 mL      2020 07:01  -  2020 08:46  --------------------------------------------------------  IN:    Glucerna: 40 mL    Norepinephrine: 5 mL  Total IN: 45 mL    OUT:    Indwelling Catheter - Urethral (mL): 10 mL  Total OUT: 10 mL    Total NET: 35 mL            LABS:                        8.9    11.72 )-----------( 97       ( 2020 04:33 )             29.1     11-16    145  |  106  |  59.0<H>  ----------------------------<  375<H>  4.5   |  21.0<L>  |  1.93<H>    Ca    8.9      2020 04:33  Phos  6.0     11-  Mg     3.0     -16    TPro  5.1<L>  /  Alb  2.5<L>  /  TBili  2.1<H>  /  DBili  x   /  AST  4526<H>  /  ALT  1230<H>  /  AlkPhos  76  11-16      CARDIAC MARKERS ( 15 Nov 2020 06:16 )  x     / 0.17 ng/mL / x     / x     / x      CARDIAC MARKERS ( 2020 21:30 )  x     / 0.16 ng/mL / 756 U/L / x     / 15.8 ng/mL      CAPILLARY BLOOD GLUCOSE      POCT Blood Glucose.: 347 mg/dL (2020 05:10)    PT/INR - ( 2020 21:30 )   PT: 16.3 sec;   INR: 1.43 ratio         PTT - ( 2020 21:30 )  PTT:32.4 sec  Urinalysis Basic - ( 2020 22:05 )    Color: Yellow / Appearance: Clear / S.020 / pH: x  Gluc: x / Ketone: Moderate  / Bili: Negative / Urobili: Negative mg/dL   Blood: x / Protein: 100 mg/dL / Nitrite: Negative   Leuk Esterase: Trace / RBC: 3-5 /HPF / WBC 0-2   Sq Epi: x / Non Sq Epi: Moderate / Bacteria: x      CULTURES:      Physical Examination:    General: No acute distress.      HEENT: Pupils equal, sluggish to light.  Symmetric.  Cataracts visible    PULM: Clear to auscultation bilaterally, no significant sputum production.  No wheezing    NECK: Supple, no lymphadenopathy, trachea midline    CVS: Regular rate and rhythm, no murmurs, rubs, or gallops    ABD: Soft, nondistended, nontender, normoactive bowel sounds, no masses    EXT: No edema, nontender    SKIN: Warm and well perfused, no rashes noted.    NEURO: no response    DEVICES: RIJ CVC: shock.  wiggins: vandana    RADIOLOGY: reviewed    CRITICAL CARE TIME SPENT: 40

## 2020-11-16 NOTE — CONSULT NOTE ADULT - SUBJECTIVE AND OBJECTIVE BOX
[facility]  INFECTIOUS DISEASES AND INTERNAL MEDICINE at [place]  =======================================================  Willam Luu MD  Diplomates American Board of Internal Medicine and Infectious Diseases  Tel  [phone]  Fax [phone]  =======================================================    [facility]-[id number]  SAULO LEROY   HPI:  This 80 year old female with a pmhx of PPM, DM, HTN, hyperthyroid who presents with AMS. Per daughter she brought patient in as she has been having frequent falls at home. Over the past few days she noticed patient becoming more confused. Patient had fall (11/13/2020) and was unable to get up and was then brought in to Ed. Upon arrival noted to be SVT, HTN. TSH undetectable and had high t3/4. MICU then consulted.  (14 Nov 2020 23:26)    he was found with acute respiratory failure with hypoxia. Per team, aspiration event noted as well.   Images showed  L perihilar area with patchy infiltrates > R perihilar.  empiric antibiotics were started.     patient remains intubated under MICU care.  Thyroid storm also being managed by the ICU    I have personally reviewed the labs and data; pertinent labs and data are listed in this note; please see below.   =======================================================  Past Medical & Surgical Hx:  =====================  PAST MEDICAL & SURGICAL HISTORY:  HTN (hypertension)    DM (diabetes mellitus)    Pacemaker      Problem List:  ==========  HEALTH ISSUES - PROBLEM Dx:  Encephalopathy acute  Hyperglycemia  Acute renal failure with tubular necrosis   Transaminitis  Shock   Thyroid storm  Pneumonia, aspiration  Acute respiratory failure with hypoxia          Social Hx:  =======  no toxic habits currently    FAMILY HISTORY:  no significant family history of immunosuppressive disorders in mother or father   =======================================================    REVIEW OF SYSTEMS:  CONSTITUTIONAL:  No Fever or chills  HEENT:  No diplopia or blurred vision.  No earache, sore throat or runny nose.  CARDIOVASCULAR:  No pressure, squeezing, strangling, tightness, heaviness or aching about the chest, neck, axilla or epigastrium.  RESPIRATORY:  No cough, shortness of breath  GASTROINTESTINAL:  No nausea, vomiting or diarrhea.  GENITOURINARY:  No dysuria, frequency or urgency. No Blood in urine  MUSCULOSKELETAL:  no joint aches, no muscle pain  SKIN:  No change in skin, hair or nails.  NEUROLOGIC:  No Headaches, seizures or weakness.  PSYCHIATRIC:  No disorder of thought or mood.  ENDOCRINE:  No heat or cold intolerance  HEMATOLOGICAL:  No easy bruising or bleeding.    =======================================================  Allergies  No Known Allergies      Antibiotics:   azithromycin  IVPB 500 milliGRAM(s) IV Intermittent once  piperacillin/tazobactam IVPB.. 3.375 Gram(s) IV Intermittent every 12 hours    Other medications:  chlorhexidine 0.12% Liquid 15 milliLiter(s) Oral Mucosa every 12 hours  dextrose 50% Injectable 50 milliLiter(s) IV Push every 15 minutes  glucagon  Injectable 1 milliGRAM(s) IntraMuscular once  heparin   Injectable 5000 Unit(s) SubCutaneous every 12 hours  hydrocortisone sodium succinate Injectable 50 milliGRAM(s) IV Push every 6 hours  insulin regular Infusion 4 Unit(s)/Hr IV Continuous <Continuous>  methimazole 20 milliGRAM(s) Oral every 8 hours  norepinephrine Infusion 0.3 MICROgram(s)/kG/Min IV Continuous <Continuous>  pantoprazole    Tablet 40 milliGRAM(s) Oral before breakfast  potassium iodide 10%/iodine 5% Oral Liquid - Peds 0.25 milliLiter(s) Oral every 6 hours      cefTRIAXone   IVPB   100 mL/Hr IV Intermittent (11-14-20 @ 22:26)    piperacillin/tazobactam IVPB.   200 mL/Hr IV Intermittent (11-15-20 @ 10:41)   25 mL/Hr IV Intermittent (11-15-20 @ 13:34)   25 mL/Hr IV Intermittent (11-15-20 @ 21:03)   25 mL/Hr IV Intermittent (11-16-20 @ 05:06)    ======================================================  Physical Exam:  ============  T(F): 101.7 (16 Nov 2020 08:00), Max: 102.2 (15 Nov 2020 15:00)  HR: 109 (16 Nov 2020 11:45)  BP: 101/57 (16 Nov 2020 11:45)  RR: 27 (16 Nov 2020 11:45)  SpO2: 97% (16 Nov 2020 11:45) (80% - 100%)  temp max in last 48H T(F): , Max: 102.2 (11-15-20 @ 15:00)    General:  THIN FRAIL, sedated  Eye: Pupils are equal, round and reactive to light,  BILATERAL pterygium,  right WORSE THAN LEFT;  bilateral cataracts  HENT: Normocephalic, Oral mucosa is dry  ET Tube in place  Neck: Supple, No lymphadenopathy.  Respiratory: Lungs with fair air entry  Cardiovascular: Normal rate, Regular rhythm,   Gastrointestinal: Soft,  Non-distended, Normal bowel sounds.  Genitourinary: No costovertebral angle tenderness.  Lymphatics: No lymphadenopathy neck,   Musculoskeletal: no joint abnl  Integumentary: No rash.  Neurologic: sedated, unable to assess    =======================================================  Labs:                        8.9    11.72 )-----------( 97       ( 16 Nov 2020 04:33 )             29.1      11-16    145  |  106  |  59.0<H>  ----------------------------<  375<H>  4.5   |  21.0<L>  |  1.93<H>    Ca    8.9      16 Nov 2020 04:33  Phos  6.0     11-16  Mg     3.0     11-16    TPro  5.1<L>  /  Alb  2.5<L>  /  TBili  2.1<H>  /  DBili  x   /  AST  4526<H>  /  ALT  1230<H>  /  AlkPhos  76  11-16      Creatinine, Serum: 1.93 mg/dL (11-16-20 @ 04:33)  Creatinine, Serum: 1.24 mg/dL (11-15-20 @ 14:09)  Creatinine, Serum: 0.68 mg/dL (11-15-20 @ 06:16)  Creatinine, Serum: 0.74 mg/dL (11-14-20 @ 21:30)    COVID-19 PCR: NotDetec (11-14-20 @ 22:00)        < from: Xray Chest 1 View- PORTABLE-Urgent (Xray Chest 1 View- PORTABLE-Urgent .) (11.15.20 @ 13:48) >     EXAM:  XR CHEST PORTABLE URGENT 1V                          PROCEDURE DATE:  11/15/2020        INTERPRETATION:  Clinical Information: RIJ central line. Respiratory failure    Technique: AP chest image.    Comparison: 11/15/2020    Findings:  Impression: ETT tip above kevin. NG tip below diaphragm. Right IJ catheter tip overlies the SVC. Left pacemaker. Large perihilar patchy opacification representing pneumonia. No pneumothorax    ANYA VÁZQUEZ MD; Attending Interventional Radiologist  This document has been electronically signed. Nov 15 2020  4:21PM    < end of copied text >   [facility]  INFECTIOUS DISEASES AND INTERNAL MEDICINE at [place]  =======================================================  Willam Luu MD  Diplomates American Board of Internal Medicine and Infectious Diseases  Tel  [phone]  Fax [phone]  =======================================================    [facility]-[id number]  SAULO LEROY   HPI:  This 80 year old female with a pmhx of PPM, DM, HTN, hyperthyroid who presents with AMS. Per daughter she brought patient in as she has been having frequent falls at home. Over the past few days she noticed patient becoming more confused. Patient had fall (11/13/2020) and was unable to get up and was then brought in to Ed. Upon arrival noted to be SVT, HTN. TSH undetectable and had high t3/4. MICU then consulted.  (14 Nov 2020 23:26)    he was found with acute respiratory failure with hypoxia. Per team, aspiration event noted as well.   Images showed  L perihilar area with patchy infiltrates > R perihilar.  empiric antibiotics were started.     patient remains intubated under MICU care.  Thyroid storm also being managed by the ICU    I have personally reviewed the labs and data; pertinent labs and data are listed in this note; please see below.   =======================================================  Past Medical & Surgical Hx:  =====================  PAST MEDICAL & SURGICAL HISTORY:  HTN (hypertension)    DM (diabetes mellitus)    Pacemaker      Problem List:  ==========  HEALTH ISSUES - PROBLEM Dx:  Encephalopathy acute  Hyperglycemia  Acute renal failure with tubular necrosis   Transaminitis  Shock   Thyroid storm  Pneumonia, aspiration  Acute respiratory failure with hypoxia          Social Hx:  =======  no toxic habits currently    FAMILY HISTORY:  no significant family history of immunosuppressive disorders in mother or father   =======================================================    REVIEW OF SYSTEMS:  Limited due to medical condition    =======================================================  Allergies  No Known Allergies      Antibiotics:   azithromycin  IVPB 500 milliGRAM(s) IV Intermittent once  piperacillin/tazobactam IVPB.. 3.375 Gram(s) IV Intermittent every 12 hours    Other medications:  chlorhexidine 0.12% Liquid 15 milliLiter(s) Oral Mucosa every 12 hours  dextrose 50% Injectable 50 milliLiter(s) IV Push every 15 minutes  glucagon  Injectable 1 milliGRAM(s) IntraMuscular once  heparin   Injectable 5000 Unit(s) SubCutaneous every 12 hours  hydrocortisone sodium succinate Injectable 50 milliGRAM(s) IV Push every 6 hours  insulin regular Infusion 4 Unit(s)/Hr IV Continuous <Continuous>  methimazole 20 milliGRAM(s) Oral every 8 hours  norepinephrine Infusion 0.3 MICROgram(s)/kG/Min IV Continuous <Continuous>  pantoprazole    Tablet 40 milliGRAM(s) Oral before breakfast  potassium iodide 10%/iodine 5% Oral Liquid - Peds 0.25 milliLiter(s) Oral every 6 hours      cefTRIAXone   IVPB   100 mL/Hr IV Intermittent (11-14-20 @ 22:26)    piperacillin/tazobactam IVPB.   200 mL/Hr IV Intermittent (11-15-20 @ 10:41)   25 mL/Hr IV Intermittent (11-15-20 @ 13:34)   25 mL/Hr IV Intermittent (11-15-20 @ 21:03)   25 mL/Hr IV Intermittent (11-16-20 @ 05:06)    ======================================================  Physical Exam:  ============  T(F): 101.7 (16 Nov 2020 08:00), Max: 102.2 (15 Nov 2020 15:00)  HR: 109 (16 Nov 2020 11:45)  BP: 101/57 (16 Nov 2020 11:45)  RR: 27 (16 Nov 2020 11:45)  SpO2: 97% (16 Nov 2020 11:45) (80% - 100%)  temp max in last 48H T(F): , Max: 102.2 (11-15-20 @ 15:00)    General:  THIN FRAIL, sedated  Eye: Pupils are equal, round and reactive to light,  BILATERAL pterygium,  right WORSE THAN LEFT;  bilateral cataracts  HENT: Normocephalic, Oral mucosa is dry  ET Tube in place  Neck: Supple, No lymphadenopathy.  Respiratory: Lungs with fair air entry  Cardiovascular: Normal rate, Regular rhythm,   Gastrointestinal: Soft,  Non-distended, Normal bowel sounds.  Genitourinary: No costovertebral angle tenderness.  Lymphatics: No lymphadenopathy neck,   Musculoskeletal: no joint abnl  Integumentary: No rash.  Neurologic: sedated, unable to assess    =======================================================  Labs:                        8.9    11.72 )-----------( 97       ( 16 Nov 2020 04:33 )             29.1      11-16    145  |  106  |  59.0<H>  ----------------------------<  375<H>  4.5   |  21.0<L>  |  1.93<H>    Ca    8.9      16 Nov 2020 04:33  Phos  6.0     11-16  Mg     3.0     11-16    TPro  5.1<L>  /  Alb  2.5<L>  /  TBili  2.1<H>  /  DBili  x   /  AST  4526<H>  /  ALT  1230<H>  /  AlkPhos  76  11-16      Creatinine, Serum: 1.93 mg/dL (11-16-20 @ 04:33)  Creatinine, Serum: 1.24 mg/dL (11-15-20 @ 14:09)  Creatinine, Serum: 0.68 mg/dL (11-15-20 @ 06:16)  Creatinine, Serum: 0.74 mg/dL (11-14-20 @ 21:30)    COVID-19 PCR: NotDetec (11-14-20 @ 22:00)        < from: Xray Chest 1 View- PORTABLE-Urgent (Xray Chest 1 View- PORTABLE-Urgent .) (11.15.20 @ 13:48) >     EXAM:  XR CHEST PORTABLE URGENT 1V                          PROCEDURE DATE:  11/15/2020        INTERPRETATION:  Clinical Information: RIJ central line. Respiratory failure    Technique: AP chest image.    Comparison: 11/15/2020    Findings:  Impression: ETT tip above kevin. NG tip below diaphragm. Right IJ catheter tip overlies the SVC. Left pacemaker. Large perihilar patchy opacification representing pneumonia. No pneumothorax    ANYA VÁZQUEZ MD; Attending Interventional Radiologist  This document has been electronically signed. Nov 15 2020  4:21PM    < end of copied text >

## 2020-11-16 NOTE — CONSULT NOTE ADULT - SUBJECTIVE AND OBJECTIVE BOX
HPI:  Patient is a 80 year old female with a pmhx of PPM, DM, HTN, hyperthyroid who presents with AMS. Per daughter she brought patient in as she has been having frequent falls at home. Over the past few days she noticed patient becoming more confused. Patient had fall yesetrday and was unable to get up and was then brought in to Ed. Upon arrival noted to be SVT, HTN. TSH undetectable and had high t3/4. MICU then consulted.  (2020 23:26)  No history available from pt. who is now intubated with suspected pneumonia and/or sepsis. Outpatient med review reveals that she has been on methimazole    PAST MEDICAL & SURGICAL HISTORY:  HTN (hypertension)    DM (diabetes mellitus)    Pacemaker        FAMILY HISTORY:      SOCIAL HISTORY:    REVIEW OF SYSTEMS:  not obtainable    MEDICATIONS  (STANDING):  azithromycin  IVPB      chlorhexidine 0.12% Liquid 15 milliLiter(s) Oral Mucosa every 12 hours  dextrose 50% Injectable 50 milliLiter(s) IV Push every 15 minutes  glucagon  Injectable 1 milliGRAM(s) IntraMuscular once  heparin   Injectable 5000 Unit(s) IV Push once  heparin   Injectable 5000 Unit(s) SubCutaneous every 12 hours  hydrocortisone sodium succinate Injectable 50 milliGRAM(s) IV Push every 6 hours  insulin regular Infusion 4 Unit(s)/Hr (4 mL/Hr) IV Continuous <Continuous>  methimazole 20 milliGRAM(s) Oral every 8 hours  norepinephrine Infusion 0.3 MICROgram(s)/kG/Min (37 mL/Hr) IV Continuous <Continuous>  pantoprazole    Tablet 40 milliGRAM(s) Oral before breakfast  piperacillin/tazobactam IVPB.. 3.375 Gram(s) IV Intermittent every 12 hours  potassium iodide 10%/iodine 5% Oral Liquid - Peds 0.25 milliLiter(s) Oral every 6 hours    MEDICATIONS  (PRN):  acetaminophen   Tablet .. 650 milliGRAM(s) Oral every 6 hours PRN Temp greater or equal to 38C (100.4F)  albuterol/ipratropium for Nebulization. 3 milliLiter(s) Nebulizer every 6 hours PRN Bronchospasm  sodium chloride 0.9% lock flush 10 milliLiter(s) IV Push every 1 hour PRN Pre/post blood products, medications, blood draw, and to maintain line patency      Allergies    No Known Allergies    Intolerances          PHYSICAL EXAM:    Vital Signs Last 24 Hrs  T(C): 38.7 (2020 08:00), Max: 39 (15 Nov 2020 15:00)  T(F): 101.7 (2020 08:00), Max: 102.2 (15 Nov 2020 15:00)  HR: 99 (2020 10:30) (59 - 105)  BP: 77/50 (2020 10:30) (77/50 - 143/69)  BP(mean): 58 (2020 10:30) (58 - 99)  RR: 26 (2020 10:30) (20 - 36)  SpO2: 95% (2020 10:30) (80% - 100%)    General appearance: thin, elderly    Eyes: Pupils equal  No exophthalmos.    Neck: Trachea midline. Thyroid not palpable    Lungs: Normal respiratory excursion.     CV: irregular cardiac rhtyhm    Abdomen: Soft, non tender, no organomegaly or mass.    Musculoskeletal: No cyanosis, clubbing, or edema. No pedal lesions.    Skin: Warm and dry    Neuro: sedated    Psych: sedated            LABS:                        8.9    11.72 )-----------( 97       ( 2020 04:33 )             29.1     11-16    145  |  106  |  59.0<H>  ----------------------------<  375<H>  4.5   |  21.0<L>  |  1.93<H>    Ca    8.9      2020 04:33  Phos  6.0     11-16  Mg     3.0     11-16    TPro  5.1<L>  /  Alb  2.5<L>  /  TBili  2.1<H>  /  DBili  x   /  AST  4526<H>  /  ALT  1230<H>  /  AlkPhos  76  11-16    Urinalysis Basic - ( 2020 22:05 )    Color: Yellow / Appearance: Clear / S.020 / pH: x  Gluc: x / Ketone: Moderate  / Bili: Negative / Urobili: Negative mg/dL   Blood: x / Protein: 100 mg/dL / Nitrite: Negative   Leuk Esterase: Trace / RBC: 3-5 /HPF / WBC 0-2   Sq Epi: x / Non Sq Epi: Moderate / Bacteria: x      LIVER FUNCTIONS - ( 2020 04:33 )  Alb: 2.5 g/dL / Pro: 5.1 g/dL / ALK PHOS: 76 U/L / ALT: 1230 U/L / AST: 4526 U/L / GGT: x             T4, Serum: 21.2 ug/dL ( @ 21:30)      POCT Blood Glucose.: 363 mg/dL (20 @ 10:10)  POCT Blood Glucose.: 403 mg/dL (20 @ 08:54)  POCT Blood Glucose.: 347 mg/dL (20 @ 05:10)  POCT Blood Glucose.: 195 mg/dL (11-15-20 @ 22:54)  POCT Blood Glucose.: 85 mg/dL (11-15-20 @ 18:33)  POCT Blood Glucose.: 177 mg/dL (11-15-20 @ 07:40)

## 2020-11-16 NOTE — PROGRESS NOTE ADULT - PROBLEM SELECTOR PLAN 8
head CT negative yesterday; will repeat again today  cEEG to ensure no nonconvulsive status epilepticus  check ammonia level  fever -- from pneumonia, thyroid storm -- but may need to consider lumbar puncture as well.

## 2020-11-16 NOTE — PROGRESS NOTE ADULT - PROBLEM SELECTOR PLAN 1
aspiration event noted yesterday  however L perihilar area with patchy infiltrates > R perihilar; may have had something prior  send sputum for culture and gram stain  continue with PRN bronchodilators

## 2020-11-16 NOTE — DIETITIAN INITIAL EVALUATION ADULT. - PERTINENT LABORATORY DATA
11-16 Na145 mmol/L Glu 375 mg/dL<H> K+ 4.5 mmol/L Cr  1.93 mg/dL<H> BUN 59.0 mg/dL<H> Phos 6.0 mg/dL<H> Alb 2.5 g/dL<L> PAB n/a

## 2020-11-16 NOTE — DIETITIAN INITIAL EVALUATION ADULT. - OTHER INFO
Pt ia a 80 year old female with a pmhx of PPM, DM, HTN, hyperthyroid who presents with AMS, frequent falls, and weakness. In the ER found to have SVT and undetectable TSH. Thought be in thyroid storm was given beta blocker, steroids, and methimazole, admitted to MICU. Over past 24 hours: patient became acutely hypoxic and tachypneic after eating pudding. She was placed on HFNC and quickly required 100% FiO2. Despite this is remained in acute distress. Barbi her daughter was called and informed of her change in status. She confirms full code status.  Also noted to be more weak with what appears to be a right sided facial droop. She was emergently intubated. Pt remains on vent support; enteral feeds initiated. Unable to interview pt at this time.

## 2020-11-17 NOTE — PROGRESS NOTE ADULT - ASSESSMENT
79 y/o female with pmhx of PPM, DM, HTN, hyperthyroidism (noncompliant with meds) who presents to ED from home after frequent falls and AMS admitted with:    1. thyroid storm  2. acute hypoxic respiratory failure due to flash pulm edema  3. aflutter rvr  4. VASHTI  5. hyperglycemia  6. Metabolic encephalopathy      NEURO: CT head negative. EEG with epileptogenic areas but no epileptiform patterns. start keppra.    CVS: remains on esmolol gtt. titrate for HR < 100. received bolus of amio as well earlier today with improvement. high risk for a/c due to falls.    PULM: remains intubated. actively titrating fio2 and peep for spo2 > 92%. daily SBT  GI: tube feeds as tolerated. protonix daily.   RENAL: VASHTI likley due to shock state. trend bun/cr. nonoliguric.   ID: procal elevated to 51, likely aspiration pna. on zosyn.   ENDO: transitioned off insulin drip to lantus 50 units qhs with sliding scale coverage. goal 140-180. on solucortef, iodine, PTU. endo following.  HEME: heparin for dvt prophylaxis  DISPO: DNR

## 2020-11-17 NOTE — PROGRESS NOTE ADULT - ASSESSMENT
1. Acute metabolic encephalopathy  2. Acute hypoxic respiratory distress 2/2 aspiration pneumonitis?  3. VASHTI  4. Thyroid storm    Plan:  NEURO: Intubated and off sedation, no SBT due to minimal responsiveness. Avoid sedative medication. Continuous EEG in progress/results pending. No LP at this time due to thrombocytopenia. Normal ammonia levels    CV: Remains on Levophed 0.03mg/kg, titrate to MAP of 65. Afib controlled at this time. consult cardiology 1. Acute metabolic encephalopathy  2. Acute hypoxic respiratory distress 2/2 aspiration pneumonitis? vs CAP  3. VASHTI  4. Thyroid storm  5. Transaminitis resolved    Plan:  NEURO: Intubated and off sedation, no SBT due to RAAS of -5. Avoid sedative medication. Continuous EEG in progress/results pending. No LP at this time due to thrombocytopenia. Normal ammonia levels. Head CT neg.    CV: Levophed weaned off this am, maintain MAP of >65. New onset afib overnight, initiate esmolol drip for HR goal 60-90. cardiology consulted. Continue stress dose steroids. EF >75% on echo     PULM: FiO2 currently 40%, wean FiO2 to keep SaO2>94%. Will increase peep if needed. Continue aggressive chest PT    GI/: Diet: Tube feeds (glucerna) at goal, tolerating well.   Continue wiggins for strict I/Os. UOP steady, avoid nephrotoxic agents. Trend BUN/Cr    ID: Unknown cause of fevers overnight  Blood cx: pending, GI PCR Stool and C-diff: pending   UC: pending, sputum cx: pending   Legionella negative, will d/c zithromax   Continue coverage for pna with zosyn     ENDO:   Insulin drip   Continue PTU for hyperthyroid, endo following     HEME:  Heparin for VTE prop  SCD's in place    1. Acute metabolic encephalopathy  2. Acute hypoxic respiratory distress 2/2 aspiration pneumonitis? vs CAP  3. VASHTI  4. Thyroid storm  5. Transaminitis resolved    Plan:  NEURO: Intubated and off sedation, no SBT due to RAAS of -5. Avoid sedative medication. Continuous EEG in progress/results pending. No LP at this time due to thrombocytopenia & supratherapeutic INR. Normal ammonia levels. Head CT neg.    CV: Levophed weaned off this am, maintain MAP of >65. New onset afib overnight, initiate esmolol drip for HR goal 60-90. cardiology consulted. Continue stress dose steroids. EF >75% on echo     PULM: FiO2 currently 40%, wean FiO2 to keep SaO2>94%. Will increase peep if needed. Continue aggressive chest PT    GI/: Diet: Tube feeds (glucerna) at goal, tolerating well.   Continue wiggins for strict I/Os. UOP steady, avoid nephrotoxic agents. Trend BUN/Cr    ID: Unknown cause of fevers overnight  Blood cx: pending, GI PCR Stool and C-diff: pending   UC: pending, sputum cx: pending   Legionella negative, will d/c zithromax   Continue coverage for pna with zosyn     ENDO:   Insulin drip   Continue PTU for hyperthyroid, endo following     HEME:  Heparin for VTE prop  SCD's in place    1. Acute metabolic encephalopathy  2. Acute hypoxic respiratory distress 2/2 aspiration pneumonitis? vs CAP  3. VASHTI  4. Thyroid storm  5. Transaminitis resolved    Plan:  NEURO: Intubated and off sedation, no SBT due to RAAS of -5. Avoid sedative medication. Continuous EEG in progress/results pending. No LP at this time due to thrombocytopenia & supratherapeutic INR. Normal ammonia levels. Head CT neg.    CV: Levophed weaned off this am, maintain MAP of >65. New onset afib overnight, initiate esmolol drip for HR goal 60-90. cardiology consulted ([facility]). Continue stress dose steroids. EF >75% on echo     PULM: FiO2 currently 40%, wean FiO2 to keep SaO2>94%. Will increase peep if needed. Continue aggressive chest PT    GI/: Diet: Tube feeds (glucerna) at goal, tolerating well.   Continue wiggins for strict I/Os. UOP steady, avoid nephrotoxic agents. Trend BUN/Cr    ID: Unknown cause of fevers overnight  Blood cx: pending, GI PCR Stool and C-diff: pending   UC: pending, sputum cx: pending   Legionella negative, will d/c zithromax   Continue coverage for pna with zosyn     ENDO:   Insulin drip   Continue PTU for hyperthyroid, endo following     HEME:  Heparin for VTE prop  SCD's in place

## 2020-11-17 NOTE — GOALS OF CARE CONVERSATION - ADVANCED CARE PLANNING - CONVERSATION DETAILS
Patient was noncompliant with medications; now is suffering from multiorgan dysfuction.  daughter understands the issues, and states that 'mom is a fighter' -- 'it's in god's hands' but 'it is what it is' and understands that this may be fatal.  Futhermore, she understands that ACLS/CPR in these situations doesn't help, so the plan is full aggressive management, but in the setting of a cardiac arrest, DNR

## 2020-11-17 NOTE — EEG REPORT - NS EEG TEXT BOX
[facility]   COMPREHENSIVE EPILEPSY CENTER   REPORT OF LONG-TERM VIDEO EEG     [facility]: [address], [place] [address], Ph#: [phone]  [facility]: [id number], [place] [address], Ph#: [phone]  [facility]: [address] [place] [address], Ph#: [phone]    Patient Name: SAULO LEROY  Age and : 80y ([birth year])  MRN #: [id number]  Location: [place] [address]  Referring Physician: Yonatan Tay    Start Time/Date: 16:59 on 20  End Time/Date: 08:00 on 20  Duration: 14hr 56m    _____________________________________________________________  STUDY INFORMATION    EEG Recording Technique:  The patient underwent continuous Video-EEG monitoring, using Telemetry System hardware on the XLTek Digital System. EEG and video data were stored on a computer hard drive with important events saved in digital archive files. The material was reviewed by a physician (electroencephalographer / epileptologist) on a daily basis. Rafy and seizure detection algorithms were utilized and reviewed. An EEG Technician attended to the patient, and was available throughout daytime work hours.  The epilepsy center neurologist was available in person or on call 24-hours per day.    EEG Placement and Labeling of Electrodes:  The EEG was performed utilizing 20 channel referential EEG connections (coronal over temporal over parasagittal montage) using all standard 10-20 electrode placements with EKG, with additional electrodes placed in the inferior temporal region using the modified 10-10 montage electrode placements for elective admissions, or if deemed necessary. Recording was at a sampling rate of 256 samples per second per channel. Time synchronized digital video recording was done simultaneously with EEG recording. A low light infrared camera was used for low light recording.     _____________________________________________________________  HISTORY    Patient is a 80y old  Female who presents with a chief complaint of AMS (2020 08:20)      PERTINENT MEDICATION:  none    _____________________________________________________________  STUDY INTERPRETATION    Findings: The background was continuous, spontaneously variable and reactive. No posterior dominant rhythm seen.    Background Slowing:  Diffuse theta and polymorphic delta slowing.    Focal Slowing:   None were present.    Sleep Background:  Stage II sleep transients were not recorded.    Other Non-Epileptiform Findings:  None were present.    Interictal Epileptiform Activity:   Stimulus-induced, frequent to abundant, sharp wave discharges in the right frontal (max Fp2), left frontal (max Fp1), left temporal (max F7) regions.    Events:  Clinical events: None recorded.  Seizures: None recorded.    Activation Procedures:   Hyperventilation was not performed.    Photic stimulation was performed and did not elicit any abnormality.     Artifacts:  Intermittent myogenic and movement artifacts were noted.    ECG:  The heart rate on single channel ECG was predominantly between  BPM.    _____________________________________________________________  EEG SUMMARY/CLASSIFICATION    Abnormal EEG in an altered patient.  - Stimulus-induced, frequent to abundant, sharp wave discharges in the right frontal (max Fp2), left frontal (max Fp1), left temporal (max F7) regions.  - Moderate to severe generalized slowing.    _____________________________________________________________  EEG IMPRESSION/CLINICAL CORRELATE    Abnormal EEG study.  1. Potential epileptogenic foci in the right frontal, left frontal and left temporal regions.   2. Moderate to severe nonspecific diffuse or multifocal cerebral dysfunction.   3. No seizure seen.    _____________________________________________________________    Atif Canales MD  Attending Physician, [facility]

## 2020-11-17 NOTE — PROGRESS NOTE ADULT - ASSESSMENT
remains on full/maximal therapy for thyroid storm, on PTU, SSKI, and hydrocortisone. Prognosis depends on response to treatment of underlying conditions  Diabetes well controlled on IV drip    repeat thyroid function tests ordered.

## 2020-11-17 NOTE — PROGRESS NOTE ADULT - SUBJECTIVE AND OBJECTIVE BOX
Patient is a 80y old  Female who presents with a chief complaint of AMS (16 Nov 2020 12:15)      BRIEF HOSPITAL COURSE:  80 year old female, PMHx of PPM, DM, HTN, & Hyperthyroid, presents to the ED after frequent falls and increased confusion at home. In ED pt in found in thyroid storm.   11/15: Pt found with hypoxic respiratory failure while eating was placed on HFNC but was ultimately intubated. Increased weakness and right sided facial droop prior to intubation.     Events last 24 hours:   Patient remains intubated and on low dose Levophed. Remains on insulin drip. Afib with rates 120-130's overnight    PAST MEDICAL & SURGICAL HISTORY:  HTN (hypertension)  DM (diabetes mellitus)  Pacemaker    Review of Systems:  Limited ROS, patient intubated and unresponsive     Medications:  azithromycin  IVPB      azithromycin  IVPB 500 milliGRAM(s) IV Intermittent every 24 hours  piperacillin/tazobactam IVPB.. 3.375 Gram(s) IV Intermittent every 12 hours  norepinephrine Infusion 0.3 MICROgram(s)/kG/Min IV Continuous <Continuous>  albuterol/ipratropium for Nebulization. 3 milliLiter(s) Nebulizer every 6 hours PRN  acetaminophen   Tablet .. 650 milliGRAM(s) Oral every 6 hours PRN  heparin   Injectable 5000 Unit(s) SubCutaneous every 12 hours  pantoprazole    Tablet 40 milliGRAM(s) Oral before breakfast  dextrose 50% Injectable 50 milliLiter(s) IV Push every 15 minutes  glucagon  Injectable 1 milliGRAM(s) IntraMuscular once  hydrocortisone sodium succinate Injectable 50 milliGRAM(s) IV Push every 6 hours  insulin regular Infusion 4 Unit(s)/Hr IV Continuous <Continuous>  propylthiouracil 200 milliGRAM(s) Oral every 8 hours  potassium iodide 10%/iodine 5% Oral Liquid - Peds 0.25 milliLiter(s) Oral every 6 hours  sodium chloride 0.9% lock flush 10 milliLiter(s) IV Push every 1 hour PRN  chlorhexidine 0.12% Liquid 15 milliLiter(s) Oral Mucosa every 12 hours        Mode: AC/ CMV (Assist Control/ Continuous Mandatory Ventilation)  RR (machine): 26  TV (machine): 380  FiO2: 40  PEEP: 5  MAP: 13  PIP: 29      ICU Vital Signs Last 24 Hrs  T(C): 38.1 (17 Nov 2020 08:15), Max: 39.2 (16 Nov 2020 12:30)  T(F): 100.6 (17 Nov 2020 08:15), Max: 102.6 (16 Nov 2020 12:30)  HR: 112 (17 Nov 2020 07:00) (63 - 148)  BP: 132/65 (17 Nov 2020 07:00) (76/51 - 144/80)  BP(mean): 87 (17 Nov 2020 07:00) (55 - 106)  ABP: --  ABP(mean): --  RR: 27 (17 Nov 2020 07:00) (26 - 42)  SpO2: 91% (17 Nov 2020 07:00) (80% - 97%)      ABG - ( 16 Nov 2020 23:02 )  pH, Arterial: 7.44  pH, Blood: x     /  pCO2: 39    /  pO2: 86    / HCO3: 27    / Base Excess: 2.4   /  SaO2: 97          LABS:                        9.1    16.08 )-----------( 87       ( 17 Nov 2020 04:36 )             29.4     11-17    143  |  104  |  80.0<H>  ----------------------------<  140<H>  4.0   |  25.0  |  1.93<H>    Ca    9.2      17 Nov 2020 04:36  Phos  4.5     11-17  Mg     3.1     11-17    TPro  5.1<L>  /  Alb  2.5<L>  /  TBili  2.1<H>  /  DBili  x   /  AST  4526<H>  /  ALT  1230<H>  /  AlkPhos  76  11-16      CAPILLARY BLOOD GLUCOSE  POCT Blood Glucose.: 134 mg/dL (17 Nov 2020 07:46)    PT/INR - ( 16 Nov 2020 12:45 )   PT: 30.5 sec;   INR: 2.76 ratio    PTT - ( 16 Nov 2020 12:45 )  PTT:30.4 sec    CULTURES:  Culture Results:   >=3 organisms. Probable collection contamination. (11-15 @ 01:27)  Culture Results:   No growth at 48 hours (11-14 @ 21:42)  Culture Results:   No growth at 48 hours (11-14 @ 21:41)      Physical Examination:  General: Thin, elderly female, Intubated, no sedation since 11/15, pt minimally responsive to noxious stimuli. continuous EEG in progress     HEENT: Pupils equal, reactive to light.  Symmetric.    PULM: Course BS to b/l lower lobes     CVS: Tachycardic, irregular rhythm, no murmur, rubs or gallops.      ABD: Soft, nondistended, hypoactive bowel sounds, no masses    EXT: No edema, nontender    SKIN: Warm and well perfused, no rashes noted.    RADIOLOGY:   PROCEDURE DATE:  11/15/2020      INTERPRETATION:  Clinical Information: Hypoxia    Technique: 1 AP chest image from 11/14/2020 and 2 AP chest images from 11/15/2020.    Comparison: None    Findings/  Impression: ETT tip above kevin. NG tip below diaphragm. Left pacemaker. Cardiomegaly. Multifocal patchy opacification of the lungs, predominantly bilateral perihilar. Differential diagnosis includes pulmonary edema and multifocal pneumonia.       Patient is a 80y old  Female who presents with a chief complaint of AMS (16 Nov 2020 12:15)      BRIEF HOSPITAL COURSE:  80 year old female, PMHx of PPM, DM, HTN, & Hyperthyroid, presents to the ED after frequent falls and increased confusion at home. In ED pt in found in thyroid storm.   11/15: Pt found with hypoxic respiratory failure while eating was placed on HFNC but was ultimately intubated. Increased weakness and right sided facial droop prior to intubation.     Events last 24 hours:   Patient remains intubated and on low dose Levophed. Remains on insulin drip. Afib with rates 120-130's overnight    PAST MEDICAL & SURGICAL HISTORY:  HTN (hypertension)  DM (diabetes mellitus)  Pacemaker    Review of Systems:  Limited ROS, patient intubated and unresponsive     Medications:  azithromycin  IVPB      azithromycin  IVPB 500 milliGRAM(s) IV Intermittent every 24 hours  piperacillin/tazobactam IVPB.. 3.375 Gram(s) IV Intermittent every 12 hours  norepinephrine Infusion 0.3 MICROgram(s)/kG/Min IV Continuous <Continuous>  albuterol/ipratropium for Nebulization. 3 milliLiter(s) Nebulizer every 6 hours PRN  acetaminophen   Tablet .. 650 milliGRAM(s) Oral every 6 hours PRN  heparin   Injectable 5000 Unit(s) SubCutaneous every 12 hours  pantoprazole    Tablet 40 milliGRAM(s) Oral before breakfast  dextrose 50% Injectable 50 milliLiter(s) IV Push every 15 minutes  glucagon  Injectable 1 milliGRAM(s) IntraMuscular once  hydrocortisone sodium succinate Injectable 50 milliGRAM(s) IV Push every 6 hours  insulin regular Infusion 4 Unit(s)/Hr IV Continuous <Continuous>  propylthiouracil 200 milliGRAM(s) Oral every 8 hours  potassium iodide 10%/iodine 5% Oral Liquid - Peds 0.25 milliLiter(s) Oral every 6 hours  sodium chloride 0.9% lock flush 10 milliLiter(s) IV Push every 1 hour PRN  chlorhexidine 0.12% Liquid 15 milliLiter(s) Oral Mucosa every 12 hours        Mode: AC/ CMV (Assist Control/ Continuous Mandatory Ventilation)  RR (machine): 26  TV (machine): 380  FiO2: 40  PEEP: 5  MAP: 13  PIP: 29      ICU Vital Signs Last 24 Hrs  T(C): 38.1 (17 Nov 2020 08:15), Max: 39.2 (16 Nov 2020 12:30)  T(F): 100.6 (17 Nov 2020 08:15), Max: 102.6 (16 Nov 2020 12:30)  HR: 112 (17 Nov 2020 07:00) (63 - 148)  BP: 132/65 (17 Nov 2020 07:00) (76/51 - 144/80)  BP(mean): 87 (17 Nov 2020 07:00) (55 - 106)  ABP: --  ABP(mean): --  RR: 27 (17 Nov 2020 07:00) (26 - 42)  SpO2: 91% (17 Nov 2020 07:00) (80% - 97%)      ABG - ( 16 Nov 2020 23:02 )  pH, Arterial: 7.44  pH, Blood: x     /  pCO2: 39    /  pO2: 86    / HCO3: 27    / Base Excess: 2.4   /  SaO2: 97          LABS:                        9.1    16.08 )-----------( 87       ( 17 Nov 2020 04:36 )             29.4     11-17    143  |  104  |  80.0<H>  ----------------------------<  140<H>  4.0   |  25.0  |  1.93<H>    Ca    9.2      17 Nov 2020 04:36  Phos  4.5     11-17  Mg     3.1     11-17    TPro  5.1<L>  /  Alb  2.5<L>  /  TBili  2.1<H>  /  DBili  x   /  AST  4526<H>  /  ALT  1230<H>  /  AlkPhos  76  11-16      CAPILLARY BLOOD GLUCOSE  POCT Blood Glucose.: 134 mg/dL (17 Nov 2020 07:46)    PT/INR - ( 16 Nov 2020 12:45 )   PT: 30.5 sec;   INR: 2.76 ratio    PTT - ( 16 Nov 2020 12:45 )  PTT:30.4 sec    CULTURES:  Culture Results:   >=3 organisms. Probable collection contamination. (11-15 @ 01:27)  Culture Results:   No growth at 48 hours (11-14 @ 21:42)  Culture Results:   No growth at 48 hours (11-14 @ 21:41)      Physical Examination:  General: Thin, elderly female, Intubated, no sedation since 11/15, pt minimally responsive to noxious stimuli. continuous EEG in progress     HEENT: Pupils equal, reactive to light.  Symmetric.    PULM: Course BS to b/l lower lobes     CVS: Tachycardic, irregular rhythm, no murmur, rubs or gallops.      ABD: Soft, nondistended, hypoactive bowel sounds, no masses, loose stools with mucous overnight     EXT: No edema to BLE, +2 edema to BUE    SKIN: Warm and well perfused, no rashes noted.    RADIOLOGY:   PROCEDURE DATE:  11/15/2020      INTERPRETATION:  Clinical Information: Hypoxia    Technique: 1 AP chest image from 11/14/2020 and 2 AP chest images from 11/15/2020.    Comparison: None    Findings/  Impression: ETT tip above kevin. NG tip below diaphragm. Left pacemaker. Cardiomegaly. Multifocal patchy opacification of the lungs, predominantly bilateral perihilar. Differential diagnosis includes pulmonary edema and multifocal pneumonia.       Patient is a 80y old  Female who presents with a chief complaint of AMS (16 Nov 2020 12:15)      BRIEF HOSPITAL COURSE:  80 year old female, PMHx of PPM, DM, HTN, & Hyperthyroid, presents to the ED after frequent falls and increased confusion at home. In ED pt arrived in thyroid storm, admitted to MICU for tx.   11/15: Pt developed acute hypoxic respiratory failure while eating was placed on HFNC but was ultimately intubated. Increased weakness and right sided facial droop prior to intubation.     Events last 24 hours:   Patient remains intubated and on low dose Levophed. Remains on insulin drip. New onset afib with rates 120-130's overnight. Pt continues to require ICU level care.    PAST MEDICAL & SURGICAL HISTORY:  HTN (hypertension)  DM (diabetes mellitus)  Pacemaker    Review of Systems:  Limited ROS, patient intubated and minimally responsive     Medications:  azithromycin  IVPB      azithromycin  IVPB 500 milliGRAM(s) IV Intermittent every 24 hours  piperacillin/tazobactam IVPB.. 3.375 Gram(s) IV Intermittent every 12 hours  norepinephrine Infusion 0.3 MICROgram(s)/kG/Min IV Continuous <Continuous>  albuterol/ipratropium for Nebulization. 3 milliLiter(s) Nebulizer every 6 hours PRN  acetaminophen   Tablet .. 650 milliGRAM(s) Oral every 6 hours PRN  heparin   Injectable 5000 Unit(s) SubCutaneous every 12 hours  pantoprazole    Tablet 40 milliGRAM(s) Oral before breakfast  dextrose 50% Injectable 50 milliLiter(s) IV Push every 15 minutes  glucagon  Injectable 1 milliGRAM(s) IntraMuscular once  hydrocortisone sodium succinate Injectable 50 milliGRAM(s) IV Push every 6 hours  insulin regular Infusion 4 Unit(s)/Hr IV Continuous <Continuous>  propylthiouracil 200 milliGRAM(s) Oral every 8 hours  potassium iodide 10%/iodine 5% Oral Liquid - Peds 0.25 milliLiter(s) Oral every 6 hours  sodium chloride 0.9% lock flush 10 milliLiter(s) IV Push every 1 hour PRN  chlorhexidine 0.12% Liquid 15 milliLiter(s) Oral Mucosa every 12 hours        Mode: AC/ CMV (Assist Control/ Continuous Mandatory Ventilation)  RR (machine): 26  TV (machine): 380  FiO2: 40  PEEP: 5  MAP: 13  PIP: 29      ICU Vital Signs Last 24 Hrs  T(C): 38.1 (17 Nov 2020 08:15), Max: 39.2 (16 Nov 2020 12:30)  T(F): 100.6 (17 Nov 2020 08:15), Max: 102.6 (16 Nov 2020 12:30)  HR: 112 (17 Nov 2020 07:00) (63 - 148)  BP: 132/65 (17 Nov 2020 07:00) (76/51 - 144/80)  BP(mean): 87 (17 Nov 2020 07:00) (55 - 106)  ABP: --  ABP(mean): --  RR: 27 (17 Nov 2020 07:00) (26 - 42)  SpO2: 91% (17 Nov 2020 07:00) (80% - 97%)      ABG - ( 16 Nov 2020 23:02 )  pH, Arterial: 7.44  pH, Blood: x     /  pCO2: 39    /  pO2: 86    / HCO3: 27    / Base Excess: 2.4   /  SaO2: 97          LABS:                        9.1    16.08 )-----------( 87       ( 17 Nov 2020 04:36 )             29.4     11-17    143  |  104  |  80.0<H>  ----------------------------<  140<H>  4.0   |  25.0  |  1.93<H>    Ca    9.2      17 Nov 2020 04:36  Phos  4.5     11-17  Mg     3.1     11-17    TPro  5.1<L>  /  Alb  2.5<L>  /  TBili  2.1<H>  /  DBili  x   /  AST  4526<H>  /  ALT  1230<H>  /  AlkPhos  76  11-16      CAPILLARY BLOOD GLUCOSE  POCT Blood Glucose.: 134 mg/dL (17 Nov 2020 07:46)    PT/INR - ( 16 Nov 2020 12:45 )   PT: 30.5 sec;   INR: 2.76 ratio    PTT - ( 16 Nov 2020 12:45 )  PTT:30.4 sec    CULTURES:  Culture Results:   >=3 organisms. Probable collection contamination. (11-15 @ 01:27)  Culture Results:   No growth at 48 hours (11-14 @ 21:42)  Culture Results:   No growth at 48 hours (11-14 @ 21:41)      Physical Examination:  General: Thin, elderly female, Intubated, no sedation since 11/15, pt minimally responsive to noxious stimuli. continuous EEG in progress     HEENT: Pupils equal, reactive to light.  Symmetric.    PULM: Course BS to b/l lower lobes     CVS: Tachycardic, irregular rhythm, no murmur, rubs or gallops.      ABD: Soft, nondistended, hypoactive bowel sounds, no masses, loose stools with mucous overnight     EXT: No edema to BLE, +2 edema to BUE    SKIN: Warm and well perfused, no rashes noted.    RADIOLOGY:   PROCEDURE DATE:  11/15/2020      INTERPRETATION:  Clinical Information: Hypoxia    Technique: 1 AP chest image from 11/14/2020 and 2 AP chest images from 11/15/2020.    Comparison: None    Findings/  Impression: ETT tip above kevin. NG tip below diaphragm. Left pacemaker. Cardiomegaly. Multifocal patchy opacification of the lungs, predominantly bilateral perihilar. Differential diagnosis includes pulmonary edema and multifocal pneumonia.    PROCEDURE DATE:  11/15/2020      INTERPRETATION:  Clinical Information: RIJ central line. Respiratory failure    Technique: AP chest image.    Comparison: 11/15/2020    Findings/  Impression: ETT tip above kevin. NG tip below diaphragm. Right IJ catheter tip overlies the SVC. Left pacemaker. Large perihilar patchy opacification representing pneumonia. No pneumothorax

## 2020-11-17 NOTE — PROGRESS NOTE ADULT - SUBJECTIVE AND OBJECTIVE BOX
[facility]  INFECTIOUS DISEASES AND INTERNAL MEDICINE at [place]  =======================================================  Willam Luu MD  Diplomates American Board of Internal Medicine and Infectious Diseases  Tel  [phone]  Fax [phone]  =======================================================    [id number]  SAULO LEROY  follow up:  lung infiltrates, fevers    RN reports loose BM and fevers this AM.   cultures in process.      off pressors this AM,   on insulin gtt     =======================================================    REVIEW OF SYSTEMS:  Limited due to medical condition    =======================================================  Allergies  No Known Allergies     ======================================================  Physical Exam:  ============    General:  THIN FRAIL, sedated  Eye: Pupils are equal, round and reactive to light,  BILATERAL pterygium,  right WORSE THAN LEFT;  bilateral cataracts  HENT: Normocephalic, Oral mucosa is dry  ET Tube in place  Neck: Supple, No lymphadenopathy.  Respiratory: Lungs with fair air entry  Cardiovascular: Normal rate, Regular rhythm,   Gastrointestinal: Soft,  Non-distended, Normal bowel sounds.  Genitourinary: No costovertebral angle tenderness.  Lymphatics: No lymphadenopathy neck,   Musculoskeletal: no joint abnl  Integumentary: No rash.  Neurologic: sedated, unable to assess    =======================================================   Vitals:  ============  T(F): 100.9 (17 Nov 2020 11:07), Max: 102.2 (16 Nov 2020 13:00)  HR: 123 (17 Nov 2020 12:05)  BP: 142/67 (17 Nov 2020 12:00)  RR: 32 (17 Nov 2020 12:00)  SpO2: 92% (17 Nov 2020 12:05) (90% - 96%)  temp max in last 48H T(F): , Max: 102.6 (11-16-20 @ 12:30)    =======================================================  Current Antibiotics:  piperacillin/tazobactam IVPB.. 3.375 Gram(s) IV Intermittent every 12 hours    Other medications:  chlorhexidine 0.12% Liquid 15 milliLiter(s) Oral Mucosa every 12 hours  dextrose 50% Injectable 50 milliLiter(s) IV Push every 15 minutes  esMOLOL  Infusion 50 MICROgram(s)/kG/Min IV Continuous <Continuous>  glucagon  Injectable 1 milliGRAM(s) IntraMuscular once  heparin   Injectable 5000 Unit(s) SubCutaneous every 12 hours  hydrocortisone sodium succinate Injectable 50 milliGRAM(s) IV Push every 6 hours  insulin regular Infusion 4 Unit(s)/Hr IV Continuous <Continuous>  norepinephrine Infusion 0.3 MICROgram(s)/kG/Min IV Continuous <Continuous>  pantoprazole  Injectable 40 milliGRAM(s) IV Push daily  potassium iodide 10%/iodine 5% Oral Liquid - Peds 0.25 milliLiter(s) Oral every 6 hours  propylthiouracil 200 milliGRAM(s) Oral every 8 hours      =======================================================  Labs:                        9.1    16.08 )-----------( 87       ( 17 Nov 2020 04:36 )             29.4      11-17    143  |  104  |  80.0<H>  ----------------------------<  140<H>  4.0   |  25.0  |  1.93<H>    Ca    9.2      17 Nov 2020 04:36  Phos  4.5     11-17  Mg     3.1     11-17    TPro  5.6<L>  /  Alb  2.6<L>  /  TBili  3.8<H>  /  DBili  2.7<H>  /  AST  1330<H>  /  ALT  1234<H>  /  AlkPhos  99  11-17      Culture - Urine (collected 11-15-20 @ 01:27)  Source: .Urine Clean Catch (Midstream)  Final Report (11-16-20 @ 19:28):    >=3 organisms. Probable collection contamination.    Culture - Blood (collected 11-14-20 @ 21:42)  Source: .Blood Blood-Peripheral    Culture - Blood (collected 11-14-20 @ 21:41)  Source: .Blood Blood-Peripheral      Creatinine, Serum: 1.93 mg/dL (11-17-20 @ 04:36)  Creatinine, Serum: 1.93 mg/dL (11-16-20 @ 04:33)  Creatinine, Serum: 1.24 mg/dL (11-15-20 @ 14:09)  Creatinine, Serum: 0.68 mg/dL (11-15-20 @ 06:16)  Creatinine, Serum: 0.74 mg/dL (11-14-20 @ 21:30)    Procalcitonin, Serum: 51.48 ng/mL (11-17-20 @ 04:36)      WBC Count: 16.08 K/uL (11-17-20 @ 04:36)  WBC Count: 11.72 K/uL (11-16-20 @ 04:33)  WBC Count: 7.78 K/uL (11-15-20 @ 14:09)  WBC Count: 9.17 K/uL (11-15-20 @ 06:16)  WBC Count: 7.86 K/uL (11-14-20 @ 21:30)      COVID-19 IgG Antibody Interpretation: Negative (11-15-20 @ 08:21)  COVID-19 IgG Antibody Index: <0.10 Index (11-15-20 @ 08:21)  COVID-19 PCR: NotDetec (11-14-20 @ 22:00)      Alkaline Phosphatase, Serum: 99 U/L (11-17-20 @ 04:36)  Alkaline Phosphatase, Serum: 76 U/L (11-16-20 @ 04:33)  Alkaline Phosphatase, Serum: 79 U/L (11-15-20 @ 14:09)  Alkaline Phosphatase, Serum: 96 U/L (11-14-20 @ 21:30)  Alanine Aminotransferase (ALT/SGPT): 1234 U/L (11-17-20 @ 04:36)  Alanine Aminotransferase (ALT/SGPT): 1230 U/L (11-16-20 @ 04:33)  Alanine Aminotransferase (ALT/SGPT): 280 U/L (11-15-20 @ 14:09)  Alanine Aminotransferase (ALT/SGPT): 22 U/L (11-14-20 @ 21:30)  Aspartate Aminotransferase (AST/SGOT): 1330 U/L (11-17-20 @ 04:36)  Aspartate Aminotransferase (AST/SGOT): 4526 U/L (11-16-20 @ 04:33)  Aspartate Aminotransferase (AST/SGOT): 841 U/L (11-15-20 @ 14:09)  Aspartate Aminotransferase (AST/SGOT): 30 U/L (11-14-20 @ 21:30)  Bilirubin Total, Serum: 3.8 mg/dL (11-17-20 @ 04:36)  Bilirubin Total, Serum: 2.1 mg/dL (11-16-20 @ 04:33)  Bilirubin Total, Serum: 1.4 mg/dL (11-15-20 @ 14:09)  Bilirubin Total, Serum: 0.9 mg/dL (11-14-20 @ 21:30)  Bilirubin Direct, Serum: 2.7 mg/dL (11-17-20 @ 04:36)     Attending Only I personally performed the services described in the documentation, reviewed the documentation recorded by the scribe in my presence and it accurately and completely records my words and action.

## 2020-11-17 NOTE — CONSULT NOTE ADULT - SUBJECTIVE AND OBJECTIVE BOX
[facility]                                   [address] [place] [address]                                                      PHONE: [phone]                                                         FAX: [phone]  [email or web address]  ---------------------------------------------------------------------------------------------------------------------------------    Reason for Consult: AF     HPI:  SAULO LEROY is an 80y Female with history of DM HTN HLD none obstructive CAD prior cath  but Nuclear stress test 2019 normal perfusion SSS s/p PPM who presents to ED AMS and frequent falls at home.  The patient was found have thyroid storm and was admitted to MICU for acute respiratory failure likely due to ASP PNA.  Severe sepsis VASHTI and shock liver.  The patient was found to have new onset AF RVR and currently on esmolol gtt and off Levophed.  's and 's.  History was obtained from chart since patient intubated.        PAST MEDICAL & SURGICAL HISTORY:  HTN (hypertension)    DM (diabetes mellitus)    Pacemaker    No Known Allergies    MEDICATIONS  (STANDING):  chlorhexidine 0.12% Liquid 15 milliLiter(s) Oral Mucosa every 12 hours  dextrose 50% Injectable 50 milliLiter(s) IV Push every 15 minutes  esMOLOL  Infusion 50 MICROgram(s)/kG/Min (19.7 mL/Hr) IV Continuous <Continuous>  glucagon  Injectable 1 milliGRAM(s) IntraMuscular once  heparin   Injectable 5000 Unit(s) SubCutaneous every 12 hours  hydrocortisone sodium succinate Injectable 50 milliGRAM(s) IV Push every 6 hours  insulin regular Infusion 4 Unit(s)/Hr (4 mL/Hr) IV Continuous <Continuous>  norepinephrine Infusion 0.3 MICROgram(s)/kG/Min (37 mL/Hr) IV Continuous <Continuous>  pantoprazole  Injectable 40 milliGRAM(s) IV Push daily  piperacillin/tazobactam IVPB.. 3.375 Gram(s) IV Intermittent every 12 hours  potassium iodide 10%/iodine 5% Oral Liquid - Peds 0.25 milliLiter(s) Oral every 6 hours  propylthiouracil 200 milliGRAM(s) Oral every 8 hours    MEDICATIONS  (PRN):  acetaminophen   Tablet .. 650 milliGRAM(s) Oral every 6 hours PRN Temp greater or equal to 38C (100.4F)  albuterol/ipratropium for Nebulization. 3 milliLiter(s) Nebulizer every 6 hours PRN Bronchospasm  sodium chloride 0.9% lock flush 10 milliLiter(s) IV Push every 1 hour PRN Pre/post blood products, medications, blood draw, and to maintain line patency      Social History:  Cigarettes:         none            Alchohol:        none          Illicit Drug Abuse:  none     FH negative for CAD     ROS: Negative other than as mentioned in HPI.    Vital Signs Last 24 Hrs  T(C): 37.7 (2020 13:00), Max: 38.9 (2020 03:00)  T(F): 99.9 (2020 13:00), Max: 102 (2020 03:00)  HR: 124 (2020 15:00) (98 - 144)  BP: 110/55 (2020 15:00) (77/45 - 158/77)  BP(mean): 75 (2020 15:00) (55 - 103)  RR: 28 (2020 15:00) (26 - 42)  SpO2: 92% (2020 15:00) (90% - 95%)  ICU Vital Signs Last 24 Hrs  SAULO LEROY  I&O's Detail    2020 07:01  -  2020 07:00  --------------------------------------------------------  IN:    Enteral Tube Flush: 1000 mL    Glucerna: 960 mL    Insulin: 77.5 mL    IV PiggyBack: 75 mL    Norepinephrine: 147 mL  Total IN: 2259.5 mL    OUT:    Indwelling Catheter - Urethral (mL): 935 mL  Total OUT: 935 mL    Total NET: 1324.5 mL      2020 07:01  -  2020 16:07  --------------------------------------------------------  IN:    Enteral Tube Flush: 250 mL    Esmolol: 350 mL    Glucerna: 320 mL    Insulin: 29 mL    Norepinephrine: 3.6 mL  Total IN: 952.6 mL    OUT:    Indwelling Catheter - Urethral (mL): 720 mL  Total OUT: 720 mL    Total NET: 232.6 mL        I&O's Summary    :  -  2020 07:00  --------------------------------------------------------  IN: 2259.5 mL / OUT: 935 mL / NET: 1324.5 mL    2020 07:  -  2020 16:07  --------------------------------------------------------  IN: 952.6 mL / OUT: 720 mL / NET: 232.6 mL      Drug Dosing Weight  SAULO LEROY  Mode: AC/ CMV (Assist Control/ Continuous Mandatory Ventilation), RR (machine): 26, TV (machine): 380, FiO2: 50, PEEP: 5, MAP: 12, PIP: 27    PHYSICAL EXAM:  General: Appears well developed, well nourished alert and cooperative.  HEENT: Head; normocephalic, atraumatic.  Eyes: Pupils reactive, cornea wnl.  Neck: Supple, no nodes adenopathy, no NVD or carotid bruit or thyromegaly.  CARDIOVASCULAR: Normal S1 and S2, 2/6 murmur, rub, gallop or lift. /  LUNGS: Decrease BS B/L   ABDOMEN: Soft, nontender without mass or organomegaly. bowel sounds normoactive.  EXTREMITIES: No clubbing, cyanosis or edema. Distal pulses wnl.   SKIN: warm and dry with normal turgor.  NEURO: Sedated and intubated         LABS:                        9.1    16.08 )-----------( 87       ( 2020 04:36 )             29.4     11-17    143  |  104  |  80.0<H>  ----------------------------<  140<H>  4.0   |  25.0  |  1.93<H>    Ca    9.2      2020 04:36  Phos  4.5     11-  Mg     3.1     -17    TPro  5.6<L>  /  Alb  2.6<L>  /  TBili  3.8<H>  /  DBili  2.7<H>  /  AST  1330<H>  /  ALT  1234<H>  /  AlkPhos  99  -17    SAULO LEROY      PT/INR - ( 2020 10:37 )   PT: 22.1 sec;   INR: 1.97 ratio         PTT - ( 2020 12:45 )  PTT:30.4 sec  Urinalysis Basic - ( 2020 10:38 )    Color: Yellow / Appearance: Clear / S.010 / pH: x  Gluc: x / Ketone: Negative  / Bili: Negative / Urobili: Negative mg/dL   Blood: x / Protein: 30 mg/dL / Nitrite: Negative   Leuk Esterase: Negative / RBC: 25-50 /HPF / WBC 0-2   Sq Epi: x / Non Sq Epi: Occasional / Bacteria: Few        RADIOLOGY & ADDITIONAL STUDIES:    INTERPRETATION OF TELEMETRY (personally reviewed): AF mild RVR     ECG:  Diagnosis Line Atrial fibrillation with rapid ventricular response with occasional ventricular-paced complexes  Left axis deviation  Minimal voltage criteria for LVH, may be normal variant  Marked ST abnormality, possible lateral subendocardial injury  Abnormal ECG    ECHO:  Summary:   1. Left ventricular ejection fraction, by visual estimation, is >75%.   2. Hyperdynamic global left ventricular systolic function.   3. Severely enlarged right atrium.   4. The mitral in-flow pattern reveals no discernable A-wave, therefore no comment on diastolic function can be made.   5. There is mild concentric left ventricular hypertrophy.   6. Moderately enlarged left atrium.   7. Mild mitral annular calcification.   8. Mild mitral valve regurgitation.   9. Thickening and calcification of the anterior and posterior mitral valve leaflets.  10. Moderate tricuspid regurgitation.  11. Sclerotic aortic valve with normal opening.  12. Dilateed Asc Ao at 3.8 cm. Dilated Ao root at 3.8cm.  13. Estimated pulmonary artery systolic pressure is 37.8 mmHg assuming a right atrial pressure of 8 mmHg, which is consistent with borderline pulmonary hypertension.    MD Angélica Electronically signed on 2020 at 10:09:29 AM      STRESS TEST: 3/2019 normal perfusion     CARDIAC CATHETERIZATION:  none obstructive CAD     Assessment and Plan:  In summary, SAULO LEROY is an 80y Female with past medical history significant for 80y Female with history of DM HTN HLD none obstructive CAD prior cath  but Nuclear stress test  normal perfusion SSS s/p PPM who presents to ED AMS and frequent falls at home.  The patient was found have thyroid storm and was admitted to MICU for acute respiratory failure likely due to ASP PNA.  Severe sepsis VASHTI and shock liver.  The patient was found to have new onset AF RVR and currently on esmolol gtt and off Levophed.  's and 's.  History was obtained from chart since patient intubated.  TTE hyperdynamic LV EF > 75%.  Not a good candidate for long term AC due to bleeding and fall risk.  Patient is DNR     Plan  1.  Agree Esmolol gtt   2.  Consider one liter NS at 75 cc/hours   3.  Abx as per ID       Care as per MICU team

## 2020-11-17 NOTE — PROGRESS NOTE ADULT - ASSESSMENT
This 80 year old female with a pmhx of PPM, DM, HTN, hyperthyroid who presents with AMS. Per daughter she brought patient in as she has been having frequent falls at home. Over the past few days she noticed patient becoming more confused. Patient had fall (11/13/2020) and was unable to get up and was then brought in to Ed. Upon arrival noted to be SVT, HTN. TSH undetectable and had high t3/4. MICU then consulted.  (14 Nov 2020 23:26)    he was found with acute respiratory failure with hypoxia. Per team, aspiration event noted as well.   Images showed  L perihilar area with patchy infiltrates > R perihilar.  empiric antibiotics were started.     patient remains intubated under MICU care.  Thyroid storm also being managed by the ICU    Impression:  - acute respiratory failure  - lung infiltrates  - thyroid storm  - WBC elevation  - acute renal failure  - transaminitis      Plan:  - continue empiric antibiotics for now   - off Zithromax - legionella negative    Serum BNP markedly elevated  - consider cardiology input     Liver enzymes elevated  - likely related to sepsis.   - consider US of liver/ hepatic system    Acute renal failure  - will watch closely    - follow up all outstanding cultures  - trend temperature and WBC curve  - repeat cultures from blood and all sources if febrile

## 2020-11-17 NOTE — PROGRESS NOTE ADULT - SUBJECTIVE AND OBJECTIVE BOX
INTERVAL HPI/OVERNIGHT EVENTS: follow up thyroid storm, diabetes    MEDICATIONS  (STANDING):  chlorhexidine 0.12% Liquid 15 milliLiter(s) Oral Mucosa every 12 hours  dextrose 50% Injectable 50 milliLiter(s) IV Push every 15 minutes  esMOLOL  Infusion 50 MICROgram(s)/kG/Min (19.7 mL/Hr) IV Continuous <Continuous>  glucagon  Injectable 1 milliGRAM(s) IntraMuscular once  heparin   Injectable 5000 Unit(s) SubCutaneous every 12 hours  hydrocortisone sodium succinate Injectable 50 milliGRAM(s) IV Push every 6 hours  insulin regular Infusion 4 Unit(s)/Hr (4 mL/Hr) IV Continuous <Continuous>  norepinephrine Infusion 0.3 MICROgram(s)/kG/Min (37 mL/Hr) IV Continuous <Continuous>  pantoprazole  Injectable 40 milliGRAM(s) IV Push daily  piperacillin/tazobactam IVPB.. 3.375 Gram(s) IV Intermittent every 12 hours  potassium iodide 10%/iodine 5% Oral Liquid - Peds 0.25 milliLiter(s) Oral every 6 hours  propylthiouracil 200 milliGRAM(s) Oral every 8 hours    MEDICATIONS  (PRN):  acetaminophen   Tablet .. 650 milliGRAM(s) Oral every 6 hours PRN Temp greater or equal to 38C (100.4F)  albuterol/ipratropium for Nebulization. 3 milliLiter(s) Nebulizer every 6 hours PRN Bronchospasm  sodium chloride 0.9% lock flush 10 milliLiter(s) IV Push every 1 hour PRN Pre/post blood products, medications, blood draw, and to maintain line patency      Allergies    No Known Allergies    Intolerances        Vital Signs Last 24 Hrs  T(C): 37.7 (2020 13:00), Max: 38.9 (2020 03:00)  T(F): 99.9 (2020 13:00), Max: 102 (2020 03:00)  HR: 108 (2020 14:00) (95 - 144)  BP: 114/55 (2020 14:00) (77/45 - 155/68)  BP(mean): 79 (2020 14:00) (55 - 106)  RR: 30 (2020 14:00) (26 - 42)  SpO2: 92% (2020 14:00) (90% - 96%)          LABS:                        9.1    16.08 )-----------( 87       ( 2020 04:36 )             29.4         143  |  104  |  80.0<H>  ----------------------------<  140<H>  4.0   |  25.0  |  1.93<H>    Ca    9.2      2020 04:36  Phos  4.5       Mg     3.1         TPro  5.6<L>  /  Alb  2.6<L>  /  TBili  3.8<H>  /  DBili  2.7<H>  /  AST  1330<H>  /  ALT  1234<H>  /  AlkPhos  99      Urinalysis Basic - ( 2020 10:38 )    Color: Yellow / Appearance: Clear / S.010 / pH: x  Gluc: x / Ketone: Negative  / Bili: Negative / Urobili: Negative mg/dL   Blood: x / Protein: 30 mg/dL / Nitrite: Negative   Leuk Esterase: Negative / RBC: 25-50 /HPF / WBC 0-2   Sq Epi: x / Non Sq Epi: Occasional / Bacteria: Few          POCT Blood Glucose.: 156 mg/dL (20 @ 14:01)  POCT Blood Glucose.: 104 mg/dL (20 @ 12:52)  POCT Blood Glucose.: 108 mg/dL (20 @ 12:35)  POCT Blood Glucose.: 121 mg/dL (20 @ 11:45)  POCT Blood Glucose.: 138 mg/dL (20 @ 10:35)  POCT Blood Glucose.: 163 mg/dL (20 @ 09:58)  POCT Blood Glucose.: 158 mg/dL (20 @ 08:53)  POCT Blood Glucose.: 134 mg/dL (20 @ 07:46)  POCT Blood Glucose.: 167 mg/dL (20 @ 06:36)  POCT Blood Glucose.: 161 mg/dL (20 @ 05:42)  POCT Blood Glucose.: 170 mg/dL (20 @ 04:18)  POCT Blood Glucose.: 162 mg/dL (20 @ 03:18)  POCT Blood Glucose.: 167 mg/dL (20 @ 02:06)  POCT Blood Glucose.: 156 mg/dL (20 @ 01:08)  POCT Blood Glucose.: 157 mg/dL (20 @ 00:09)  POCT Blood Glucose.: 149 mg/dL (20 @ 22:57)  POCT Blood Glucose.: 201 mg/dL (20 @ 21:18)  POCT Blood Glucose.: 214 mg/dL (20 @ 20:01)  POCT Blood Glucose.: 204 mg/dL (20 @ 19:00)  POCT Blood Glucose.: 216 mg/dL (20 @ 18:00)  POCT Blood Glucose.: 222 mg/dL (20 @ 17:17)  POCT Blood Glucose.: 239 mg/dL (20 @ 16:05)  POCT Blood Glucose.: 269 mg/dL (20 @ 15:04)  POCT Blood Glucose.: 289 mg/dL (20 @ 14:08)  POCT Blood Glucose.: 267 mg/dL (20 @ 13:02)  POCT Blood Glucose.: 234 mg/dL (20 @ 12:07)  POCT Blood Glucose.: 307 mg/dL (20 @ 11:11)  POCT Blood Glucose.: 363 mg/dL (20 @ 10:10)  POCT Blood Glucose.: 403 mg/dL (20 @ 08:54)  POCT Blood Glucose.: 347 mg/dL (20 @ 05:10)  POCT Blood Glucose.: 195 mg/dL (11-15-20 @ 22:54)  POCT Blood Glucose.: 85 mg/dL (11-15-20 @ 18:33)  POCT Blood Glucose.: 177 mg/dL (11-15-20 @ 07:40)

## 2020-11-17 NOTE — PROGRESS NOTE ADULT - SUBJECTIVE AND OBJECTIVE BOX
Patient is a 80y old  Female who presents with a chief complaint of AMS (2020 16:07)      BRIEF HOSPITAL COURSE: 79 y/o female with pmhx of PPM, DM, HTN, hyperthyroidism (noncompliant with meds) who presents to ED from home after frequent falls and AMS admitted with thyroid storm, intubated for hypoxic respiratory failure due to pulm edema. ? right sided facial droop prior to intubation. CT head negative. hospital course further complicated by aflutter with RVR    Events last 24 hours: remains intubated with poor mental status off of sedation for > 24 hours.     PAST MEDICAL & SURGICAL HISTORY:  HTN (hypertension)    DM (diabetes mellitus)    Pacemaker        Review of Systems:  unable to obtain due to AMS      Medications:  piperacillin/tazobactam IVPB.. 3.375 Gram(s) IV Intermittent every 12 hours    esMOLOL  Infusion 50 MICROgram(s)/kG/Min IV Continuous <Continuous>    albuterol/ipratropium for Nebulization. 3 milliLiter(s) Nebulizer every 6 hours PRN    acetaminophen   Tablet .. 650 milliGRAM(s) Oral every 6 hours PRN  fentaNYL    Injectable 50 MICROGram(s) IV Push every 2 hours PRN      heparin   Injectable 5000 Unit(s) SubCutaneous every 12 hours    pantoprazole  Injectable 40 milliGRAM(s) IV Push daily      dextrose 40% Gel 15 Gram(s) Oral once  dextrose 50% Injectable 50 milliLiter(s) IV Push every 15 minutes  glucagon  Injectable 1 milliGRAM(s) IntraMuscular once  hydrocortisone sodium succinate Injectable 50 milliGRAM(s) IV Push every 6 hours  insulin glargine Injectable (LANTUS) 50 Unit(s) SubCutaneous at bedtime  insulin lispro (ADMELOG) corrective regimen sliding scale   SubCutaneous every 6 hours  propylthiouracil 200 milliGRAM(s) Oral every 8 hours    dextrose 5%. 1000 milliLiter(s) IV Continuous <Continuous>  dextrose 5%. 1000 milliLiter(s) IV Continuous <Continuous>  multiple electrolytes Injection Type 1 1000 milliLiter(s) IV Continuous <Continuous>  potassium iodide 10%/iodine 5% Oral Liquid - Peds 0.25 milliLiter(s) Oral every 6 hours  sodium chloride 0.9% lock flush 10 milliLiter(s) IV Push every 1 hour PRN      chlorhexidine 0.12% Liquid 15 milliLiter(s) Oral Mucosa every 12 hours        Mode: AC/ CMV (Assist Control/ Continuous Mandatory Ventilation)  RR (machine): 26  TV (machine): 380  FiO2: 50  PEEP: 5  MAP: 15  PIP: 30      ICU Vital Signs Last 24 Hrs  T(C): 37.3 (2020 00:33), Max: 38.9 (2020 03:00)  T(F): 99.1 (2020 00:33), Max: 102 (2020 03:00)  HR: 115 (2020 00:15) (100 - 131)  BP: 146/68 (2020 22:00) (77/45 - 158/77)  BP(mean): 94 (2020 22:00) (55 - 103)  ABP: --  ABP(mean): --  RR: 26 (2020 22:00) (26 - 35)  SpO2: 94% (2020 00:15) (90% - 94%)      ABG - ( 2020 23:02 )  pH, Arterial: 7.44  pH, Blood: x     /  pCO2: 39    /  pO2: 86    / HCO3: 27    / Base Excess: 2.4   /  SaO2: 97                  I&O's Detail    2020 07:01  -  2020 07:00  --------------------------------------------------------  IN:    Enteral Tube Flush: 1000 mL    Glucerna: 960 mL    Insulin: 77.5 mL    IV PiggyBack: 75 mL    Norepinephrine: 147 mL  Total IN: 2259.5 mL    OUT:    Indwelling Catheter - Urethral (mL): 935 mL  Total OUT: 935 mL    Total NET: 1324.5 mL      2020 07:01  -  2020 00:45  --------------------------------------------------------  IN:    Enteral Tube Flush: 250 mL    Esmolol: 350 mL    Glucerna: 600 mL    Insulin: 49 mL    multiple electrolytes Injection Type 1.: 300 mL    Norepinephrine: 3.6 mL  Total IN: 1552.6 mL    OUT:    Indwelling Catheter - Urethral (mL): 1220 mL  Total OUT: 1220 mL    Total NET: 332.6 mL            LABS:                        9.1    16.08 )-----------( 87       ( 2020 04:36 )             29.4         143  |  104  |  80.0<H>  ----------------------------<  140<H>  4.0   |  25.0  |  1.93<H>    Ca    9.2      2020 04:36  Phos  4.5       Mg     3.1         TPro  5.6<L>  /  Alb  2.6<L>  /  TBili  3.8<H>  /  DBili  2.7<H>  /  AST  1330<H>  /  ALT  1234<H>  /  AlkPhos  99            CAPILLARY BLOOD GLUCOSE      POCT Blood Glucose.: 161 mg/dL (2020 00:35)    PT/INR - ( 2020 10:37 )   PT: 22.1 sec;   INR: 1.97 ratio         PTT - ( 2020 12:45 )  PTT:30.4 sec  Urinalysis Basic - ( 2020 10:38 )    Color: Yellow / Appearance: Clear / S.010 / pH: x  Gluc: x / Ketone: Negative  / Bili: Negative / Urobili: Negative mg/dL   Blood: x / Protein: 30 mg/dL / Nitrite: Negative   Leuk Esterase: Negative / RBC: 25-50 /HPF / WBC 0-2   Sq Epi: x / Non Sq Epi: Occasional / Bacteria: Few      CULTURES:  Culture Results:   >=3 organisms. Probable collection contamination. (11-15 @ 01:27)  Culture Results:   No growth at 48 hours ( @ 21:42)  Culture Results:   No growth at 48 hours ( @ 21:41)      Physical Examination:    General: No acute distress.      HEENT: Pupils equal, reactive to light.  Symmetric.    PULM: Clear to auscultation bilaterally, no significant sputum production    NECK: Supple, no lymphadenopathy, trachea midline    CVS: Regular rate and rhythm, no murmurs, rubs, or gallops    ABD: Soft, nondistended, nontender, normoactive bowel sounds, no masses    EXT: No edema, nontender    SKIN: Warm and well perfused, no rashes noted.    NEURO: + cough and gag reflex. grimaces to noxious stimuli. no response to verbal stimuli. does not follow commands.    DEVICES:     RADIOLOGY:   IMPRESSION: No acute intracranial hemorrhage, mass effect, or shift of the midline structures.    Similar-appearing moderate severity chronic white matter microvascular type changes.            LIAM SEGURA MD; Attending Radiologist  This document has been electronically signed. 2020 10:04AM    CRITICAL CARE TIME SPENT: 40 minutes of critical care time spent providing medical care for patient's acute illness/conditions that impairs at least one vital organ system and/or poses a high risk of imminent or life threatening deterioration in the patient's condition. It includes time spent evaluating and treating the patient's acute illness as well as time spent reviewing labs, radiology, discussing goals of care with patient and/or patient's family, and discussing the case with a multidisciplinary team in an effort to prevent further life threatening deterioration or end organ damage. This time is independent of any procedures performed.

## 2020-11-18 NOTE — PROGRESS NOTE ADULT - SUBJECTIVE AND OBJECTIVE BOX
Patient is a 80y old  Female who presents with a chief complaint of AMS (2020 16:59)      BRIEF HOSPITAL COURSE:   81 yo f pmhx DM2, HTN, PPM and hyperthyroidism presented s/p frequent falls/AMS at home admitted to MICU with thyroid storm.      11/15: developed worsening hypoxic respiratory failure.     Events last 24 hours:   Worsening hypoxia this evening despite being bagged, CXR appears improved, LE dopplers ordered found to have left popliteal DVT.  Started on heparin gtt.       PAST MEDICAL & SURGICAL HISTORY:  HTN (hypertension)  DM (diabetes mellitus)  Pacemaker      Allergies  No Known Allergies      FAMILY HISTORY:  Unknown       Social History:   Lives in 56yo+ community alone      Review of Systems:  Unable to obtain 2/2 intubated      Physical Examination:    General: Elderly female, lying in bed, unresponsive       HEENT: NC/AT, Pupils equal, reactive to light.  Symmetric. ETT in place.     PULM: Symmetrical thorax expansion upon respiration.  Clear to auscultation bilaterally, no significant sputum production    CVS: Regular rate and rhythm, no murmurs, rubs, or gallops appreciated    ABD: Soft, nondistended, nontender, normoactive bowel sounds, no masses appreciated    EXT: No edema, nontender    SKIN: Warm and well perfused, no rashes noted.    NEURO: Intubated, unresponsive       Medications:  meropenem  IVPB 1000 milliGRAM(s) IV Intermittent every 12 hours  vancomycin  IVPB      vancomycin  IVPB 500 milliGRAM(s) IV Intermittent every 12 hours  metoprolol tartrate 25 milliGRAM(s) Enteral Tube every 6 hours  albuterol/ipratropium for Nebulization. 3 milliLiter(s) Nebulizer every 6 hours PRN  acetaminophen    Suspension .. 650 milliGRAM(s) Oral every 6 hours PRN  acetaminophen   Tablet .. 650 milliGRAM(s) Oral every 6 hours PRN  fentaNYL    Injectable 50 MICROGram(s) IV Push every 2 hours PRN  levETIRAcetam  IVPB 500 milliGRAM(s) IV Intermittent every 12 hours  heparin   Injectable 5500 Unit(s) IV Push once  heparin   Injectable 5500 Unit(s) IV Push every 6 hours PRN  heparin   Injectable 2500 Unit(s) IV Push every 6 hours PRN  heparin  Infusion.  Unit(s)/Hr IV Continuous <Continuous>  pantoprazole  Injectable 40 milliGRAM(s) IV Push daily  dextrose 40% Gel 15 Gram(s) Oral once  dextrose 50% Injectable 50 milliLiter(s) IV Push every 15 minutes  glucagon  Injectable 1 milliGRAM(s) IntraMuscular once  hydrocortisone sodium succinate Injectable 50 milliGRAM(s) IV Push every 6 hours  insulin glargine Injectable (LANTUS) 50 Unit(s) SubCutaneous at bedtime  insulin lispro (ADMELOG) corrective regimen sliding scale   SubCutaneous every 6 hours  propylthiouracil 200 milliGRAM(s) Oral every 8 hours  dextrose 5%. 1000 milliLiter(s) IV Continuous <Continuous>  dextrose 5%. 1000 milliLiter(s) IV Continuous <Continuous>  potassium iodide 10%/iodine 5% Oral Liquid - Peds 0.25 milliLiter(s) Oral every 6 hours  sodium chloride 0.9% lock flush 10 milliLiter(s) IV Push every 1 hour PRN  chlorhexidine 0.12% Liquid 15 milliLiter(s) Oral Mucosa every 12 hours      Mode: AC/ CMV (Assist Control/ Continuous Mandatory Ventilation)  RR (machine): 20  TV (machine): 380  FiO2: 30  PEEP: 5  MAP: 11  PIP: 26      ICU Vital Signs Last 24 Hrs  T(C): 38.2 (2020 03:00), Max: 38.5 (:19)  T(F): 100.8 (2020 03:00), Max: 101.3 (:19)  HR: 85 (2020 03:00) (80 - 138)  BP: 161/85 (2020 03:00) (106/64 - 175/74)  BP(mean): 108 (2020 03:00) (79 - 111)  ABP: --  ABP(mean): --  RR: 25 (2020 03:00) (19 - 36)  SpO2: 96% (2020 03:00) (94% - 96%)    Vital Signs Last 24 Hrs  T(C): 38.2 (2020 03:00), Max: 38.5 (:19)  T(F): 100.8 (2020 03:00), Max: 101.3 (:19)  HR: 85 (2020 03:00) (80 - 138)  BP: 161/85 (2020 03:00) (106/64 - 175/74)  BP(mean): 108 (2020 03:00) (79 - 111)  RR: 25 (2020 03:00) (19 - 36)  SpO2: 96% (2020 03:00) (94% - 96%)    ABG - ( 2020 12:25 )  pH, Arterial: 7.51  pH, Blood: x     /  pCO2: 37    /  pO2: 195   / HCO3: 30    / Base Excess: 6.2   /  SaO2: 99          I&O's Detail    2020 07:01  -  2020 07:00  --------------------------------------------------------  IN:    Enteral Tube Flush: 250 mL    Esmolol: 496.9 mL    Glucerna: 960 mL    Insulin: 57 mL    IV PiggyBack: 50 mL    multiple electrolytes Injection Type 1.: 1200 mL    Norepinephrine: 3.6 mL    Oral Fluid: 500 mL  Total IN: 3517.5 mL    OUT:    Indwelling Catheter - Urethral (mL): 2195 mL  Total OUT: 2195 mL  Total NET: 1322.5 mL      2020 07:01  -  2020 03:18  --------------------------------------------------------  IN:    Enteral Tube Flush: 1245 mL    Esmolol: 139.5 mL    Glucerna: 800 mL    IV PiggyBack: 100 mL    IV PiggyBack: 150 mL    IV PiggyBack: 100 mL    IV PiggyBack: 50 mL    multiple electrolytes Injection Type 1.: 225 mL    Oral Fluid: 60 mL  Total IN: 2869.5 mL    OUT:    Indwelling Catheter - Urethral (mL): 2325 mL  Total OUT: 2325 mL  Total NET: 544.5 mL      LABS:                        7.8    20.16 )-----------( 92       ( 2020 03:11 )             25.6     11-18    142  |  104  |  88.0<H>  ----------------------------<  136<H>  3.7   |  27.0  |  1.36<H>    Ca    9.0      2020 03:11  Phos  3.3       Mg     3.0         TPro  5.6<L>  /  Alb  2.6<L>  /  TBili  3.8<H>  /  DBili  2.7<H>  /  AST  1330<H>  /  ALT  1234<H>  /  AlkPhos  99        CAPILLARY BLOOD GLUCOSE  POCT Blood Glucose.: 288 mg/dL (2020 22:49)      PT/INR - ( 2020 10:37 )   PT: 22.1 sec;   INR: 1.97 ratio         Urinalysis Basic - ( 2020 10:38 )  Color: Yellow / Appearance: Clear / S.010 / pH: x  Gluc: x / Ketone: Negative  / Bili: Negative / Urobili: Negative mg/dL   Blood: x / Protein: 30 mg/dL / Nitrite: Negative   Leuk Esterase: Negative / RBC: 25-50 /HPF / WBC 0-2   Sq Epi: x / Non Sq Epi: Occasional / Bacteria: Few      CULTURES:  Culture Results:   GI PCR Results: NOT detected  *******Please Note:*******  GI panel PCR evaluates for:  Campylobacter, Plesiomonas shigelloides, Salmonella,  Vibrio, Yersinia enterocolitica, Enteroaggregative  Escherichia coli (EAEC), Enteropathogenic E.coli (EPEC),  Enterotoxigenic E. coli (ETEC) lt/st, Shiga-like  toxin-producing E. coli (STEC) stx1/stx2,  Shigella/ Enteroinvasive E. coli (EIEC), Cryptosporidium,  Cyclospora cayetanensis, Entamoeba histolytica,  Giardia lamblia, Adenovirus F 40/41, Astrovirus,  Norovirus GI/GII, Rotavirus A, Sapovirus ( @ 15:37)  C Diff by PCR Result: NotDetec ( @ 03:11)  Culture Results:   Normal Respiratory Priscilla present ( @ 22:03)  Culture Results:   >=3 organisms. Probable collection contamination. (11-15 @ 01:27)  Culture Results:   No growth at 48 hours ( @ 21:42)  Culture Results:   No growth at 48 hours ( @ 21:41)      RADIOLOGY:   < from: US Duplex Venous Lower Ext Complete, Bilateral (20 @ 02:03) >   EXAM:  US DPLX LWR EXT VEINS COMPL BI                          PROCEDURE DATE:  2020      INTERPRETATION:  CLINICAL INFORMATION: Patient is on ventilator. Tachycardia. Hypoxia.    COMPARISON: None available.    TECHNIQUE: Duplex sonography of the BILATERAL LOWER extremity veins with color and spectral Doppler, with and without compression.    FINDINGS:    There is occlusive thrombus seen within the left popliteal vein.    Otherwise, there is normal compressibility, color and spectralDoppler of the bilateral common femoral, bilateral femoral and right popliteal veins.    Posterior tibial veins are patent bilaterally. Bilateral peroneal veins are not visualized.    There is a 3.4 x 1.4 x 2.1 cm left popliteal cyst.    IMPRESSION:  1. Positive for occlusive deep venous thrombosis in the left popliteal vein.    TEOFILO Marks notified on 2020 at 3:05 AM Eastern time.    ABIODUN MURRAY MD; Attending Radiologist  This document has been electronically signed.   3:08AM    < end of copied text >    < from: CT Head No Cont (20 @ 09:52) >   EXAM:  CT BRAIN                          PROCEDURE DATE:  2020      INTERPRETATION:  .    CLINICAL INFORMATION: Obtundation; initial CT yesterday without any acute changes, eval for delayed changes. Alteration of consciousness.    TECHNIQUE: Multiple axial CT images of the head were obtained without contrast. Sagittal and coronal reconstructed images were acquired from the source data.    COMPARISON: Most recent prior head CT study from 11/15/2020.    FINDINGS: There is no acute intracranial hemorrhage, mass effect, shift of the midline structures, herniation, extra-axial fluid collection, or hydrocephalus.    There is diffuse cerebral volume loss with prominence of the sulci, fissures, and cisternal spaces which is normal for the patient's age. There is moderate patchy confluent deep and periventricular white matter hypoattenuation statistically compatible with microvascular changes given calcific atherosclerotic disease of the intracranial arteries.    The paranasal sinusesand mastoid air cells are clear. The calvarium is intact. The imaged orbits are unremarkable.    IMPRESSION: No acute intracranial hemorrhage, mass effect, or shift of the midline structures.    Similar-appearing moderate severity chronic white matter microvascular type changes.    LIAM SEGURA MD; Attending Radiologist  This document has been electronically signed. 2020 10:04AM    < end of copied text >      SUPPLEMENTAL O2: AC/VC  LINES: Peripheral  IVF: N  PANG: Y  PPx: PPI, Heparin VTE ppx  CONTACT: N Patient is a 80y old  Female who presents with a chief complaint of AMS (2020 16:59)      BRIEF HOSPITAL COURSE:   79 yo f pmhx DM2, HTN, PPM and hyperthyroidism presented s/p frequent falls/AMS at home admitted to MICU with thyroid storm.      11/15: developed worsening hypoxic respiratory failure.     Events last 24 hours:   Worsening hypoxia this evening despite being bagged, CXR appears improved, LE dopplers ordered found to have left popliteal DVT.  Started on heparin gtt.     ADDENDUM:   AM labs revealing worsening anemia hgb 6.5 this am, called family for blood consent, no answer but message left, holding heparin gtt and vte ppx as of now.  Glucose continuing to climb, started on insulin gtt.       PAST MEDICAL & SURGICAL HISTORY:  HTN (hypertension)  DM (diabetes mellitus)  Pacemaker      Allergies  No Known Allergies      FAMILY HISTORY:  Unknown       Social History:   Lives in 56yo+ community alone      Review of Systems:  Unable to obtain 2/2 intubated      Physical Examination:    General: Elderly female, lying in bed, unresponsive       HEENT: NC/AT, Pupils equal, reactive to light.  Symmetric. ETT in place.     PULM: Symmetrical thorax expansion upon respiration.  Clear to auscultation bilaterally, no significant sputum production    CVS: Regular rate and rhythm, no murmurs, rubs, or gallops appreciated    ABD: Soft, nondistended, nontender, normoactive bowel sounds, no masses appreciated    EXT: No edema, nontender    SKIN: Warm and well perfused, no rashes noted.    NEURO: Intubated, unresponsive       Medications:  meropenem  IVPB 1000 milliGRAM(s) IV Intermittent every 12 hours  vancomycin  IVPB      vancomycin  IVPB 500 milliGRAM(s) IV Intermittent every 12 hours  metoprolol tartrate 25 milliGRAM(s) Enteral Tube every 6 hours  albuterol/ipratropium for Nebulization. 3 milliLiter(s) Nebulizer every 6 hours PRN  acetaminophen    Suspension .. 650 milliGRAM(s) Oral every 6 hours PRN  acetaminophen   Tablet .. 650 milliGRAM(s) Oral every 6 hours PRN  fentaNYL    Injectable 50 MICROGram(s) IV Push every 2 hours PRN  levETIRAcetam  IVPB 500 milliGRAM(s) IV Intermittent every 12 hours  heparin   Injectable 5500 Unit(s) IV Push once  heparin   Injectable 5500 Unit(s) IV Push every 6 hours PRN  heparin   Injectable 2500 Unit(s) IV Push every 6 hours PRN  heparin  Infusion.  Unit(s)/Hr IV Continuous <Continuous>  pantoprazole  Injectable 40 milliGRAM(s) IV Push daily  dextrose 40% Gel 15 Gram(s) Oral once  dextrose 50% Injectable 50 milliLiter(s) IV Push every 15 minutes  glucagon  Injectable 1 milliGRAM(s) IntraMuscular once  hydrocortisone sodium succinate Injectable 50 milliGRAM(s) IV Push every 6 hours  insulin glargine Injectable (LANTUS) 50 Unit(s) SubCutaneous at bedtime  insulin lispro (ADMELOG) corrective regimen sliding scale   SubCutaneous every 6 hours  propylthiouracil 200 milliGRAM(s) Oral every 8 hours  dextrose 5%. 1000 milliLiter(s) IV Continuous <Continuous>  dextrose 5%. 1000 milliLiter(s) IV Continuous <Continuous>  potassium iodide 10%/iodine 5% Oral Liquid - Peds 0.25 milliLiter(s) Oral every 6 hours  sodium chloride 0.9% lock flush 10 milliLiter(s) IV Push every 1 hour PRN  chlorhexidine 0.12% Liquid 15 milliLiter(s) Oral Mucosa every 12 hours      Mode: AC/ CMV (Assist Control/ Continuous Mandatory Ventilation)  RR (machine): 20  TV (machine): 380  FiO2: 30  PEEP: 5  MAP: 11  PIP: 26      ICU Vital Signs Last 24 Hrs  T(C): 38.2 (2020 03:00), Max: 38.5 (2020 20:19)  T(F): 100.8 (2020 03:00), Max: 101.3 (2020 20:19)  HR: 85 (2020 03:00) (80 - 138)  BP: 161/85 (2020 03:00) (106/64 - 175/74)  BP(mean): 108 (2020 03:00) (79 - 111)  ABP: --  ABP(mean): --  RR: 25 (2020 03:00) (19 - 36)  SpO2: 96% (2020 03:00) (94% - 96%)    Vital Signs Last 24 Hrs  T(C): 38.2 (2020 03:00), Max: 38.5 (2020 20:19)  T(F): 100.8 (2020 03:00), Max: 101.3 (2020 20:19)  HR: 85 (2020 03:00) (80 - 138)  BP: 161/85 (2020 03:00) (106/64 - 175/74)  BP(mean): 108 (2020 03:00) (79 - 111)  RR: 25 (2020 03:00) (19 - 36)  SpO2: 96% (2020 03:00) (94% - 96%)    ABG - ( 2020 12:25 )  pH, Arterial: 7.51  pH, Blood: x     /  pCO2: 37    /  pO2: 195   / HCO3: 30    / Base Excess: 6.2   /  SaO2: 99          I&O's Detail    2020 07:01  -  2020 07:00  --------------------------------------------------------  IN:    Enteral Tube Flush: 250 mL    Esmolol: 496.9 mL    Glucerna: 960 mL    Insulin: 57 mL    IV PiggyBack: 50 mL    multiple electrolytes Injection Type 1.: 1200 mL    Norepinephrine: 3.6 mL    Oral Fluid: 500 mL  Total IN: 3517.5 mL    OUT:    Indwelling Catheter - Urethral (mL): 2195 mL  Total OUT: 2195 mL  Total NET: 1322.5 mL      2020 07:01  -  2020 03:18  --------------------------------------------------------  IN:    Enteral Tube Flush: 1245 mL    Esmolol: 139.5 mL    Glucerna: 800 mL    IV PiggyBack: 100 mL    IV PiggyBack: 150 mL    IV PiggyBack: 100 mL    IV PiggyBack: 50 mL    multiple electrolytes Injection Type 1.: 225 mL    Oral Fluid: 60 mL  Total IN: 2869.5 mL    OUT:    Indwelling Catheter - Urethral (mL): 2325 mL  Total OUT: 2325 mL  Total NET: 544.5 mL      LABS:                        7.8    20.16 )-----------( 92       ( 2020 03:11 )             25.6     18    142  |  104  |  88.0<H>  ----------------------------<  136<H>  3.7   |  27.0  |  1.36<H>    Ca    9.0      2020 03:11  Phos  3.3       Mg     3.0         TPro  5.6<L>  /  Alb  2.6<L>  /  TBili  3.8<H>  /  DBili  2.7<H>  /  AST  1330<H>  /  ALT  1234<H>  /  AlkPhos  99        CAPILLARY BLOOD GLUCOSE  POCT Blood Glucose.: 288 mg/dL (2020 22:49)      PT/INR - ( 2020 10:37 )   PT: 22.1 sec;   INR: 1.97 ratio         Urinalysis Basic - ( 2020 10:38 )  Color: Yellow / Appearance: Clear / S.010 / pH: x  Gluc: x / Ketone: Negative  / Bili: Negative / Urobili: Negative mg/dL   Blood: x / Protein: 30 mg/dL / Nitrite: Negative   Leuk Esterase: Negative / RBC: 25-50 /HPF / WBC 0-2   Sq Epi: x / Non Sq Epi: Occasional / Bacteria: Few      CULTURES:  Culture Results:   GI PCR Results: NOT detected  *******Please Note:*******  GI panel PCR evaluates for:  Campylobacter, Plesiomonas shigelloides, Salmonella,  Vibrio, Yersinia enterocolitica, Enteroaggregative  Escherichia coli (EAEC), Enteropathogenic E.coli (EPEC),  Enterotoxigenic E. coli (ETEC) lt/st, Shiga-like  toxin-producing E. coli (STEC) stx1/stx2,  Shigella/ Enteroinvasive E. coli (EIEC), Cryptosporidium,  Cyclospora cayetanensis, Entamoeba histolytica,  Giardia lamblia, Adenovirus F 40/41, Astrovirus,  Norovirus GI/GII, Rotavirus A, Sapovirus ( @ 15:37)  C Diff by PCR Result: NotDetec ( @ 03:11)  Culture Results:   Normal Respiratory Priscilla present ( @ 22:03)  Culture Results:   >=3 organisms. Probable collection contamination. (11-15 @ 01:27)  Culture Results:   No growth at 48 hours ( @ 21:42)  Culture Results:   No growth at 48 hours ( @ 21:41)      RADIOLOGY:   < from: US Duplex Venous Lower Ext Complete, Bilateral (20 @ 02:03) >   EXAM:  US DPLX LWR EXT VEINS COMPL BI                          PROCEDURE DATE:  2020      INTERPRETATION:  CLINICAL INFORMATION: Patient is on ventilator. Tachycardia. Hypoxia.    COMPARISON: None available.    TECHNIQUE: Duplex sonography of the BILATERAL LOWER extremity veins with color and spectral Doppler, with and without compression.    FINDINGS:    There is occlusive thrombus seen within the left popliteal vein.    Otherwise, there is normal compressibility, color and spectralDoppler of the bilateral common femoral, bilateral femoral and right popliteal veins.    Posterior tibial veins are patent bilaterally. Bilateral peroneal veins are not visualized.    There is a 3.4 x 1.4 x 2.1 cm left popliteal cyst.    IMPRESSION:  1. Positive for occlusive deep venous thrombosis in the left popliteal vein.    TEOFILO Marks notified on 2020 at 3:05 AM Eastern time.    ABIODUN MURRAY MD; Attending Radiologist  This document has been electronically signed.   3:08AM    < end of copied text >    < from: CT Head No Cont (20 @ 09:52) >   EXAM:  CT BRAIN                          PROCEDURE DATE:  2020      INTERPRETATION:  .    CLINICAL INFORMATION: Obtundation; initial CT yesterday without any acute changes, eval for delayed changes. Alteration of consciousness.    TECHNIQUE: Multiple axial CT images of the head were obtained without contrast. Sagittal and coronal reconstructed images were acquired from the source data.    COMPARISON: Most recent prior head CT study from 11/15/2020.    FINDINGS: There is no acute intracranial hemorrhage, mass effect, shift of the midline structures, herniation, extra-axial fluid collection, or hydrocephalus.    There is diffuse cerebral volume loss with prominence of the sulci, fissures, and cisternal spaces which is normal for the patient's age. There is moderate patchy confluent deep and periventricular white matter hypoattenuation statistically compatible with microvascular changes given calcific atherosclerotic disease of the intracranial arteries.    The paranasal sinusesand mastoid air cells are clear. The calvarium is intact. The imaged orbits are unremarkable.    IMPRESSION: No acute intracranial hemorrhage, mass effect, or shift of the midline structures.    Similar-appearing moderate severity chronic white matter microvascular type changes.    LIAM SEGURA MD; Attending Radiologist  This document has been electronically signed. 2020 10:04AM    < end of copied text >      SUPPLEMENTAL O2: AC/VC  LINES: Peripheral  IVF: N  PANG: Y  PPx: PPI, Heparin VTE ppx  CONTACT: N

## 2020-11-18 NOTE — PROGRESS NOTE ADULT - ASSESSMENT
T4 improved to 12, T3 in the normal range as well. Good response to therapy of thyroid storm. Reasonable to taper and discontinue hydrocortisone. WOuld continue PTU and SSKI for now. Diabetes controlled

## 2020-11-18 NOTE — PROGRESS NOTE ADULT - SUBJECTIVE AND OBJECTIVE BOX
[facility]                                   [address] [place] [address]                                                      PHONE: [phone]                                                         FAX: [phone]  [email or web address]  ---------------------------------------------------------------------------------------------------------------------------------    Overnight events/patient complaints:  NAD on vent 's     No Known Allergies    MEDICATIONS  (STANDING):  chlorhexidine 0.12% Liquid 15 milliLiter(s) Oral Mucosa every 12 hours  dextrose 40% Gel 15 Gram(s) Oral once  dextrose 5%. 1000 milliLiter(s) (50 mL/Hr) IV Continuous <Continuous>  dextrose 5%. 1000 milliLiter(s) (100 mL/Hr) IV Continuous <Continuous>  dextrose 50% Injectable 50 milliLiter(s) IV Push every 15 minutes  esMOLOL  Infusion 50 MICROgram(s)/kG/Min (19.7 mL/Hr) IV Continuous <Continuous>  glucagon  Injectable 1 milliGRAM(s) IntraMuscular once  heparin   Injectable 5000 Unit(s) SubCutaneous every 12 hours  hydrocortisone sodium succinate Injectable 50 milliGRAM(s) IV Push every 6 hours  insulin glargine Injectable (LANTUS) 50 Unit(s) SubCutaneous at bedtime  insulin lispro (ADMELOG) corrective regimen sliding scale   SubCutaneous every 6 hours  levETIRAcetam  IVPB 500 milliGRAM(s) IV Intermittent every 12 hours  multiple electrolytes Injection Type 1 1000 milliLiter(s) (75 mL/Hr) IV Continuous <Continuous>  pantoprazole  Injectable 40 milliGRAM(s) IV Push daily  piperacillin/tazobactam IVPB.. 3.375 Gram(s) IV Intermittent every 12 hours  potassium iodide 10%/iodine 5% Oral Liquid - Peds 0.25 milliLiter(s) Oral every 6 hours  propylthiouracil 200 milliGRAM(s) Oral every 8 hours    MEDICATIONS  (PRN):  acetaminophen   Tablet .. 650 milliGRAM(s) Oral every 6 hours PRN Temp greater or equal to 38C (100.4F)  albuterol/ipratropium for Nebulization. 3 milliLiter(s) Nebulizer every 6 hours PRN Bronchospasm  fentaNYL    Injectable 50 MICROGram(s) IV Push every 2 hours PRN vent synchrony/pain  sodium chloride 0.9% lock flush 10 milliLiter(s) IV Push every 1 hour PRN Pre/post blood products, medications, blood draw, and to maintain line patency      Vital Signs Last 24 Hrs  T(C): 37.5 (2020 08:00), Max: 38.3 (2020 11:07)  T(F): 99.5 (2020 08:00), Max: 100.9 (2020 11:07)  HR: 118 (2020 09:15) (100 - 130)  BP: 157/67 (2020 08:45) (100/59 - 175/74)  BP(mean): 96 (2020 08:45) (71 - 122)  RR: 36 (2020 08:45) (26 - 36)  SpO2: 96% (2020 09:15) (91% - 96%)  ICU Vital Signs Last 24 Hrs  SAULO LEROY  I&O's Detail    2020 07:01  -  2020 07:00  --------------------------------------------------------  IN:    Enteral Tube Flush: 250 mL    Esmolol: 496.9 mL    Glucerna: 960 mL    Insulin: 57 mL    IV PiggyBack: 50 mL    multiple electrolytes Injection Type 1.: 1200 mL    Norepinephrine: 3.6 mL    Oral Fluid: 500 mL  Total IN: 3517.5 mL    OUT:    Indwelling Catheter - Urethral (mL): 2195 mL  Total OUT: 2195 mL    Total NET: 1322.5 mL      2020 07:01  -  2020 09:23  --------------------------------------------------------  IN:    Enteral Tube Flush: 250 mL    Esmolol: 39.5 mL    Glucerna: 80 mL    IV PiggyBack: 25 mL    multiple electrolytes Injection Type 1.: 75 mL  Total IN: 469.5 mL    OUT:    Indwelling Catheter - Urethral (mL): 350 mL  Total OUT: 350 mL    Total NET: 119.5 mL        I&O's Summary    2020 07:  -  2020 07:00  --------------------------------------------------------  IN: 3517.5 mL / OUT: 2195 mL / NET: 1322.5 mL    2020 07:  -  2020 09:23  --------------------------------------------------------  IN: 469.5 mL / OUT: 350 mL / NET: 119.5 mL      Drug Dosing Shanda LEROY  Mode: AC/ CMV (Assist Control/ Continuous Mandatory Ventilation), RR (machine): 20, TV (machine): 380, FiO2: 50, PEEP: 5, MAP: 11, PIP: 27    PHYSICAL EXAM:  General: Appears well developed, well nourished alert and cooperative.  HEENT: Head; normocephalic, atraumatic.  Eyes: Pupils reactive, cornea wnl.  Neck: Supple, no nodes adenopathy, no NVD or carotid bruit or thyromegaly.  CARDIOVASCULAR: Normal S1 and S2, 1/6 murmur, rub, gallop or lift.   LUNGS: Decrease BS B/L   ABDOMEN: Soft, nontender without mass or organomegaly. bowel sounds normoactive.  EXTREMITIES: No clubbing, cyanosis or edema. Distal pulses wnl.   SKIN: warm and dry with normal turgor.  NEURO: NAD on vent not sedated         LABS:                        7.8    20.16 )-----------( 92       ( 2020 03:11 )             25.6     11-18    142  |  104  |  88.0<H>  ----------------------------<  136<H>  3.7   |  27.0  |  1.36<H>    Ca    9.0      2020 03:11  Phos  3.3     11-  Mg     3.0     -18    TPro  5.6<L>  /  Alb  2.6<L>  /  TBili  3.8<H>  /  DBili  2.7<H>  /  AST  1330<H>  /  ALT  1234<H>  /  AlkPhos  99      SAULO LEROY      PT/INR - ( 2020 10:37 )   PT: 22.1 sec;   INR: 1.97 ratio         PTT - ( 2020 12:45 )  PTT:30.4 sec  Urinalysis Basic - ( 2020 10:38 )    Color: Yellow / Appearance: Clear / S.010 / pH: x  Gluc: x / Ketone: Negative  / Bili: Negative / Urobili: Negative mg/dL   Blood: x / Protein: 30 mg/dL / Nitrite: Negative   Leuk Esterase: Negative / RBC: 25-50 /HPF / WBC 0-2   Sq Epi: x / Non Sq Epi: Occasional / Bacteria: Few        RADIOLOGY & ADDITIONAL STUDIES:    INTERPRETATION OF TELEMETRY (personally reviewed): HR 's       INTERPRETATION OF TELEMETRY (personally reviewed): AF mild RVR     ECG:  Diagnosis Line Atrial fibrillation with rapid ventricular response with occasional ventricular-paced complexes  Left axis deviation  Minimal voltage criteria for LVH, may be normal variant  Marked ST abnormality, possible lateral subendocardial injury  Abnormal ECG    ECHO:  Summary:   1. Left ventricular ejection fraction, by visual estimation, is >75%.   2. Hyperdynamic global left ventricular systolic function.   3. Severely enlarged right atrium.   4. The mitral in-flow pattern reveals no discernable A-wave, therefore no comment on diastolic function can be made.   5. There is mild concentric left ventricular hypertrophy.   6. Moderately enlarged left atrium.   7. Mild mitral annular calcification.   8. Mild mitral valve regurgitation.   9. Thickening and calcification of the anterior and posterior mitral valve leaflets.  10. Moderate tricuspid regurgitation.  11. Sclerotic aortic valve with normal opening.  12. Dilateed Asc Ao at 3.8 cm. Dilated Ao root at 3.8cm.  13. Estimated pulmonary artery systolic pressure is 37.8 mmHg assuming a right atrial pressure of 8 mmHg, which is consistent with borderline pulmonary hypertension.    MD Angélica Electronically signed on 2020 at 10:09:29 AM      STRESS TEST: 3/2019 normal perfusion     CARDIAC CATHETERIZATION:  none obstructive CAD     Assessment and Plan:  In summary, SAULO LEROY is an 80y Female with past medical history significant for 80y Female with history of DM HTN HLD none obstructive CAD prior cath  but Nuclear stress test  normal perfusion SSS s/p PPM who presents to ED AMS and frequent falls at home.  The patient was found have thyroid storm and was admitted to MICU for acute respiratory failure likely due to ASP PNA.  Severe sepsis VASHTI and shock liver.  The patient was found to have new onset AF RVR and currently on esmolol gtt and off Levophed.  's and 's.  History was obtained from chart since patient intubated.  TTE hyperdynamic LV EF > 75%.  Not a good candidate for long term AC due to bleeding and fall risk.  Patient is DNR; HR 's with stable SBP      Plan  1.  Agree Esmolol gtt   2.  Agree with IVF and monitor renal panel   3.  Abx as per ID  4.  Start Lopressor 25 mg po q 6 hours  and wean off esmolol if possible       Care as per MICU team

## 2020-11-18 NOTE — PROGRESS NOTE ADULT - SUBJECTIVE AND OBJECTIVE BOX
[facility]  INFECTIOUS DISEASES AND INTERNAL MEDICINE at [place]  =======================================================  Willam Luu MD  Diplomates American Board of Internal Medicine and Infectious Diseases  Tel  [phone]  Fax [phone]  =======================================================    [id number]  SAULO LEROY  follow up:  lung infiltrates, fevers    remains intubated.   no new issues     =======================================================    REVIEW OF SYSTEMS:  Limited due to medical condition    =======================================================  Allergies  No Known Allergies     ======================================================  Physical Exam:  ============    General:  THIN FRAIL, sedated  Eye: Pupils are equal, round and reactive to light,  BILATERAL pterygium,  right WORSE THAN LEFT;  bilateral cataracts  HENT: Normocephalic, Oral mucosa is dry  ET Tube in place  Neck: Supple, No lymphadenopathy.  Respiratory: Lungs with fair air entry  Cardiovascular: Normal rate, Regular rhythm,   Gastrointestinal: Soft,  Non-distended, Normal bowel sounds.  Genitourinary: PANG with clear urine  Lymphatics: No lymphadenopathy neck,   Musculoskeletal: no joint abnl  Integumentary: No rash.  Neurologic: sedated, unable to assess    =======================================================   Vitals:  ============  T(F): 99.5 (18 Nov 2020 08:00), Max: 100.9 (17 Nov 2020 11:07)  HR: 118 (18 Nov 2020 09:15)  BP: 157/67 (18 Nov 2020 08:45)  RR: 36 (18 Nov 2020 08:45)  SpO2: 96% (18 Nov 2020 09:15) (91% - 96%)  temp max in last 48H T(F): , Max: 102.6 (11-16-20 @ 12:30)    =======================================================  Current Antibiotics:  piperacillin/tazobactam IVPB.. 3.375 Gram(s) IV Intermittent every 12 hours    Other medications:  chlorhexidine 0.12% Liquid 15 milliLiter(s) Oral Mucosa every 12 hours  dextrose 40% Gel 15 Gram(s) Oral once  dextrose 5%. 1000 milliLiter(s) IV Continuous <Continuous>  dextrose 5%. 1000 milliLiter(s) IV Continuous <Continuous>  dextrose 50% Injectable 50 milliLiter(s) IV Push every 15 minutes  esMOLOL  Infusion 50 MICROgram(s)/kG/Min IV Continuous <Continuous>  glucagon  Injectable 1 milliGRAM(s) IntraMuscular once  heparin   Injectable 5000 Unit(s) SubCutaneous every 12 hours  hydrocortisone sodium succinate Injectable 50 milliGRAM(s) IV Push every 6 hours  insulin glargine Injectable (LANTUS) 50 Unit(s) SubCutaneous at bedtime  insulin lispro (ADMELOG) corrective regimen sliding scale   SubCutaneous every 6 hours  levETIRAcetam  IVPB 500 milliGRAM(s) IV Intermittent every 12 hours  multiple electrolytes Injection Type 1 1000 milliLiter(s) IV Continuous <Continuous>  pantoprazole  Injectable 40 milliGRAM(s) IV Push daily  potassium iodide 10%/iodine 5% Oral Liquid - Peds 0.25 milliLiter(s) Oral every 6 hours  propylthiouracil 200 milliGRAM(s) Oral every 8 hours      =======================================================  Labs:                        7.8    20.16 )-----------( 92       ( 18 Nov 2020 03:11 )             25.6      11-18    142  |  104  |  88.0<H>  ----------------------------<  136<H>  3.7   |  27.0  |  1.36<H>    Ca    9.0      18 Nov 2020 03:11  Phos  3.3     11-18  Mg     3.0     11-18    TPro  5.6<L>  /  Alb  2.6<L>  /  TBili  3.8<H>  /  DBili  2.7<H>  /  AST  1330<H>  /  ALT  1234<H>  /  AlkPhos  99  11-17      Culture - Sputum (collected 11-17-20 @ 22:03)  Source: .Sputum Sputum  Gram Stain (11-17-20 @ 23:42):    Few polymorphonuclear leukocytes per low power field    No Squamous epithelial cells per low power field    Few Yeast like cells per oil power field    Culture - Urine (collected 11-15-20 @ 01:27)  Source: .Urine Clean Catch (Midstream)  Final Report (11-16-20 @ 19:28):    >=3 organisms. Probable collection contamination.    Culture - Blood (collected 11-14-20 @ 21:42)  Source: .Blood Blood-Peripheral    Culture - Blood (collected 11-14-20 @ 21:41)  Source: .Blood Blood-Peripheral      Creatinine, Serum: 1.36 mg/dL (11-18-20 @ 03:11)  Creatinine, Serum: 1.93 mg/dL (11-17-20 @ 04:36)  Creatinine, Serum: 1.93 mg/dL (11-16-20 @ 04:33)  Creatinine, Serum: 1.24 mg/dL (11-15-20 @ 14:09)  Creatinine, Serum: 0.68 mg/dL (11-15-20 @ 06:16)  Creatinine, Serum: 0.74 mg/dL (11-14-20 @ 21:30)    Procalcitonin, Serum: 51.48 ng/mL (11-17-20 @ 04:36)    WBC Count: 20.16 K/uL (11-18-20 @ 03:11)  WBC Count: 16.08 K/uL (11-17-20 @ 04:36)  WBC Count: 11.72 K/uL (11-16-20 @ 04:33)  WBC Count: 7.78 K/uL (11-15-20 @ 14:09)  WBC Count: 9.17 K/uL (11-15-20 @ 06:16)  WBC Count: 7.86 K/uL (11-14-20 @ 21:30)      COVID-19 IgG Antibody Interpretation: Negative (11-15-20 @ 08:21)  COVID-19 IgG Antibody Index: <0.10 Index (11-15-20 @ 08:21)  COVID-19 PCR: NotDetec (11-14-20 @ 22:00)      Alkaline Phosphatase, Serum: 99 U/L (11-17-20 @ 04:36)  Alkaline Phosphatase, Serum: 76 U/L (11-16-20 @ 04:33)  Alkaline Phosphatase, Serum: 79 U/L (11-15-20 @ 14:09)  Alkaline Phosphatase, Serum: 96 U/L (11-14-20 @ 21:30)  Alanine Aminotransferase (ALT/SGPT): 1234 U/L (11-17-20 @ 04:36)  Alanine Aminotransferase (ALT/SGPT): 1230 U/L (11-16-20 @ 04:33)  Alanine Aminotransferase (ALT/SGPT): 280 U/L (11-15-20 @ 14:09)  Alanine Aminotransferase (ALT/SGPT): 22 U/L (11-14-20 @ 21:30)  Aspartate Aminotransferase (AST/SGOT): 1330 U/L (11-17-20 @ 04:36)  Aspartate Aminotransferase (AST/SGOT): 4526 U/L (11-16-20 @ 04:33)  Aspartate Aminotransferase (AST/SGOT): 841 U/L (11-15-20 @ 14:09)  Aspartate Aminotransferase (AST/SGOT): 30 U/L (11-14-20 @ 21:30)  Bilirubin Total, Serum: 3.8 mg/dL (11-17-20 @ 04:36)  Bilirubin Total, Serum: 2.1 mg/dL (11-16-20 @ 04:33)  Bilirubin Total, Serum: 1.4 mg/dL (11-15-20 @ 14:09)  Bilirubin Total, Serum: 0.9 mg/dL (11-14-20 @ 21:30)  Bilirubin Direct, Serum: 2.7 mg/dL (11-17-20 @ 04:36)

## 2020-11-18 NOTE — PROGRESS NOTE ADULT - ASSESSMENT
81 yo f pmhx DM2, HTN, PPM and hyperthyroidism presented s/p frequent falls/AMS at home admitted to MICU with thyroid storm.      NEURO: Continued unresponsiveness, continue Keppra   CV: HTN/Afib on lopressor, Concern for possible PE, started on heparin gtt, markers ordered, DVT +  RESP: Hypoxic respiratory failure, ac/vc 6cc/kg tv lung protective strategy, actively titrating fio2 and peep for spo2 >92%.   RENAL: VASHTI, avoid nephrotoxic meds, renally dose meds, trend urine output, bun/cr and electrolytes. Hanna in place.   GI: NPO with TF, tolerating well.   ENDO: Glycemic control with Lantus, ISS increased   ID: continuing to spike fevers despite zosyn, escalated to meropenem and vancomycin.   HEME: Heparin gtt ordered for ac in setting of LE DVT, concern for PE however in setting of VASHTI will hold off on CTA for now.   DISPO: DNR. 81 yo f pmhx DM2, HTN, PPM and hyperthyroidism presented s/p frequent falls/AMS at home admitted to MICU with thyroid storm.      NEURO: Continued unresponsiveness, continue Keppra   CV: HTN/Afib on lopressor, Concern for possible PE, started on heparin gtt, markers ordered, DVT +  RESP: Hypoxic respiratory failure, ac/vc 6cc/kg tv lung protective strategy, actively titrating fio2 and peep for spo2 >92%.   RENAL: VASHTI, avoid nephrotoxic meds, renally dose meds, trend urine output, bun/cr and electrolytes. Hanna in place.   GI: NPO with TF, tolerating well.   ENDO: Glycemic control with Lantus, ISS increased now transitioned to insulin gtt due to glucose continuing to climb.  poct q1 hrs.    ID: continuing to spike fevers despite zosyn, escalated to meropenem and vancomycin.   HEME: Heparin gtt ordered for ac in setting of LE DVT, concern for PE however in setting of VASHTI will hold off on CTA for now  DISPO: DNR.       ADDENDUM:   ENDO: Patient's glucose climbing despite lantus and high dose ISS,  now transitioned to insulin gtt due to glucose continuing to climb.  poct q1 hrs.    HEME: Anemic to 6.5 this am, family called for consent, no answer, message left.  Type and screen sent, holding ac and vte ppx.  Fecal occult sent.  Will need ct abdomen to r/o occult source.

## 2020-11-18 NOTE — PROGRESS NOTE ADULT - SUBJECTIVE AND OBJECTIVE BOX
INTERVAL HPI/OVERNIGHT EVENTS:    MEDICATIONS  (STANDING):  chlorhexidine 0.12% Liquid 15 milliLiter(s) Oral Mucosa every 12 hours  dextrose 40% Gel 15 Gram(s) Oral once  dextrose 5%. 1000 milliLiter(s) (50 mL/Hr) IV Continuous <Continuous>  dextrose 5%. 1000 milliLiter(s) (100 mL/Hr) IV Continuous <Continuous>  dextrose 50% Injectable 50 milliLiter(s) IV Push every 15 minutes  glucagon  Injectable 1 milliGRAM(s) IntraMuscular once  heparin   Injectable 5000 Unit(s) SubCutaneous every 12 hours  hydrocortisone sodium succinate Injectable 50 milliGRAM(s) IV Push every 6 hours  insulin glargine Injectable (LANTUS) 50 Unit(s) SubCutaneous at bedtime  insulin lispro (ADMELOG) corrective regimen sliding scale   SubCutaneous every 6 hours  levETIRAcetam  IVPB 500 milliGRAM(s) IV Intermittent every 12 hours  metoprolol tartrate 25 milliGRAM(s) Enteral Tube every 6 hours  pantoprazole  Injectable 40 milliGRAM(s) IV Push daily  piperacillin/tazobactam IVPB.. 3.375 Gram(s) IV Intermittent every 12 hours  potassium iodide 10%/iodine 5% Oral Liquid - Peds 0.25 milliLiter(s) Oral every 6 hours  propylthiouracil 200 milliGRAM(s) Oral every 8 hours    MEDICATIONS  (PRN):  acetaminophen    Suspension .. 650 milliGRAM(s) Oral every 6 hours PRN Temp greater or equal to 38C (100.4F)  acetaminophen   Tablet .. 650 milliGRAM(s) Oral every 6 hours PRN Temp greater or equal to 38C (100.4F)  albuterol/ipratropium for Nebulization. 3 milliLiter(s) Nebulizer every 6 hours PRN Bronchospasm  fentaNYL    Injectable 50 MICROGram(s) IV Push every 2 hours PRN vent synchrony/pain  sodium chloride 0.9% lock flush 10 milliLiter(s) IV Push every 1 hour PRN Pre/post blood products, medications, blood draw, and to maintain line patency      Allergies    No Known Allergies    Intolerances        Vital Signs Last 24 Hrs  T(C): 38.3 (2020 17:00), Max: 38.4 (2020 16:00)  T(F): 100.9 (2020 17:00), Max: 101.1 (2020 16:00)  HR: 110 (2020 16:02) (100 - 130)  BP: 144/80 (2020 16:00) (106/58 - 175/74)  BP(mean): 99 (2020 16:00) (76 - 122)  RR: 32 (2020 16:00) (23 - 36)  SpO2: 94% (2020 16:02) (92% - 96%)        LABS:                        7.8    20.16 )-----------( 92       ( 2020 03:11 )             25.6     18    142  |  104  |  88.0<H>  ----------------------------<  136<H>  3.7   |  27.0  |  1.36<H>    Ca    9.0      2020 03:11  Phos  3.3       Mg     3.0         TPro  5.6<L>  /  Alb  2.6<L>  /  TBili  3.8<H>  /  DBili  2.7<H>  /  AST  1330<H>  /  ALT  1234<H>  /  AlkPhos  99  17    Urinalysis Basic - ( 2020 10:38 )    Color: Yellow / Appearance: Clear / S.010 / pH: x  Gluc: x / Ketone: Negative  / Bili: Negative / Urobili: Negative mg/dL   Blood: x / Protein: 30 mg/dL / Nitrite: Negative   Leuk Esterase: Negative / RBC: 25-50 /HPF / WBC 0-2   Sq Epi: x / Non Sq Epi: Occasional / Bacteria: Few          POCT Blood Glucose.: 171 mg/dL (20 @ 11:23)  POCT Blood Glucose.: 197 mg/dL (20 @ 06:26)  POCT Blood Glucose.: 161 mg/dL (20 @ 00:35)  POCT Blood Glucose.: 169 mg/dL (20 @ 22:41)  POCT Blood Glucose.: 147 mg/dL (20 @ 21:29)  POCT Blood Glucose.: 155 mg/dL (20 @ 20:07)  POCT Blood Glucose.: 170 mg/dL (20 @ 19:13)  POCT Blood Glucose.: 167 mg/dL (20 @ 18:18)  POCT Blood Glucose.: 147 mg/dL (20 @ 16:50)  POCT Blood Glucose.: 144 mg/dL (20 @ 16:06)  POCT Blood Glucose.: 157 mg/dL (20 @ 14:54)  POCT Blood Glucose.: 156 mg/dL (20 @ 14:01)  POCT Blood Glucose.: 104 mg/dL (20 @ 12:52)  POCT Blood Glucose.: 108 mg/dL (20 @ 12:35)  POCT Blood Glucose.: 121 mg/dL (20 @ 11:45)  POCT Blood Glucose.: 138 mg/dL (20 @ 10:35)  POCT Blood Glucose.: 163 mg/dL (20 @ 09:58)  POCT Blood Glucose.: 158 mg/dL (20 @ 08:53)  POCT Blood Glucose.: 134 mg/dL (20 @ 07:46)  POCT Blood Glucose.: 167 mg/dL (20 @ 06:36)  POCT Blood Glucose.: 161 mg/dL (20 @ 05:42)  POCT Blood Glucose.: 170 mg/dL (20 @ 04:18)  POCT Blood Glucose.: 162 mg/dL (20 @ 03:18)  POCT Blood Glucose.: 167 mg/dL (20 @ 02:06)  POCT Blood Glucose.: 156 mg/dL (20 @ 01:08)  POCT Blood Glucose.: 157 mg/dL (20 @ 00:09)  POCT Blood Glucose.: 149 mg/dL (20 @ 22:57)  POCT Blood Glucose.: 201 mg/dL (20 @ 21:18)  POCT Blood Glucose.: 214 mg/dL (20 @ 20:01)  POCT Blood Glucose.: 204 mg/dL (20 @ 19:00)  POCT Blood Glucose.: 216 mg/dL (20 @ 18:00)  POCT Blood Glucose.: 222 mg/dL (20 @ 17:17)  POCT Blood Glucose.: 239 mg/dL (20 @ 16:05)  POCT Blood Glucose.: 269 mg/dL (20 @ 15:04)  POCT Blood Glucose.: 289 mg/dL (20 @ 14:08)  POCT Blood Glucose.: 267 mg/dL (20 @ 13:02)  POCT Blood Glucose.: 234 mg/dL (20 @ 12:07)  POCT Blood Glucose.: 307 mg/dL (20 @ 11:11)  POCT Blood Glucose.: 363 mg/dL (20 @ 10:10)  POCT Blood Glucose.: 403 mg/dL (20 @ 08:54)  POCT Blood Glucose.: 347 mg/dL (20 @ 05:10)  POCT Blood Glucose.: 195 mg/dL (11-15-20 @ 22:54)  POCT Blood Glucose.: 85 mg/dL (11-15-20 @ 18:33)

## 2020-11-18 NOTE — PROGRESS NOTE ADULT - ATTENDING COMMENTS
80 female with hx of DM, HTN, hyperthyroidism, PPM, presented with falls, found to be in SVT likely due to thyrotoxicosis.  Developed acute respiratory failure requiring intubation after a witnessed aspiration event.  Now with aspiration pneumonia, septic shock.  Plan is for empiric abx, fluid resuscitation, wean vent settings.   Thyrotoxicosis seems to be under control.   Family updated this AM by JAMES RED.
80 female with hx of DM, HTN, hyperthyroidism, PPM, presented with falls, found to be in SVT likely due to thyrotoxicosis.  Developed acute respiratory failure requiring intubation after a witnessed aspiration event.  Now with acute respiratory failure, encephalopathy, aspiration pneumonia.  Unclear etiology of encephalopathy.  Unable to undergo MRI due to PPM.  EEG without seizures.  LP risky due to coagulopathy.
I saw this patient independently and agree with the above.    Fever workup occurring now; liquid stool with fever and leukocytosis and markedly elevated procalcitonin -- check for C Diff; also sending sputum, blood, and UA.    Appreciate ID evaluation and recommendations.    Appreciate endocrine recommendations.    See goals of care note for further details.
will update family this afternoon

## 2020-11-18 NOTE — PROGRESS NOTE ADULT - ASSESSMENT
This 80 year old female with a pmhx of PPM, DM, HTN, hyperthyroid who presents with AMS. Per daughter she brought patient in as she has been having frequent falls at home. Over the past few days she noticed patient becoming more confused. Patient had fall (11/13/2020) and was unable to get up and was then brought in to Ed. Upon arrival noted to be SVT, HTN. TSH undetectable and had high t3/4. MICU then consulted.  (14 Nov 2020 23:26)    he was found with acute respiratory failure with hypoxia. Per team, aspiration event noted as well.   Images showed  L perihilar area with patchy infiltrates > R perihilar.  empiric antibiotics were started.     patient remains intubated under MICU care.  Thyroid storm also being managed by the ICU    Impression:  - acute respiratory failure  - lung infiltrates  - thyroid storm  - WBC elevation  - acute renal failure  - transaminitis  - shock      Plan:  - continue empiric zosyn  - procalcitonin of 51  - BLOOD cultures from 11/14 x 2 are negative  blood cx from 11/17 x 2 sent and collected    - multiple labs abnormal from shock    Serum BNP markedly elevated  Liver enzymes elevated  - likely related to sepsis.   Acute renal failure  - will watch closely    - follow up all outstanding cultures  - trend temperature and WBC curve  - repeat cultures from blood and all sources if febrile

## 2020-11-18 NOTE — PROGRESS NOTE ADULT - ASSESSMENT
1. Acute metabolic encephalopathy  2. Acute hypoxic respiratory distress 2/2 aspiration pneumonitis? vs CAP  3. VASHTI  4. Thyroid storm   5. Transaminitis resolved    Plan:  NEURO: Intubated and off sedation, CPAP trial this am pt tachypneic 35-40. RAAS remains -5,  Avoid sedative medication.   D/C Continuous EEG- no active seizure, spoke with epileptologist  Continue keppra BID for prophylaxis   No LP at this time due to thrombocytopenia   Plan for MRI if cleared by EP     CV: Remains off pressors, maintain MAP of >65.   Transition from esmolol to PO metoprolol per cardio.   Taper stress dose steroids. EF >75% on echo     PULM: FiO2 currently 50%, wean FiO2 to keep SaO2>94%. Will increase peep if needed. Continue aggressive chest PT    GI/: Diet: Tube feeds (glucerna) at goal, tolerating well.   Continue wiggins for strict I/Os. UOP steady(75-200cc/hr), avoid nephrotoxic agents, renal dose medications, Trend BUN/Cr  Pt euvolemic, D/C maintenance fluids.    ID: afebrile overnight  Cdiff neg, sputum neg, UC neg  Blood cx: pending, GI PCR Stool: pending  Continue coverage for pna with zosyn     ENDO:   ISS with 50units of Lantus    Continue PTU & iodine for hyperthyroid, endo following     HEME:  Heparin SQ for VTE prop  SCD's in place 1. Acute metabolic encephalopathy  2. Acute hypoxic respiratory distress 2/2 aspiration pneumonitis? vs CAP  3. VASHTI  4. Thyroid storm   5. Transaminitis resolved    Plan:  NEURO: Intubated and off sedation, CPAP trial this am pt tachypneic 35-40. RAAS remains -5,  Avoid sedative medication.   D/C Continuous EEG- no active seizure, spoke with epileptologist  Continue keppra BID for prophylaxis   No LP at this time due to thrombocytopenia   Plan for MRI if cleared by EP/cardiology?     CV: Remains off pressors, maintain MAP of >65.   Transition from esmolol to PO metoprolol per cardio.   Taper stress dose steroids. EF >75% on echo     PULM: FiO2 currently 50%, wean FiO2 to keep SaO2>94%. Will increase peep if needed. Continue aggressive chest PT    GI/: Diet: Tube feeds (glucerna) at goal, tolerating well.   Continue wiggins for strict I/Os. UOP steady(75-200cc/hr), avoid nephrotoxic agents, renal dose medications, Trend BUN/Cr  Pt euvolemic, D/C maintenance fluids.    ID: afebrile overnight  Cdiff neg, sputum neg, UC neg  Blood cx: pending, GI PCR Stool: pending  Continue coverage for pna with zosyn     ENDO:   ISS with 50units of Lantus    Continue PTU & iodine for hyperthyroid, endo following     HEME:  Heparin SQ for VTE prop  SCD's in place 1. Acute metabolic encephalopathy  2. Acute hypoxic respiratory distress 2/2 aspiration pneumonitis? vs CAP  3. VASHTI  4. Thyroid storm   5. Transaminitis resolved    Plan:  NEURO: Intubated and off sedation, CPAP trial this am pt tachypneic 35-40. RAAS remains -5,  Avoid sedative medication.   D/C Continuous EEG- no active seizure, spoke with epileptologist  Continue keppra BID for prophylaxis   No LP at this time due to thrombocytopenia   Plan for MRI if cleared by EP/cardiology?     CV: Remains off pressors, maintain MAP of >65.   Transition from esmolol to PO metoprolol per cardio.   Taper stress dose steroids. EF >75% on echo     PULM: FiO2 currently 50%, wean FiO2 to keep SaO2>94%. Will increase peep if needed. Continue aggressive chest PT    GI/: Diet: Tube feeds (glucerna) at goal, tolerating well.   Continue wiggins for strict I/Os. UOP steady(75-200cc/hr), avoid nephrotoxic agents, renal dose medications, Trend BUN/Cr  Pt euvolemic, D/C maintenance fluids.    ID: afebrile overnight  Cdiff neg, sputum neg, UC neg  Blood cx: pending, GI PCR Stool: pending  Continue coverage for pna with zosyn     ENDO:   ISS with 50units of Lantus    Continue PTU & iodine for hyperthyroid, endo following     HEME:  Heparin SQ for VTE prop  SCD's in place     Update:   Spoke with [facility], MRI is not compatible with MRI 1. Acute metabolic encephalopathy  2. Acute hypoxic respiratory distress 2/2 aspiration pneumonitis? vs CAP  3. VASHTI  4. Thyroid storm   5. Transaminitis resolved    Plan:  NEURO: Intubated and off sedation, CPAP trial this am pt tachypneic 35-40. RAAS remains -5,  Avoid sedative medication.   D/C Continuous EEG- no active seizure, spoke with epileptologist  Continue keppra BID for prophylaxis   No LP at this time due to thrombocytopenia   Plan for MRI if cleared by EP/cardiology?     CV: Remains off pressors, maintain MAP of >65.   Transition from esmolol to PO metoprolol per cardio.   Taper stress dose steroids. EF >75% on echo     PULM: FiO2 currently 50%, wean FiO2 to keep SaO2>94%. Will increase peep if needed. Continue aggressive chest PT    GI/: Diet: Tube feeds (glucerna) at goal, tolerating well.   Continue wiggins for strict I/Os. UOP steady(75-200cc/hr), avoid nephrotoxic agents, renal dose medications, Trend BUN/Cr  Pt euvolemic, D/C maintenance fluids.    ID: afebrile overnight  Cdiff neg, sputum neg, UC neg  Blood cx: pending, GI PCR Stool: pending  Continue coverage for pna with zosyn     ENDO:   ISS with 50units of Lantus    Continue PTU & iodine for hyperthyroid, endo following     HEME:  Heparin SQ for VTE prop  SCD's in place     Update:   Spoke with [facility], PPM is not compatible with MRI

## 2020-11-18 NOTE — EEG REPORT - NS EEG TEXT BOX
[facility]   COMPREHENSIVE EPILEPSY CENTER   REPORT OF LONG-TERM VIDEO EEG     [facility]: [address], [place] [address], Ph#: [phone]  [facility]: [id number] [place] [address], Ph#: [phone]  [facility]: [address] [place] [address], Ph#: [phone]    Patient Name: SAULO LEROY  Age and : 80y ([birth year])  MRN #: [id number]  Location: [place] [address]  Referring Physician: Yonatan Tay    Start Time/Date: 08:00 on 20  End Time/Date: 08:00 on 20  Duration: 24hr    _____________________________________________________________  STUDY INFORMATION    EEG Recording Technique:  The patient underwent continuous Video-EEG monitoring, using Telemetry System hardware on the XLTek Digital System. EEG and video data were stored on a computer hard drive with important events saved in digital archive files. The material was reviewed by a physician (electroencephalographer / epileptologist) on a daily basis. Rafy and seizure detection algorithms were utilized and reviewed. An EEG Technician attended to the patient, and was available throughout daytime work hours.  The epilepsy center neurologist was available in person or on call 24-hours per day.    EEG Placement and Labeling of Electrodes:  The EEG was performed utilizing 20 channel referential EEG connections (coronal over temporal over parasagittal montage) using all standard 10-20 electrode placements with EKG, with additional electrodes placed in the inferior temporal region using the modified 10-10 montage electrode placements for elective admissions, or if deemed necessary. Recording was at a sampling rate of 256 samples per second per channel. Time synchronized digital video recording was done simultaneously with EEG recording. A low light infrared camera was used for low light recording.     _____________________________________________________________  HISTORY    Patient is a 80y old  Female who presents with a chief complaint of AMS (2020 08:20)      PERTINENT MEDICATION:  none    _____________________________________________________________  STUDY INTERPRETATION    Findings: The background was continuous, spontaneously variable and reactive. No posterior dominant rhythm seen.    Background Slowing:  Diffuse theta and polymorphic delta slowing.    Focal Slowing:   None were present.    Sleep Background:  Stage II sleep transients were not recorded.    Other Non-Epileptiform Findings:  None were present.    Interictal Epileptiform Activity:   - When stimulated, abundant, multifocal sharp wave discharges in the bilateral frontal (max Fp1, Fp2), bilateral temporal (max F7, F8), bilateral posterior quadrant (max O1/P7, O2/P8) regions, at times very broad in field.  - Rare stimulus-induced very brief, fluctuating 1-2 Hz bifrontal bilateral independent lateralized periodic discharges with rhythmicity (LPD+R).    Events:  Clinical events: None recorded.  Seizures: None recorded.    Activation Procedures:   Hyperventilation was not performed.    Photic stimulation was performed and did not elicit any abnormality.     Artifacts:  Intermittent myogenic and movement artifacts were noted.    ECG:  The heart rate on single channel ECG was predominantly between  BPM.    _____________________________________________________________  EEG SUMMARY/CLASSIFICATION    Abnormal EEG in an altered patient.  - When stimulated, abundant, multifocal sharp wave discharges in the bilateral frontal (max Fp1, Fp2), bilateral temporal (max F7, F8), bilateral posterior quadrant (max O1/P7, O2/P8) regions, at times very broad in field.  - Rare stimulus-induced very brief, fluctuating 1-2 Hz bifrontal bilateral independent lateralized periodic discharges with rhythmicity (LPD+R).  - Moderate to severe generalized slowing.    _____________________________________________________________  EEG IMPRESSION/CLINICAL CORRELATE    Abnormal EEG study.  1. Potential multifocal epileptogenic foci in the bilateral frontal, bilateral temporal and bilateral posterior quadrant regions.   2. Moderate to severe nonspecific diffuse or multifocal cerebral dysfunction.   3. No seizure seen.    _____________________________________________________________    Atif Canales MD  Attending Physician, [facility]     [facility]   COMPREHENSIVE EPILEPSY CENTER   REPORT OF LONG-TERM VIDEO EEG     [facility]: [address], [place] [address], Ph#: [phone]  [facility]: [id number], [place] [address], Ph#: [phone]  [facility]: [address] [place] [address], Ph#: [phone]    Patient Name: SAULO LEROY  Age and : 80y ([birth year])  MRN #: [id number]  Location: [place] [address]  Referring Physician: Yonatan Tay    Start Time/Date: 08:00 on 20  End Time/Date: 10:49 on 20  Duration: 26hr 49m    _____________________________________________________________  STUDY INFORMATION    EEG Recording Technique:  The patient underwent continuous Video-EEG monitoring, using Telemetry System hardware on the XLTek Digital System. EEG and video data were stored on a computer hard drive with important events saved in digital archive files. The material was reviewed by a physician (electroencephalographer / epileptologist) on a daily basis. Rafy and seizure detection algorithms were utilized and reviewed. An EEG Technician attended to the patient, and was available throughout daytime work hours.  The epilepsy center neurologist was available in person or on call 24-hours per day.    EEG Placement and Labeling of Electrodes:  The EEG was performed utilizing 20 channel referential EEG connections (coronal over temporal over parasagittal montage) using all standard 10-20 electrode placements with EKG, with additional electrodes placed in the inferior temporal region using the modified 10-10 montage electrode placements for elective admissions, or if deemed necessary. Recording was at a sampling rate of 256 samples per second per channel. Time synchronized digital video recording was done simultaneously with EEG recording. A low light infrared camera was used for low light recording.     _____________________________________________________________  HISTORY    Patient is a 80y old  Female who presents with a chief complaint of AMS (2020 08:20)      PERTINENT MEDICATION:  none    _____________________________________________________________  STUDY INTERPRETATION    Findings: The background was continuous, spontaneously variable and reactive. No posterior dominant rhythm seen.    Background Slowing:  Diffuse theta and polymorphic delta slowing.    Focal Slowing:   None were present.    Sleep Background:  Stage II sleep transients were not recorded.    Other Non-Epileptiform Findings:  None were present.    Interictal Epileptiform Activity:   - When stimulated, abundant, multifocal sharp wave discharges in the bilateral frontal (max Fp1, Fp2), bilateral temporal (max F7, F8), bilateral posterior quadrant (max O1/P7, O2/P8) regions, at times very broad in field.  - Rare stimulus-induced very brief, fluctuating 1-2 Hz bifrontal bilateral independent lateralized periodic discharges with rhythmicity (LPD+R).    Events:  Clinical events: None recorded.  Seizures: None recorded.    Activation Procedures:   Hyperventilation was not performed.    Photic stimulation was performed and did not elicit any abnormality.     Artifacts:  Intermittent myogenic and movement artifacts were noted.    ECG:  The heart rate on single channel ECG was predominantly between  BPM.    _____________________________________________________________  EEG SUMMARY/CLASSIFICATION    Abnormal EEG in an altered patient.  - When stimulated, abundant, multifocal sharp wave discharges in the bilateral frontal (max Fp1, Fp2), bilateral temporal (max F7, F8), bilateral posterior quadrant (max O1/P7, O2/P8) regions, at times very broad in field.  - Rare stimulus-induced very brief, fluctuating 1-2 Hz bifrontal bilateral independent lateralized periodic discharges with rhythmicity (LPD+R).  - Moderate to severe generalized slowing.    _____________________________________________________________  EEG IMPRESSION/CLINICAL CORRELATE    Abnormal EEG study.  1. Potential multifocal epileptogenic foci in the bilateral frontal, bilateral temporal and bilateral posterior quadrant regions.   2. Moderate to severe nonspecific diffuse or multifocal cerebral dysfunction.   3. No seizure seen.    _____________________________________________________________    Atif Canales MD  Attending Physician, [facility]

## 2020-11-18 NOTE — PROGRESS NOTE ADULT - SUBJECTIVE AND OBJECTIVE BOX
Patient is a 80y old  Female who presents with a chief complaint of AMS (2020 09:22)      BRIEF HOSPITAL COURSE:   80 year old female, PMHx of PPM, DM, HTN, & Hyperthyroid, presents to the ED after frequent falls and increased confusion at home. In ED pt arrived in thyroid storm, admitted to MICU for tx.   11/15: Pt developed acute hypoxic respiratory failure while eating was placed on HFNC but was ultimately intubated. Increased weakness and right sided facial droop prior to intubation.    Events last 24 hours:   Pt remains intubated off sedation. Pt still on esmolol drip.    PAST MEDICAL & SURGICAL HISTORY:  HTN (hypertension)  DM (diabetes mellitus)  Pacemaker      Review of Systems:  Limited ROS due to intubation and poor mental status    Medications:  piperacillin/tazobactam IVPB.. 3.375 Gram(s) IV Intermittent every 12 hours  metoprolol tartrate 25 milliGRAM(s) Enteral Tube every 6 hours  albuterol/ipratropium for Nebulization. 3 milliLiter(s) Nebulizer every 6 hours PRN  acetaminophen   Tablet .. 650 milliGRAM(s) Oral every 6 hours PRN  fentaNYL    Injectable 50 MICROGram(s) IV Push every 2 hours PRN  levETIRAcetam  IVPB 500 milliGRAM(s) IV Intermittent every 12 hours  heparin   Injectable 5000 Unit(s) SubCutaneous every 12 hours  pantoprazole  Injectable 40 milliGRAM(s) IV Push daily  dextrose 40% Gel 15 Gram(s) Oral once  dextrose 50% Injectable 50 milliLiter(s) IV Push every 15 minutes  glucagon  Injectable 1 milliGRAM(s) IntraMuscular once  hydrocortisone sodium succinate Injectable 50 milliGRAM(s) IV Push every 6 hours  insulin glargine Injectable (LANTUS) 50 Unit(s) SubCutaneous at bedtime  insulin lispro (ADMELOG) corrective regimen sliding scale   SubCutaneous every 6 hours  propylthiouracil 200 milliGRAM(s) Oral every 8 hours  dextrose 5%. 1000 milliLiter(s) IV Continuous <Continuous>  dextrose 5%. 1000 milliLiter(s) IV Continuous <Continuous>  potassium iodide 10%/iodine 5% Oral Liquid - Peds 0.25 milliLiter(s) Oral every 6 hours  sodium chloride 0.9% lock flush 10 milliLiter(s) IV Push every 1 hour PRN  chlorhexidine 0.12% Liquid 15 milliLiter(s) Oral Mucosa every 12 hours        Mode: AC/ CMV (Assist Control/ Continuous Mandatory Ventilation)  RR (machine): 20  TV (machine): 380  FiO2: 50  PEEP: 5  MAP: 11  PIP: 27      ICU Vital Signs Last 24 Hrs  T(C): 37.5 (2020 08:00), Max: 38 (2020 12:00)  T(F): 99.5 (2020 08:00), Max: 100.4 (2020 12:00)  HR: 118 (2020 09:15) (100 - 130)  BP: 157/67 (2020 08:45) (100/59 - 175/74)  BP(mean): 96 (2020 08:45) (71 - 122)  ABP: --  ABP(mean): --  RR: 36 (2020 08:45) (26 - 36)  SpO2: 96% (2020 09:15) (92% - 96%)      ABG - ( 2020 23:02 )  pH, Arterial: 7.44  pH, Blood: x     /  pCO2: 39    /  pO2: 86    / HCO3: 27    / Base Excess: 2.4   /  SaO2: 97        LABS:                        7.8    20.16 )-----------( 92       ( 2020 03:11 )             25.6     11-18    142  |  104  |  88.0<H>  ----------------------------<  136<H>  3.7   |  27.0  |  1.36<H>    Ca    9.0      2020 03:11  Phos  3.3     -18  Mg     3.0     -18    TPro  5.6<L>  /  Alb  2.6<L>  /  TBili  3.8<H>  /  DBili  2.7<H>  /  AST  1330<H>  /  ALT  1234<H>  /  AlkPhos  99  11-17          CAPILLARY BLOOD GLUCOSE  POCT Blood Glucose.: 197 mg/dL (2020 06:26)    PT/INR - ( 2020 10:37 )   PT: 22.1 sec;   INR: 1.97 ratio    PTT - ( 2020 12:45 )  PTT:30.4 sec    Urinalysis Basic - ( 2020 10:38 )  Color: Yellow / Appearance: Clear / S.010 / pH: x  Gluc: x / Ketone: Negative  / Bili: Negative / Urobili: Negative mg/dL   Blood: x / Protein: 30 mg/dL / Nitrite: Negative   Leuk Esterase: Negative / RBC: 25-50 /HPF / WBC 0-2   Sq Epi: x / Non Sq Epi: Occasional / Bacteria: Few      CULTURES:  C Diff by PCR Result: NotDetec ( @ 03:11)  Culture Results:   >=3 organisms. Probable collection contamination. (11-15 @ 01:27)  Culture Results:   No growth at 48 hours ( @ 21:42)  Culture Results:   No growth at 48 hours ( @ 21:41)      Physical Examination:    General: Thin, elderly female, Intubated, no sedation since 11/15, pt minimally responsive to noxious stimuli. EEG in place this morning      HEENT: Pupils equal, reactive to light.  Symmetric.    PULM: Diminished BS at the bases, minimal thick sputum production    CVS: Atrial fibrillation rate 100-115, no murmurs, rubs, or gallops    ABD: Soft, nondistended, hypoactive bowel sounds, no masses. Tube feedings running, wiggins catheter in place.     EXT: No edema to BLE, +2 pitting edema to BUE    SKIN: Warm and well perfused, no rashes noted.    RADIOLOGY: Reviewed

## 2020-11-19 NOTE — CONSULT NOTE ADULT - ASSESSMENT
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 López for IVC filter placement  -request MICU clearance note

## 2020-11-19 NOTE — PROVIDER CONTACT NOTE (OTHER) - BACKGROUND
This is a recent snapshot of the patient's [facility] medical record.  For current drug dose and complete information and questions, call [phone] or In Basket pool, [facility] ([id number])  CSN Number:  [id number]      
80 YOF with PMH of T2D, HTN, pacemaker admitted for thyrotoxicosis

## 2020-11-19 NOTE — PROGRESS NOTE ADULT - SUBJECTIVE AND OBJECTIVE BOX
[facility]  INFECTIOUS DISEASES AND INTERNAL MEDICINE at [place]  =======================================================  Willam Luu MD  Diplomates American Board of Internal Medicine and Infectious Diseases  Tel  [phone]  Fax [phone]  =======================================================    [id number]  SAULO LEROY  follow up:  lung infiltrates, fevers    patient seen/ examined  clinically worsening  escalated on empiric antibiotics  blood cx sent      =======================================================    REVIEW OF SYSTEMS:  Limited due to medical condition    =======================================================  Allergies  No Known Allergies     ======================================================  Physical Exam:  ============    General:  THIN FRAIL, sedated  Eye: Pupils are equal, round and reactive to light,  BILATERAL pterygium,  right WORSE THAN LEFT;  bilateral cataracts  HENT: Normocephalic, Oral mucosa is dry  ET Tube in place  Neck: Supple, No lymphadenopathy.  Respiratory: Lungs with fair air entry anteriorly  Cardiovascular: Normal rate, Regular rhythm,   Gastrointestinal: Soft,  Non-distended, Normal bowel sounds.  Genitourinary: PANG with clear urine  Lymphatics: No lymphadenopathy neck,   Musculoskeletal: no joint abnl  Integumentary: No rash.  Neurologic: sedated, unable to assess    =======================================================   Vitals:  ============  T(F): 99.7 (19 Nov 2020 07:23), Max: 101.3 (18 Nov 2020 20:19)  HR: 127 (19 Nov 2020 09:24)  BP: 90/57 (19 Nov 2020 08:00)  RR: 26 (19 Nov 2020 08:00)  SpO2: 96% (19 Nov 2020 09:24) (94% - 98%)  temp max in last 48H T(F): , Max: 101.3 (11-18-20 @ 20:19)    =======================================================  Current Antibiotics:  meropenem  IVPB 1000 milliGRAM(s) IV Intermittent every 12 hours  vancomycin  IVPB      vancomycin  IVPB 500 milliGRAM(s) IV Intermittent every 12 hours    Other medications:  chlorhexidine 0.12% Liquid 15 milliLiter(s) Oral Mucosa every 12 hours  dextrose 40% Gel 15 Gram(s) Oral once  dextrose 5%. 1000 milliLiter(s) IV Continuous <Continuous>  dextrose 5%. 1000 milliLiter(s) IV Continuous <Continuous>  dextrose 50% Injectable 50 milliLiter(s) IV Push every 15 minutes  glucagon  Injectable 1 milliGRAM(s) IntraMuscular once  hydrocortisone sodium succinate Injectable 50 milliGRAM(s) IV Push every 6 hours  insulin regular Infusion 3 Unit(s)/Hr IV Continuous <Continuous>  levETIRAcetam  IVPB 500 milliGRAM(s) IV Intermittent every 12 hours  metoprolol tartrate 25 milliGRAM(s) Enteral Tube every 6 hours  multiple electrolytes Injection Type 1 Bolus 250 milliLiter(s) IV Bolus once  pantoprazole  Injectable 40 milliGRAM(s) IV Push daily  potassium chloride  10 mEq/100 mL IVPB 10 milliEquivalent(s) IV Intermittent every 1 hour  potassium iodide 10%/iodine 5% Oral Liquid - Peds 0.25 milliLiter(s) Oral every 6 hours  propylthiouracil 200 milliGRAM(s) Oral every 8 hours      =======================================================  Labs:                        6.5    29.30 )-----------( 131      ( 19 Nov 2020 03:17 )             22.0      11-19    146<H>  |  108<H>  |  92.0<H>  ----------------------------<  259<H>  3.4<L>   |  27.0  |  0.93    Ca    9.2      19 Nov 2020 03:17  Phos  2.7     11-19  Mg     2.8     11-19        GI PCR Panel, Stool (collected 11-18-20 @ 15:37)  Source: .Stool Feces  Final Report (11-18-20 @ 19:19):    GI PCR Results: NOT detected    *******Please Note:*******    GI panel PCR evaluates for:    Campylobacter, Plesiomonas shigelloides, Salmonella,    Vibrio, Yersinia enterocolitica, Enteroaggregative    Escherichia coli (EAEC), Enteropathogenic E.coli (EPEC),    Enterotoxigenic E. coli (ETEC) lt/st, Shiga-like    toxin-producing E. coli (STEC) stx1/stx2,    Shigella/ Enteroinvasive E. coli (EIEC), Cryptosporidium,    Cyclospora cayetanensis, Entamoeba histolytica,    Giardia lamblia, Adenovirus F 40/41, Astrovirus,    Norovirus GI/GII, Rotavirus A, Sapovirus    Culture - Sputum (collected 11-17-20 @ 22:03)  Source: .Sputum Sputum  Gram Stain (11-17-20 @ 23:42):    Few polymorphonuclear leukocytes per low power field    No Squamous epithelial cells per low power field    Few Yeast like cells per oil power field    Culture - Urine (collected 11-15-20 @ 01:27)  Source: .Urine Clean Catch (Midstream)  Final Report (11-16-20 @ 19:28):    >=3 organisms. Probable collection contamination.    Culture - Blood (collected 11-14-20 @ 21:42)  Source: .Blood Blood-Peripheral    Culture - Blood (collected 11-14-20 @ 21:41)  Source: .Blood Blood-Peripheral      Creatinine, Serum: 0.93 mg/dL (11-19-20 @ 03:17)  Creatinine, Serum: 1.36 mg/dL (11-18-20 @ 03:11)  Creatinine, Serum: 1.93 mg/dL (11-17-20 @ 04:36)  Creatinine, Serum: 1.93 mg/dL (11-16-20 @ 04:33)  Creatinine, Serum: 1.24 mg/dL (11-15-20 @ 14:09)  Creatinine, Serum: 0.68 mg/dL (11-15-20 @ 06:16)  Creatinine, Serum: 0.74 mg/dL (11-14-20 @ 21:30)    Procalcitonin, Serum: 51.48 ng/mL (11-17-20 @ 04:36)       WBC Count: 29.30 K/uL (11-19-20 @ 03:17)  WBC Count: 20.16 K/uL (11-18-20 @ 03:11)  WBC Count: 16.08 K/uL (11-17-20 @ 04:36)  WBC Count: 11.72 K/uL (11-16-20 @ 04:33)  WBC Count: 7.78 K/uL (11-15-20 @ 14:09)  WBC Count: 9.17 K/uL (11-15-20 @ 06:16)  WBC Count: 7.86 K/uL (11-14-20 @ 21:30)      COVID-19 IgG Antibody Interpretation: Negative (11-15-20 @ 08:21)  COVID-19 IgG Antibody Index: <0.10 Index (11-15-20 @ 08:21)  COVID-19 PCR: NotDetec (11-14-20 @ 22:00)      Alkaline Phosphatase, Serum: 99 U/L (11-17-20 @ 04:36)  Alkaline Phosphatase, Serum: 76 U/L (11-16-20 @ 04:33)  Alkaline Phosphatase, Serum: 79 U/L (11-15-20 @ 14:09)  Alanine Aminotransferase (ALT/SGPT): 1234 U/L (11-17-20 @ 04:36)  Alanine Aminotransferase (ALT/SGPT): 1230 U/L (11-16-20 @ 04:33)  Alanine Aminotransferase (ALT/SGPT): 280 U/L (11-15-20 @ 14:09)  Aspartate Aminotransferase (AST/SGOT): 1330 U/L (11-17-20 @ 04:36)  Aspartate Aminotransferase (AST/SGOT): 4526 U/L (11-16-20 @ 04:33)  Aspartate Aminotransferase (AST/SGOT): 841 U/L (11-15-20 @ 14:09)  Bilirubin Total, Serum: 3.8 mg/dL (11-17-20 @ 04:36)  Bilirubin Total, Serum: 2.1 mg/dL (11-16-20 @ 04:33)  Bilirubin Total, Serum: 1.4 mg/dL (11-15-20 @ 14:09)  Bilirubin Direct, Serum: 2.7 mg/dL (11-17-20 @ 04:36)

## 2020-11-19 NOTE — CONSULT NOTE ADULT - SUBJECTIVE AND OBJECTIVE BOX
ACUTE CARE SURGERY CONSULT    HPI:  Patient is a 80 year old female with a pmhx of PPM, DM, HTN, hyperthyroid who presents with AMS. Per daughter she brought patient in as she has been having frequent falls at home. Over the past few days she noticed patient becoming more confused. Patient had fall yesetrday and was unable to get up and was then brought in to Ed. Upon arrival noted to be SVT, HTN. TSH undetectable and had high t3/4. MICU then consulted.  (14 Nov 2020 23:26)    Patient since admission developed acute respiratory failure from aspiration PNA s/p intubation. Noted recently to have  GI bleed (fecal occult blood positive), requiring 2units of pRBCs. 11/19 LE duplex with L pop vein thrombus.       PAST MEDICAL HISTORY:  HTN (hypertension)    DM (diabetes mellitus)    Pacemaker    Unable to obtain ROS    MEDICATIONS  (STANDING):  acyclovir IVPB 950 milliGRAM(s) IV Intermittent every 12 hours  chlorhexidine 0.12% Liquid 15 milliLiter(s) Oral Mucosa every 12 hours  dextrose 40% Gel 15 Gram(s) Oral once  dextrose 5%. 1000 milliLiter(s) (50 mL/Hr) IV Continuous <Continuous>  dextrose 5%. 1000 milliLiter(s) (100 mL/Hr) IV Continuous <Continuous>  dextrose 50% Injectable 50 milliLiter(s) IV Push every 15 minutes  fentaNYL    Injectable 50 MICROGram(s) IV Push once  glucagon  Injectable 1 milliGRAM(s) IntraMuscular once  hydrocortisone sodium succinate Injectable 50 milliGRAM(s) IV Push every 6 hours  insulin regular Infusion 3 Unit(s)/Hr (3 mL/Hr) IV Continuous <Continuous>  levETIRAcetam  IVPB 500 milliGRAM(s) IV Intermittent every 12 hours  meropenem  IVPB 1000 milliGRAM(s) IV Intermittent every 12 hours  metoprolol tartrate 50 milliGRAM(s) Enteral Tube every 6 hours  pantoprazole  Injectable 40 milliGRAM(s) IV Push daily  potassium iodide 10%/iodine 5% Oral Liquid - Peds 0.25 milliLiter(s) Oral every 6 hours  propylthiouracil 200 milliGRAM(s) Oral every 8 hours  vancomycin  IVPB      vancomycin  IVPB 500 milliGRAM(s) IV Intermittent every 12 hours    MEDICATIONS  (PRN):  acetaminophen    Suspension .. 650 milliGRAM(s) Oral every 6 hours PRN Temp greater or equal to 38C (100.4F)  albuterol/ipratropium for Nebulization. 3 milliLiter(s) Nebulizer every 6 hours PRN Bronchospasm  fentaNYL    Injectable 50 MICROGram(s) IV Push every 2 hours PRN vent synchrony/pain  sodium chloride 0.9% lock flush 10 milliLiter(s) IV Push every 1 hour PRN Pre/post blood products, medications, blood draw, and to maintain line patency      VITALS & I/Os:  Vital Signs Last 24 Hrs  T(C): 37.4 (19 Nov 2020 20:00), Max: 38.7 (19 Nov 2020 12:00)  T(F): 99.3 (19 Nov 2020 20:00), Max: 101.7 (19 Nov 2020 12:00)  HR: 90 (19 Nov 2020 20:00) (80 - 138)  BP: 179/96 (19 Nov 2020 20:00) (90/57 - 205/86)  BP(mean): 122 (19 Nov 2020 20:00) (67 - 123)  RR: 22 (19 Nov 2020 20:00) (20 - 35)  SpO2: 100% (19 Nov 2020 20:00) (94% - 100%)  CAPILLARY BLOOD GLUCOSE      POCT Blood Glucose.: 107 mg/dL (19 Nov 2020 20:04)  POCT Blood Glucose.: 118 mg/dL (19 Nov 2020 18:55)  POCT Blood Glucose.: 124 mg/dL (19 Nov 2020 17:38)  POCT Blood Glucose.: 148 mg/dL (19 Nov 2020 16:12)  POCT Blood Glucose.: 160 mg/dL (19 Nov 2020 14:52)  POCT Blood Glucose.: 172 mg/dL (19 Nov 2020 14:11)  POCT Blood Glucose.: 194 mg/dL (19 Nov 2020 12:47)  POCT Blood Glucose.: 185 mg/dL (19 Nov 2020 11:51)  POCT Blood Glucose.: 190 mg/dL (19 Nov 2020 10:50)  POCT Blood Glucose.: 207 mg/dL (19 Nov 2020 09:58)  POCT Blood Glucose.: 242 mg/dL (19 Nov 2020 08:53)  POCT Blood Glucose.: 288 mg/dL (19 Nov 2020 07:54)  POCT Blood Glucose.: 336 mg/dL (19 Nov 2020 05:46)  POCT Blood Glucose.: 288 mg/dL (18 Nov 2020 22:49)    Mode: AC/ CMV (Assist Control/ Continuous Mandatory Ventilation)  RR (machine): 20  TV (machine): 380  FiO2: 40  PEEP: 5  MAP: 8  PIP: 24    I&O's Summary    18 Nov 2020 07:01  -  19 Nov 2020 07:00  --------------------------------------------------------  IN: 3017.5 mL / OUT: 2620 mL / NET: 397.5 mL    19 Nov 2020 07:01  -  19 Nov 2020 20:39  --------------------------------------------------------  IN: 1301 mL / OUT: 1325 mL / NET: -24 mL        GENERAL: Intubated, sedated  CARDIOVASCULAR: +s1/s2  GASTROINTESTINAL: Abdomen soft  VASCULAR: Palpable 2+ radial and femoral pulses      LABS:                        9.1    34.30 )-----------( 126      ( 19 Nov 2020 17:07 )             28.4     11-19    146<H>  |  108<H>  |  92.0<H>  ----------------------------<  259<H>  3.4<L>   |  27.0  |  0.93    Ca    9.2      19 Nov 2020 03:17  Phos  2.7     11-19  Mg     2.8     11-19      Lactate: acetaminophen    Suspension .. 650 milliGRAM(s) Oral every 6 hours PRN  acyclovir IVPB 950 milliGRAM(s) IV Intermittent every 12 hours  albuterol/ipratropium for Nebulization. 3 milliLiter(s) Nebulizer every 6 hours PRN  chlorhexidine 0.12% Liquid 15 milliLiter(s) Oral Mucosa every 12 hours  dextrose 40% Gel 15 Gram(s) Oral once  dextrose 5%. 1000 milliLiter(s) IV Continuous <Continuous>  dextrose 5%. 1000 milliLiter(s) IV Continuous <Continuous>  dextrose 50% Injectable 50 milliLiter(s) IV Push every 15 minutes  fentaNYL    Injectable 50 MICROGram(s) IV Push every 2 hours PRN  fentaNYL    Injectable 50 MICROGram(s) IV Push once  glucagon  Injectable 1 milliGRAM(s) IntraMuscular once  hydrocortisone sodium succinate Injectable 50 milliGRAM(s) IV Push every 6 hours  insulin regular Infusion 3 Unit(s)/Hr IV Continuous <Continuous>  levETIRAcetam  IVPB 500 milliGRAM(s) IV Intermittent every 12 hours  meropenem  IVPB 1000 milliGRAM(s) IV Intermittent every 12 hours  metoprolol tartrate 50 milliGRAM(s) Enteral Tube every 6 hours  pantoprazole  Injectable 40 milliGRAM(s) IV Push daily  potassium iodide 10%/iodine 5% Oral Liquid - Peds 0.25 milliLiter(s) Oral every 6 hours  propylthiouracil 200 milliGRAM(s) Oral every 8 hours  sodium chloride 0.9% lock flush 10 milliLiter(s) IV Push every 1 hour PRN  vancomycin  IVPB      vancomycin  IVPB 500 milliGRAM(s) IV Intermittent every 12 hours     PT/INR - ( 19 Nov 2020 03:17 )   PT: 17.8 sec;   INR: 1.57 ratio         PTT - ( 19 Nov 2020 03:17 )  PTT:24.1 sec  ABG - ( 18 Nov 2020 12:25 )  pH, Arterial: 7.51  pH, Blood: x     /  pCO2: 37    /  pO2: 195   / HCO3: 30    / Base Excess: 6.2   /  SaO2: 99                CARDIAC MARKERS ( 19 Nov 2020 17:06 )  x     / 0.07 ng/mL / 147 U/L / x     / x      CARDIAC MARKERS ( 19 Nov 2020 03:17 )  x     / 0.05 ng/mL / 205 U/L / x     / 1.3 ng/mL            IMAGING:

## 2020-11-19 NOTE — PROGRESS NOTE ADULT - SUBJECTIVE AND OBJECTIVE BOX
On Admission  11-14-20 (5d)  HPI:  Patient is a 80 year old female with a pmhx of PPM, DM, HTN, hyperthyroid who presents with AMS. Per daughter she brought patient in as she has been having frequent falls at home. Over the past few days she noticed patient becoming more confused. Patient had fall yesetrday and was unable to get up and was then brought in to Ed. Upon arrival noted to be SVT, HTN. TSH undetectable and had high t3/4. MICU then consulted.  (14 Nov 2020 23:26)    PAST MEDICAL & SURGICAL HISTORY:  HTN (hypertension)    DM (diabetes mellitus)    Pacemaker        Antimicrobial:  meropenem  IVPB 1000 milliGRAM(s) IV Intermittent every 12 hours  vancomycin  IVPB      vancomycin  IVPB 500 milliGRAM(s) IV Intermittent every 12 hours    Cardiovascular:  metoprolol tartrate 50 milliGRAM(s) Enteral Tube every 6 hours    Pulmonary:  albuterol/ipratropium for Nebulization. 3 milliLiter(s) Nebulizer every 6 hours PRN    Hematalogic:    Other:  acetaminophen    Suspension .. 650 milliGRAM(s) Oral every 6 hours PRN  chlorhexidine 0.12% Liquid 15 milliLiter(s) Oral Mucosa every 12 hours  dextrose 40% Gel 15 Gram(s) Oral once  dextrose 5%. 1000 milliLiter(s) IV Continuous <Continuous>  dextrose 5%. 1000 milliLiter(s) IV Continuous <Continuous>  dextrose 50% Injectable 50 milliLiter(s) IV Push every 15 minutes  fentaNYL    Injectable 50 MICROGram(s) IV Push every 2 hours PRN  glucagon  Injectable 1 milliGRAM(s) IntraMuscular once  hydrocortisone sodium succinate Injectable 50 milliGRAM(s) IV Push every 6 hours  insulin regular Infusion 3 Unit(s)/Hr IV Continuous <Continuous>  levETIRAcetam  IVPB 500 milliGRAM(s) IV Intermittent every 12 hours  pantoprazole  Injectable 40 milliGRAM(s) IV Push daily  potassium iodide 10%/iodine 5% Oral Liquid - Peds 0.25 milliLiter(s) Oral every 6 hours  propylthiouracil 200 milliGRAM(s) Oral every 8 hours  sodium chloride 0.9% lock flush 10 milliLiter(s) IV Push every 1 hour PRN      Drug Dosing Weight  Height (cm): 162.6 (14 Nov 2020 21:02)  Weight (kg): 65.8 (15 Nov 2020 08:00)  BMI (kg/m2): 24.9 (15 Nov 2020 08:00)  BSA (m2): 1.71 (15 Nov 2020 08:00)    T(C): 38.7 (11-19-20 @ 12:00), Max: 38.7 (11-19-20 @ 12:00)  HR: 90 (11-19-20 @ 13:00)  BP: 147/66 (11-19-20 @ 13:00)  BP(mean): 87 (11-19-20 @ 13:00)  ABP: --  ABP(mean): --  RR: 27 (11-19-20 @ 13:00)  SpO2: 99% (11-19-20 @ 13:00)    ABG - ( 18 Nov 2020 12:25 )  pH, Arterial: 7.51  pH, Blood: x     /  pCO2: 37    /  pO2: 195   / HCO3: 30    / Base Excess: 6.2   /  SaO2: 99                    11-18 @ 07:01  -  11-19 @ 07:00  --------------------------------------------------------  IN: 3017.5 mL / OUT: 2620 mL / NET: 397.5 mL        Mode: AC/ CMV (Assist Control/ Continuous Mandatory Ventilation)  RR (machine): 20  TV (machine): 380  FiO2: 40  PEEP: 5  MAP: 9  PIP: 27        LABS:  CBC Full  -  ( 19 Nov 2020 03:17 )  WBC Count : 29.30 K/uL  RBC Count : 2.43 M/uL  Hemoglobin : 6.5 g/dL  Hematocrit : 22.0 %  Platelet Count - Automated : 131 K/uL  Mean Cell Volume : 90.5 fl  Mean Cell Hemoglobin : 26.7 pg  Mean Cell Hemoglobin Concentration : 29.5 gm/dL  Auto Neutrophil # : x  Auto Lymphocyte # : x  Auto Monocyte # : x  Auto Eosinophil # : x  Auto Basophil # : x  Auto Neutrophil % : x  Auto Lymphocyte % : x  Auto Monocyte % : x  Auto Eosinophil % : x  Auto Basophil % : x    11-19    146<H>  |  108<H>  |  92.0<H>  ----------------------------<  259<H>  3.4<L>   |  27.0  |  0.93    Ca    9.2      19 Nov 2020 03:17  Phos  2.7     11-19  Mg     2.8     11-19      PT/INR - ( 19 Nov 2020 03:17 )   PT: 17.8 sec;   INR: 1.57 ratio         PTT - ( 19 Nov 2020 03:17 )  PTT:24.1 sec    Culture Results:   GI PCR Results: NOT detected  *******Please Note:*******  GI panel PCR evaluates for:  Campylobacter, Plesiomonas shigelloides, Salmonella,  Vibrio, Yersinia enterocolitica, Enteroaggregative  Escherichia coli (EAEC), Enteropathogenic E.coli (EPEC),  Enterotoxigenic E. coli (ETEC) lt/st, Shiga-like  toxin-producing E. coli (STEC) stx1/stx2,  Shigella/ Enteroinvasive E. coli (EIEC), Cryptosporidium,  Cyclospora cayetanensis, Entamoeba histolytica,  Giardia lamblia, Adenovirus F 40/41, Astrovirus,  Norovirus GI/GII, Rotavirus A, Sapovirus (11-18 @ 15:37)  Culture Results:   Normal Respiratory Priscilla present (11-17 @ 22:03)  Culture Results:   No growth at 48 hours (11-17 @ 11:37)  Culture Results:   No growth at 48 hours (11-17 @ 10:37)    ____________________________________________________________________________________________________

## 2020-11-19 NOTE — PROGRESS NOTE ADULT - ASSESSMENT
Exam:  intubated, not on sedation  poorly responsive  irregular rhythm  bilat air entry  abd soft  trace edema    Impression:  80 female with hx of DM, HTN, hyperthyroidism, PPM, presented with falls, found to be in SVT likely due to thyrotoxicosis.  Developed acute respiratory failure requiring intubation after a witnessed aspiration event.  Now with acute respiratory failure, encephalopathy, aspiration pneumonia.  Unclear etiology of encephalopathy.  Unable to undergo MRI due to PPM.  EEG without seizures.  LP risky due to coagulopathy.    Encephalopathy:  Unclear etiology, could not undergo MRI due to PPM, may need to accept risk of LP.  Patient was responsive when she first arrived in hospital.     Aspiration pnuemonia/ sepsis  Improved vent settings, however still spiking fevers, unclear if there is other source.  Cultures pending  ID following, on vancomycin and meropenem     Anemia:  Transfusing 2 units  Possible GI source, Heme+ stool, continue PPI  If continues to drop Hb may need EGD.    Hyperthyroid  On PTU, hydrocortisone, beta blocker, posassium iodide   Endocrinology following     Afib  - increased metoprolol  - if still tachycardic can add digoxin    Diet: tube feeds  Glycemic control: insulin drip  daughter updated this AM by resident

## 2020-11-19 NOTE — PROGRESS NOTE ADULT - SUBJECTIVE AND OBJECTIVE BOX
[place] [facility]ARDIOVASCULAR - [facility], THE HEART CENTER                                   [address] [place] [address]                                                      PHONE: [phone]                                                         FAX: [phone]  [email or web address]  ---------------------------------------------------------------------------------------------------------------------------------    Overnight events/patient complaints:  NAD on vent 's     No Known Allergies    MEDICATIONS  (STANDING):  chlorhexidine 0.12% Liquid 15 milliLiter(s) Oral Mucosa every 12 hours  dextrose 40% Gel 15 Gram(s) Oral once  dextrose 5%. 1000 milliLiter(s) (50 mL/Hr) IV Continuous <Continuous>  dextrose 5%. 1000 milliLiter(s) (100 mL/Hr) IV Continuous <Continuous>  dextrose 50% Injectable 50 milliLiter(s) IV Push every 15 minutes  glucagon  Injectable 1 milliGRAM(s) IntraMuscular once  hydrocortisone sodium succinate Injectable 50 milliGRAM(s) IV Push every 6 hours  insulin regular Infusion 3 Unit(s)/Hr (3 mL/Hr) IV Continuous <Continuous>  levETIRAcetam  IVPB 500 milliGRAM(s) IV Intermittent every 12 hours  meropenem  IVPB 1000 milliGRAM(s) IV Intermittent every 12 hours  metoprolol tartrate 25 milliGRAM(s) Enteral Tube every 6 hours  multiple electrolytes Injection Type 1 Bolus 250 milliLiter(s) IV Bolus once  pantoprazole  Injectable 40 milliGRAM(s) IV Push daily  potassium chloride  10 mEq/100 mL IVPB 10 milliEquivalent(s) IV Intermittent every 1 hour  potassium iodide 10%/iodine 5% Oral Liquid - Peds 0.25 milliLiter(s) Oral every 6 hours  propylthiouracil 200 milliGRAM(s) Oral every 8 hours  vancomycin  IVPB      vancomycin  IVPB 500 milliGRAM(s) IV Intermittent every 12 hours    MEDICATIONS  (PRN):  acetaminophen    Suspension .. 650 milliGRAM(s) Oral every 6 hours PRN Temp greater or equal to 38C (100.4F)  acetaminophen   Tablet .. 650 milliGRAM(s) Oral every 6 hours PRN Temp greater or equal to 38C (100.4F)  albuterol/ipratropium for Nebulization. 3 milliLiter(s) Nebulizer every 6 hours PRN Bronchospasm  fentaNYL    Injectable 50 MICROGram(s) IV Push every 2 hours PRN vent synchrony/pain  sodium chloride 0.9% lock flush 10 milliLiter(s) IV Push every 1 hour PRN Pre/post blood products, medications, blood draw, and to maintain line patency      Vital Signs Last 24 Hrs  T(C): 37.6 (2020 07:23), Max: 38.5 (2020 20:19)  T(F): 99.7 (2020 07:23), Max: 101.3 (2020 20:19)  HR: 127 (2020 09:24) (80 - 138)  BP: 90/57 (2020 08:00) (90/57 - 174/82)  BP(mean): 67 (2020 08:00) (67 - 108)  RR: 26 (2020 08:00) (19 - 34)  SpO2: 96% (2020 09:24) (94% - 98%)  ICU Vital Signs Last 24 Hrs  SAULO LEROY  I&O's Detail    2020 07:01  -  2020 07:00  --------------------------------------------------------  IN:    Enteral Tube Flush: 1270 mL    Esmolol: 139.5 mL    Glucerna: 920 mL    Insulin: 3 mL    IV PiggyBack: 100 mL    IV PiggyBack: 150 mL    IV PiggyBack: 100 mL    IV PiggyBack: 50 mL    multiple electrolytes Injection Type 1.: 225 mL    Oral Fluid: 60 mL  Total IN: 3017.5 mL    OUT:    Indwelling Catheter - Urethral (mL): 2620 mL  Total OUT: 2620 mL    Total NET: 397.5 mL      :  -  2020 09:38  --------------------------------------------------------  IN:    Glucerna: 120 mL    Insulin: 9 mL  Total IN: 129 mL    OUT:    Indwelling Catheter - Urethral (mL): 150 mL  Total OUT: 150 mL    Total NET: -21 mL        I&O's Summary    2020 07:  -  2020 07:00  --------------------------------------------------------  IN: 3017.5 mL / OUT: 2620 mL / NET: 397.5 mL    2020 07:  -  2020 09:38  --------------------------------------------------------  IN: 129 mL / OUT: 150 mL / NET: -21 mL      Drug Dosing Weight  SAULO LEROY  Mode: AC/ CMV (Assist Control/ Continuous Mandatory Ventilation), RR (machine): 20, TV (machine): 380, FiO2: 40, PEEP: 5, MAP: 9, PIP: 27    PHYSICAL EXAM:  General: Appears well developed, well nourished alert and cooperative.  HEENT: Head; normocephalic, atraumatic.  Eyes: Pupils reactive, cornea wnl.  Neck: Supple, no nodes adenopathy, no NVD or carotid bruit or thyromegaly.  CARDIOVASCULAR: Normal S1 and S2, 1/6 murmur, rub, gallop or lift.   LUNGS: Decrease BS B/L   ABDOMEN: Soft, nontender without mass or organomegaly. bowel sounds normoactive.  EXTREMITIES: No clubbing, cyanosis or edema. Distal pulses wnl.   SKIN: warm and dry with normal turgor.  NEURO: NAD on vent not sedated         LABS:                        6.5    29.30 )-----------( 131      ( 2020 03:17 )             22.0     11-    146<H>  |  108<H>  |  92.0<H>  ----------------------------<  259<H>  3.4<L>   |  27.0  |  0.93    Ca    9.2      2020 03:17  Phos  2.7       Mg     2.8           SAULO RAD  CARDIAC MARKERS ( 2020 03:17 )  x     / 0.05 ng/mL / 205 U/L / x     / 1.3 ng/mL      PT/INR - ( 2020 03:17 )   PT: 17.8 sec;   INR: 1.57 ratio         PTT - ( 2020 03:17 )  PTT:24.1 sec  Urinalysis Basic - ( 2020 10:38 )    Color: Yellow / Appearance: Clear / S.010 / pH: x  Gluc: x / Ketone: Negative  / Bili: Negative / Urobili: Negative mg/dL   Blood: x / Protein: 30 mg/dL / Nitrite: Negative   Leuk Esterase: Negative / RBC: 25-50 /HPF / WBC 0-2   Sq Epi: x / Non Sq Epi: Occasional / Bacteria: Few      ECHO:  Summary:   1. Left ventricular ejection fraction, by visual estimation, is >75%.   2. Hyperdynamic global left ventricular systolic function.   3. Severely enlarged right atrium.   4. The mitral in-flow pattern reveals no discernable A-wave, therefore no comment on diastolic function can be made.   5. There is mild concentric left ventricular hypertrophy.   6. Moderately enlarged left atrium.   7. Mild mitral annular calcification.   8. Mild mitral valve regurgitation.   9. Thickening and calcification of the anterior and posterior mitral valve leaflets.  10. Moderate tricuspid regurgitation.  11. Sclerotic aortic valve with normal opening.  12. Dilateed Asc Ao at 3.8 cm. Dilated Ao root at 3.8cm.  13. Estimated pulmonary artery systolic pressure is 37.8 mmHg assuming a right atrial pressure of 8 mmHg, which is consistent with borderline pulmonary hypertension.    MD Angélica Electronically signed on 2020 at 10:09:29 AM      STRESS TEST: 3/2019 normal perfusion     CARDIAC CATHETERIZATION: 2016 none obstructive CAD     Assessment and Plan:  In summary, SAULO LEROY is an 80y Female with past medical history significant for 80y Female with history of DM HTN HLD none obstructive CAD prior cath  but Nuclear stress test  normal perfusion SSS s/p PPM who presents to ED AMS and frequent falls at home.  The patient was found have thyroid storm and was admitted to MICU for acute respiratory failure likely due to ASP PNA.  Severe sepsis VASHTI and shock liver.  The patient was found to have new onset AF RVR and currently on esmolol gtt and off Levophed.  's and 's.  History was obtained from chart since patient intubated.  TTE hyperdynamic LV EF > 75%.  Not a good candidate for long term AC due to bleeding and fall risk.  Patient is DNR; HR 's with stable SBP; + DVT anemia Hg 6.5 ; VASHTI improving        Plan  1.  Agree PRBC and keep Hg > 8  2.  Continue B-Blockers po and prn if HR > 130 with stable BP   3.  Abx as per ID    Care as per MICU team      poor prognosis and consider Hospice

## 2020-11-19 NOTE — PROGRESS NOTE ADULT - ASSESSMENT
This 80 year old female with a pmhx of PPM, DM, HTN, hyperthyroid who presents with AMS. Per daughter she brought patient in as she has been having frequent falls at home. Over the past few days she noticed patient becoming more confused. Patient had fall (11/13/2020) and was unable to get up and was then brought in to Ed. Upon arrival noted to be SVT, HTN. TSH undetectable and had high t3/4. MICU then consulted.  (14 Nov 2020 23:26)    he was found with acute respiratory failure with hypoxia. Per team, aspiration event noted as well.   Images showed  L perihilar area with patchy infiltrates > R perihilar.  empiric antibiotics were started.     patient remains intubated under MICU care.  Thyroid storm also being managed by the ICU    Impression:  - acute respiratory failure  - lung infiltrates  - thyroid storm  - WBC elevation  - acute renal failure  - transaminitis  - shock      Plan:    - continue Empiric antibiotics  - follow up all outstanding cultures  - trend temperature and WBC curve  - repeat cultures from blood and all sources if febrile.  - procalcitonin of 51  - BLOOD cultures from 11/14 x 2 are negative  repeat cx sent    -Please send sputum cx    - multiple labs abnormal from shock    Serum BNP markedly elevated  Liver enzymes elevated  - likely related to sepsis.   Acute renal failure  - will watch closely         Consider goals of care

## 2020-11-20 NOTE — PROGRESS NOTE ADULT - ASSESSMENT
Exam:  intubated, not on sedation  poorly responsive  irregular rhythm  bilat air entry  abd soft  trace edema    Impression:  80 female with hx of DM, HTN, hyperthyroidism, PPM, presented with falls, found to be in SVT likely due to thyrotoxicosis.  Developed acute respiratory failure requiring intubation after a witnessed aspiration event.  Now with acute respiratory failure, encephalopathy, aspiration pneumonia.  Unclear etiology of encephalopathy.  Unable to undergo MRI due to PPM.  EEG without seizures.  LP risky due to coagulopathy.    Encephalopathy:  Unclear etiology, could not undergo MRI due to PPM, may need to accept risk of LP.  Patient was responsive when she first arrived in hospital.     SEPSIS/ aspiration pneumonia   Improved vent settings, however still spiking fevers, elevated WBC - ?meningitis, on empiric coverage - will attempt LP today  Cultures pending  ID following, on vancomycin and meropenem     Anemia:  Received 2 units of PRBC 11/19  Possible GI source, Heme+ stool, continue PPI  If continues to drop Hb may need EGD.    Hyperthyroid  On PTU, hydrocortisone, beta blocker, posassium iodide   Endocrinology following     Afib  - rate controlled with metoprolol  - no AC due to GI blood loss     DVT  no AC due to anemia/ GI bleed  - IVC filter today    Diet: tube feeds  Glycemic control: lispro  Will update daughter Exam:  intubated, not on sedation  poorly responsive  irregular rhythm  bilat air entry  abd soft  trace edema    Impression:  80 female with hx of DM, HTN, hyperthyroidism, PPM, presented with falls, found to be in SVT likely due to thyrotoxicosis.  Developed acute respiratory failure requiring intubation after a witnessed aspiration event.  Now with acute respiratory failure, encephalopathy, aspiration pneumonia.  Unclear etiology of encephalopathy.  Unable to undergo MRI due to PPM.  EEG without seizures.  LP risky due to coagulopathy.    Encephalopathy:  Unclear etiology, could not undergo MRI due to PPM, may need to accept risk of LP.  Patient was responsive when she first arrived in hospital.     SEPSIS/ aspiration pneumonia   Improved vent settings, however still spiking fevers, elevated WBC - ?meningitis, on empiric coverage - will attempt LP today  Cultures pending  ID following, on vancomycin and meropenem     Anemia:  Received 2 units of PRBC 11/19  Possible GI source, Heme+ stool, continue PPI  If continues to drop Hb may need EGD.    Hyperthyroid  On PTU, hydrocortisone, beta blocker, posassium iodide   Endocrinology following     Afib  - rate controlled with metoprolol  - no AC due to GI blood loss     DVT  no AC due to anemia/ GI bleed  - IVC filter today    Diet: tube feeds  Glycemic control: lispro  daughter daivd updated by phone

## 2020-11-20 NOTE — PROGRESS NOTE ADULT - ASSESSMENT
80y Female with history of DM HTN HLD none obstructive CAD prior cath 2016 but Nuclear stress test 2019 normal perfusion SSS s/p PPM who presents to ED AMS and frequent falls at home.  The patient was found have thyroid storm and was admitted to MICU for acute respiratory failure likely due to ASP PNA.  Severe sepsis VASHTI and shock liver.  The patient was found to have new onset AF RVR  TTE hyperdynamic LV EF > 75%.  Not a good candidate for long term AC due to bleeding and fall risk.  Patient is DNR; HR 's with stable SBP; + DVT anemia Hg 6.5 ; VASHTI improving        AF rates elevated but responding to metoprolol. Can use iv metoprolol if needed. Not on anticoagulation due to anemia  On PTU, hydrocortisone for hyperthyroidism  Care as per MICU team

## 2020-11-20 NOTE — PROGRESS NOTE ADULT - SUBJECTIVE AND OBJECTIVE BOX
INTERVAL HPI/OVERNIGHT EVENTS:  Vasc surgery consulted for IVC filter placement. Pt intubated for aspiration PNA and AMS, with GI bleed and left popliteal vein thrombosis.    Pt not on any sedation yet remains unarousable. MICU with plans for LP today to further elucidate AMS. Head CT negative and EEG without seizure activity.    MEDICATIONS  (STANDING):  acyclovir IVPB 950 milliGRAM(s) IV Intermittent every 12 hours  chlorhexidine 0.12% Liquid 15 milliLiter(s) Oral Mucosa every 12 hours  dextrose 40% Gel 15 Gram(s) Oral once  dextrose 40% Gel 15 Gram(s) Oral once  dextrose 5%. 1000 milliLiter(s) (50 mL/Hr) IV Continuous <Continuous>  dextrose 5%. 1000 milliLiter(s) (100 mL/Hr) IV Continuous <Continuous>  dextrose 50% Injectable 50 milliLiter(s) IV Push every 15 minutes  fentaNYL    Injectable 50 MICROGram(s) IV Push once  glucagon  Injectable 1 milliGRAM(s) IntraMuscular once  hydrocortisone sodium succinate Injectable 50 milliGRAM(s) IV Push every 12 hours  insulin glargine Injectable (LANTUS) 10 Unit(s) SubCutaneous at bedtime  insulin lispro (ADMELOG) corrective regimen sliding scale   SubCutaneous every 6 hours  levETIRAcetam  IVPB 500 milliGRAM(s) IV Intermittent every 12 hours  meropenem  IVPB 1000 milliGRAM(s) IV Intermittent every 12 hours  metoprolol tartrate 50 milliGRAM(s) Enteral Tube every 6 hours  pantoprazole  Injectable 40 milliGRAM(s) IV Push daily  potassium iodide 10%/iodine 5% Oral Liquid - Peds 0.25 milliLiter(s) Oral every 6 hours  propylthiouracil 200 milliGRAM(s) Oral every 8 hours  sodium chloride 0.45%. 1000 milliLiter(s) (100 mL/Hr) IV Continuous <Continuous>  vancomycin  IVPB      vancomycin  IVPB 500 milliGRAM(s) IV Intermittent every 12 hours    MEDICATIONS  (PRN):  acetaminophen    Suspension .. 650 milliGRAM(s) Oral every 6 hours PRN Temp greater or equal to 38C (100.4F)  albuterol/ipratropium for Nebulization. 3 milliLiter(s) Nebulizer every 6 hours PRN Bronchospasm  sodium chloride 0.9% lock flush 10 milliLiter(s) IV Push every 1 hour PRN Pre/post blood products, medications, blood draw, and to maintain line patency      Vital Signs Last 24 Hrs  T(C): 37.6 (20 Nov 2020 04:00), Max: 38.7 (19 Nov 2020 12:00)  T(F): 99.7 (20 Nov 2020 04:00), Max: 101.7 (19 Nov 2020 12:00)  HR: 119 (20 Nov 2020 07:23) (82 - 152)  BP: 172/86 (20 Nov 2020 07:23) (90/57 - 205/86)  BP(mean): 107 (20 Nov 2020 07:23) (67 - 131)  RR: 21 (20 Nov 2020 07:23) (20 - 35)  SpO2: 99% (20 Nov 2020 07:23) (94% - 100%)    Constitutional: NAD, unresponsive to voice or touch  Respiratory: intubated  Cardiovascular: tachy  Gastrointestinal: Soft, non-distended, non-tender  Musculoskeletal: b/l lower extremities warm and well perfused, palpable DP's b/l    I&O's Detail    19 Nov 2020 07:01  -  20 Nov 2020 07:00  --------------------------------------------------------  IN:    Enteral Tube Flush: 1000 mL    Glucerna: 640 mL    Insulin: 40.5 mL    IV PiggyBack: 50 mL    IV PiggyBack: 300 mL    IV PiggyBack: 100 mL    IV PiggyBack: 100 mL    IV PiggyBack: 250 mL    Lactated Ringers: 200 mL    PRBCs (Packed Red Blood Cells): 285 mL    sodium chloride 0.45%: 700 mL  Total IN: 3665.5 mL    OUT:    Indwelling Catheter - Urethral (mL): 2915 mL  Total OUT: 2915 mL    Total NET: 750.5 mL    LABS:                        8.7    41.48 )-----------( 137      ( 20 Nov 2020 04:33 )             27.0     11-20    151<H>  |  114<H>  |  85.0<H>  ----------------------------<  120<H>  4.0   |  30.0<H>  |  0.63    Ca    9.2      20 Nov 2020 04:32  Phos  3.4     11-20  Mg     2.3     11-20      PT/INR - ( 20 Nov 2020 04:32 )   PT: 16.6 sec;   INR: 1.46 ratio         PTT - ( 20 Nov 2020 04:32 )  PTT:23.0 sec

## 2020-11-20 NOTE — PROGRESS NOTE ADULT - SUBJECTIVE AND OBJECTIVE BOX
[facility], THE HEART CENTER                              [address] [place] [address]                                                 PHONE: [phone]                                                 FAX: [phone]  -----------------------------------------------------------------------------------------------  Pt seen and examined. FU for AF    Overnight events/Complaints: Pt remains intubated. Remains in AF.  Vital Signs Last 24 Hrs  T(C): 37.7 (20 Nov 2020 07:39), Max: 38.7 (19 Nov 2020 12:00)  T(F): 99.9 (20 Nov 2020 07:39), Max: 101.7 (19 Nov 2020 12:00)  HR: 141 (20 Nov 2020 09:00) (82 - 152)  BP: 103/61 (20 Nov 2020 09:00) (98/56 - 205/86)  BP(mean): 72 (20 Nov 2020 09:00) (71 - 131)  RR: 22 (20 Nov 2020 09:00) (20 - 35)  SpO2: 100% (20 Nov 2020 09:00) (94% - 100%)  I&O's Summary    19 Nov 2020 07:01  -  20 Nov 2020 07:00  --------------------------------------------------------  IN: 3665.5 mL / OUT: 2915 mL / NET: 750.5 mL    20 Nov 2020 07:01  -  20 Nov 2020 10:01  --------------------------------------------------------  IN: 200 mL / OUT: 230 mL / NET: -30 mL        PHYSICAL EXAM:  Constitutional: Thin female in bed intubated  HEENT:     Head: Normocephalic and atraumatic  Neck: supple. No JVD  Cardiovascular: irregular S1 S2 tachy  Respiratory: Lungs clear to auscultation; no crepitations, no wheeze  Gastrointestinal:  Soft, Non-tender, + BS	  Musculoskeletal: Normal range of motion. No edema  Skin: Warm and dry. No cyanosis . No diaphoresis.  Neurologic: Unable to assess        LABS:                        8.7    41.48 )-----------( 137      ( 20 Nov 2020 04:33 )             27.0     11-20    151<H>  |  114<H>  |  85.0<H>  ----------------------------<  120<H>  4.0   |  30.0<H>  |  0.63    Ca    9.2      20 Nov 2020 04:32  Phos  3.4     11-20  Mg     2.3     11-20      CARDIAC MARKERS ( 19 Nov 2020 17:06 )  x     / 0.07 ng/mL / 147 U/L / x     / x      CARDIAC MARKERS ( 19 Nov 2020 03:17 )  x     / 0.05 ng/mL / 205 U/L / x     / 1.3 ng/mL      PT/INR - ( 20 Nov 2020 04:32 )   PT: 16.6 sec;   INR: 1.46 ratio         PTT - ( 20 Nov 2020 04:32 )  PTT:23.0 sec    RADIOLOGY & ADDITIONAL STUDIES: (reviewed)  CXR was independently visualized/reviewed  and demonstrated: L sided effusion vs. infiltrate    CARDIOLOGY TESTING:(reviewed)     ECHOCARDIOGRAM independently visualized/reviewed and demonstrated : LVEF > 75%, moderate TR, mild mR    TELEMETRY independently visualized/reviewed and demonstrated : -130    STRESS TEST: 3/2019 normal perfusion     CARDIAC CATHETERIZATION: 2016 none obstructive CAD     MEDICATIONS:(reviewed)  MEDICATIONS  (STANDING):  acyclovir IVPB 650 milliGRAM(s) IV Intermittent every 8 hours  chlorhexidine 0.12% Liquid 15 milliLiter(s) Oral Mucosa every 12 hours  dextrose 40% Gel 15 Gram(s) Oral once  dextrose 40% Gel 15 Gram(s) Oral once  dextrose 50% Injectable 50 milliLiter(s) IV Push every 15 minutes  fentaNYL    Injectable 50 MICROGram(s) IV Push once  glucagon  Injectable 1 milliGRAM(s) IntraMuscular once  hydrocortisone sodium succinate Injectable 50 milliGRAM(s) IV Push every 12 hours  insulin glargine Injectable (LANTUS) 10 Unit(s) SubCutaneous at bedtime  insulin lispro (ADMELOG) corrective regimen sliding scale   SubCutaneous every 6 hours  levETIRAcetam  IVPB 500 milliGRAM(s) IV Intermittent every 12 hours  meropenem  IVPB 2000 milliGRAM(s) IV Intermittent every 8 hours  metoprolol tartrate 50 milliGRAM(s) Enteral Tube every 6 hours  pantoprazole  Injectable 40 milliGRAM(s) IV Push daily  potassium iodide 10%/iodine 5% Oral Liquid - Peds 0.25 milliLiter(s) Oral every 6 hours  propylthiouracil 200 milliGRAM(s) Oral every 8 hours  sodium chloride 0.45%. 1000 milliLiter(s) (100 mL/Hr) IV Continuous <Continuous>  vancomycin  IVPB 750 milliGRAM(s) IV Intermittent every 12 hours

## 2020-11-20 NOTE — PROGRESS NOTE ADULT - ASSESSMENT
Impression:  1. acute hypoxemic respiratory failure  2. thyroid storm  3. afib with RVR  4. anemia  5. hyperosmolar hypernatremia  6. AMS -suspected metabolic encephalopathy  7. hypokalemia  8. VASHTI-resolved  9. diabetes mellitus    Plan:  Neuro - Sedation neuromuscular blockade to facilitate safe ventilation    CV -  Pressor support as needed to maintain MAP 65           Avoiding fluid challenges          QTC monitoring while on Azithromycin and Hydroxychloroquine.    Pulm -  ARDS-NET 4-6cc/kg IBW TV as able to maintain plateau pressures <30               Prone ventilation consideration as feasible  Pa02/Fi02 < 150 on Fi02 >60% and PEEP at least 5                 Vent bundle Reviewed     GI -  PPI  Enteric feeds as tolerated in tandem with NMB and prone ventilation    Renal - Even to negative fluid balance as tolerated by hemodynamics and renal fx.  Feeds to be provided in lieu of IVF.     Heme -  Pharmacologic DVT PPx  in addition to SCD's    ID - ABX discontinuation based on discussion with ID in conjunction with clinical features, culture data, and judicious procalcitonin monitoring.      Endo -  Aggressive glycemic control to limit FS glucose to < 180mg/dl.            Impression:  1. acute hypoxemic respiratory failure  2. thyroid storm  3. afib with RVR  4. anemia -+FOB, possible acute blood loss  5. hyperosmolar hypernatremia  6. AMS -suspected metabolic encephalopathy  7. hypokalemia  8. VASHTI-resolved  9. diabetes mellitus  10. LE DVT    Plan:  Neuro - avoiding all neurosuppressants, T3/T4 levels improved, ammonia normal, EEG without seizure activity, check B12, unable to perform MRI due to PPM, CTH prior negative, at this point appears like metabolic encephalopathy, INR still                elevated so LP remains deferred, Acyclovir added to broad spectrum abx to cover encephalitis empirically.    CV -  afib with -150, EF hyperdynamic on Echo          BP stable         cont BB         add 1/2 digoxin load         keep K~4 and Mg>2 for optimal arrhythmia suppression         no AC due to prior H/H drop and +FOB    Pulm -  full vent support with LTV 6cc/kg IDW              actively titrating FiO2 for sats>90%             utilization of PEEP for alveolar recruitment for desats             SCX NGTD, remains on empiric IV abx for possible aspiration, CXR without defineable infiltrate             not a candidate for weaning given AMS and ongoing need for airway protection    GI -  PPI  Enteric feeds as tolerated, H/H improved following transfusion, if drops again will likely need EGD    Renal - Cr improved, although with significant uremia, ? prerenal component given hypernatremia, fluids initiated will change to 1/2 NS, increase free [place] to 250ml [id number], replete K, repeat BMP in am    Heme -  No AC for now given +FOB and dropping H/H prior, no active melena, +DVT, for IVC filter in am with Vascular, repeat CBC in am    ID - Cont broad spectrum IV abx, Acyclovir added, Bcx/SCx NG, u/a negative, check procalABX discontinuation based on discussion with ID in conjunction with clinical features, culture data, and judicious procalcitonin monitoring.      Endo -  Aggressive glycemic control to limit FS glucose to < 180mg/dl, lantus added with ISS coverage, off CII.            Impression:  1. acute hypoxemic respiratory failure  2. thyroid storm/thyrotoxicosis  3. afib with RVR  4. anemia -+FOB, possible acute blood loss  5. hyperosmolar hypernatremia  6. AMS -suspected metabolic encephalopathy  7. hypokalemia  8. VASHTI-resolved  9. diabetes mellitus  10. LE DVT    Plan:  Neuro - avoiding all neurosuppressants, T3/T4 levels improved, ammonia normal, EEG without seizure activity, check B12, unable to perform MRI due to PPM, CTH prior negative, at this point appears like metabolic encephalopathy, INR still                elevated so LP remains deferred, Acyclovir added to broad spectrum abx to cover encephalitis empirically.    CV -  afib with -150, EF hyperdynamic on Echo          BP stable         cont BB         add 1/2 digoxin load         keep K~4 and Mg>2 for optimal arrhythmia suppression         no AC due to prior H/H drop and +FOB    Pulm -  full vent support with LTV 6cc/kg IDW              actively titrating FiO2 for sats>90%             utilization of PEEP for alveolar recruitment for desats             SCX NGTD, remains on empiric IV abx for possible aspiration, CXR without defineable infiltrate             not a candidate for weaning given AMS and ongoing need for airway protection    GI -  PPI  Enteric feeds as tolerated, H/H improved following transfusion, if drops again will likely need EGD    Renal - Cr improved, although with significant uremia, ? prerenal component given hypernatremia, fluids initiated will change to 1/2 NS, increase free [place] to 250ml v0ptoxb, replete K, repeat BMP in am    Heme -  No AC for now given +FOB and dropping H/H prior, no active melena, +DVT, for IVC filter in am with Vascular, repeat CBC in am    ID - Cont broad spectrum IV abx, Acyclovir added, Bcx/SCx NG, u/a negative, check procalABX discontinuation based on discussion with ID in conjunction with clinical features, culture data, and judicious procalcitonin monitoring.      Endo -  Aggressive glycemic control to limit FS glucose to < 180mg/dl, lantus added with ISS coverage, off CII.

## 2020-11-20 NOTE — PROGRESS NOTE ADULT - ASSESSMENT
This 80 year old female with a pmhx of PPM, DM, HTN, hyperthyroid who presents with AMS. Per daughter she brought patient in as she has been having frequent falls at home. Over the past few days she noticed patient becoming more confused. Patient had fall (11/13/2020) and was unable to get up and was then brought in to Ed. Upon arrival noted to be SVT, HTN. TSH undetectable and had high t3/4. MICU then consulted.  (14 Nov 2020 23:26)    he was found with acute respiratory failure with hypoxia. Per team, aspiration event noted as well.   Images showed  L perihilar area with patchy infiltrates > R perihilar.  empiric antibiotics were started.     patient remains intubated under MICU care.  Thyroid storm also being managed by the ICU    Impression:  - acute respiratory failure  - lung infiltrates  - thyroid storm  - WBC elevation  - acute renal failure  - transaminitis  - shock      Plan:  - WBC still elevated, by Procalcitonin downtrending, with normal renal function    - continue Empiric antibiotics  agree with empiric ACYCLOVIR for now  LP in process    labs ordered      - follow up all outstanding cultures  - trend temperature and WBC curve  - repeat cultures from blood and all sources if febrile.    - multiple labs abnormal from shock    Serum BNP markedly elevated    Liver enzymes elevated  - likely related to sepsis.     Acute renal failure  - will watch closely         Consider goals of care

## 2020-11-20 NOTE — PROGRESS NOTE ADULT - SUBJECTIVE AND OBJECTIVE BOX
[facility]  INFECTIOUS DISEASES AND INTERNAL MEDICINE at [place]  =======================================================  Willam Luu MD  Diplomates American Board of Internal Medicine and Infectious Diseases  Tel  [phone]  Fax [phone]  =======================================================    [facility]-[id number]  SAULO LEROY  follow up:  lung infiltrates, fevers    patient seen/ examined  labs reviewed  still with marked WBC elevation  Procalcitonin decreased significantly  started on IV acyclovir by team.   possible LP per RN      =======================================================    REVIEW OF SYSTEMS:  Limited due to medical condition    =======================================================  Allergies  No Known Allergies     ======================================================  Physical Exam:  ============    General:  THIN FRAIL, sedated  Eye: Pupils are equal, round and reactive to light,  BILATERAL pterygium,  right WORSE THAN LEFT;  bilateral cataracts  HENT: Normocephalic, Oral mucosa is dry  ET Tube in place  Neck: Supple, No lymphadenopathy.  Respiratory: Lungs with fair air entry anteriorly  Cardiovascular: Normal rate, Regular rhythm,   Gastrointestinal: Soft,  Non-distended, Normal bowel sounds.  Genitourinary: PANG with clear urine  Lymphatics: No lymphadenopathy neck,   Musculoskeletal: no joint abnl  Integumentary: No rash.  Neurologic: sedated, no withdrawal to pain    =======================================================  Vitals:  ============  T(F): 100 (20 Nov 2020 12:04), Max: 101.1 (19 Nov 2020 15:30)  HR: 105 (20 Nov 2020 12:10)  BP: 159/60 (20 Nov 2020 12:00)  RR: 21 (20 Nov 2020 12:00)  SpO2: 100% (20 Nov 2020 12:10) (94% - 100%)  temp max in last 48H T(F): , Max: 101.7 (11-19-20 @ 12:00)    =======================================================  Current Antibiotics:  acyclovir IVPB 650 milliGRAM(s) IV Intermittent every 8 hours  meropenem  IVPB 2000 milliGRAM(s) IV Intermittent every 8 hours  vancomycin  IVPB 750 milliGRAM(s) IV Intermittent every 12 hours    Other medications:  chlorhexidine 0.12% Liquid 15 milliLiter(s) Oral Mucosa every 12 hours  dextrose 40% Gel 15 Gram(s) Oral once  dextrose 40% Gel 15 Gram(s) Oral once  dextrose 50% Injectable 50 milliLiter(s) IV Push every 15 minutes  doxazosin 1 milliGRAM(s) Oral at bedtime  fentaNYL    Injectable 50 MICROGram(s) IV Push once  glucagon  Injectable 1 milliGRAM(s) IntraMuscular once  hydrocortisone sodium succinate Injectable 50 milliGRAM(s) IV Push every 12 hours  insulin glargine Injectable (LANTUS) 10 Unit(s) SubCutaneous at bedtime  insulin lispro (ADMELOG) corrective regimen sliding scale   SubCutaneous every 6 hours  levETIRAcetam  IVPB 500 milliGRAM(s) IV Intermittent every 12 hours  metolazone 10 milliGRAM(s) Oral once  metoprolol tartrate 50 milliGRAM(s) Enteral Tube every 6 hours  pantoprazole  Injectable 40 milliGRAM(s) IV Push daily  potassium iodide 10%/iodine 5% Oral Liquid - Peds 0.25 milliLiter(s) Oral every 6 hours  propylthiouracil 200 milliGRAM(s) Oral every 8 hours  sodium chloride 0.45%. 1000 milliLiter(s) IV Continuous <Continuous>      =======================================================  Labs:                        8.7    41.48 )-----------( 137      ( 20 Nov 2020 04:33 )             27.0      11-20    151<H>  |  114<H>  |  85.0<H>  ----------------------------<  120<H>  4.0   |  30.0<H>  |  0.63    Ca    9.2      20 Nov 2020 04:32  Phos  3.4     11-20  Mg     2.3     11-20        Culture - Sputum (collected 11-19-20 @ 15:12)  Source: .Sputum Sputum  Gram Stain (11-19-20 @ 19:59):    Moderate polymorphonuclear leukocytes per low power field    Rare Squamous epithelial cells per low power field    No organisms seen per oil power field    GI PCR Panel, Stool (collected 11-18-20 @ 15:37)  Source: .Stool Feces  Final Report (11-18-20 @ 19:19):    GI PCR Results: NOT detected    *******Please Note:*******    GI panel PCR evaluates for:    Campylobacter, Plesiomonas shigelloides, Salmonella,    Vibrio, Yersinia enterocolitica, Enteroaggregative    Escherichia coli (EAEC), Enteropathogenic E.coli (EPEC),    Enterotoxigenic E. coli (ETEC) lt/st, Shiga-like    toxin-producing E. coli (STEC) stx1/stx2,    Shigella/ Enteroinvasive E. coli (EIEC), Cryptosporidium,    Cyclospora cayetanensis, Entamoeba histolytica,    Giardia lamblia, Adenovirus F 40/41, Astrovirus,    Norovirus GI/GII, Rotavirus A, Sapovirus    Culture - Sputum (collected 11-17-20 @ 22:03)  Source: .Sputum Sputum  Gram Stain (11-17-20 @ 23:42):    Few polymorphonuclear leukocytes per low power field    No Squamous epithelial cells per low power field    Few Yeast like cells per oil power field  Final Report (11-19-20 @ 17:18):    Normal Respiratory Priscilla present    Culture - Blood (collected 11-17-20 @ 11:37)  Source: .Blood Blood    Culture - Blood (collected 11-17-20 @ 10:37)  Source: .Blood Blood    Culture - Urine (collected 11-15-20 @ 01:27)  Source: .Urine Clean Catch (Midstream)  Final Report (11-16-20 @ 19:28):    >=3 organisms. Probable collection contamination.    Culture - Blood (collected 11-14-20 @ 21:42)  Source: .Blood Blood-Peripheral  Final Report (11-19-20 @ 23:00):    No growth at 5 days.    Culture - Blood (collected 11-14-20 @ 21:41)  Source: .Blood Blood-Peripheral  Final Report (11-19-20 @ 23:00):    No growth at 5 days.      Creatinine, Serum: 0.63 mg/dL (11-20-20 @ 04:32)  Creatinine, Serum: 0.62 mg/dL (11-20-20 @ 00:09)  Creatinine, Serum: 0.93 mg/dL (11-19-20 @ 03:17)  Creatinine, Serum: 1.36 mg/dL (11-18-20 @ 03:11)  Creatinine, Serum: 1.93 mg/dL (11-17-20 @ 04:36)  Creatinine, Serum: 1.93 mg/dL (11-16-20 @ 04:33)  Creatinine, Serum: 1.24 mg/dL (11-15-20 @ 14:09)    Procalcitonin, Serum: 8.23 ng/mL (11-20-20 @ 04:32)  Procalcitonin, Serum: 51.48 ng/mL (11-17-20 @ 04:36)       WBC Count: 41.48 K/uL (11-20-20 @ 04:33)  WBC Count: 34.30 K/uL (11-19-20 @ 17:07)  WBC Count: 29.30 K/uL (11-19-20 @ 03:17)  WBC Count: 20.16 K/uL (11-18-20 @ 03:11)  WBC Count: 16.08 K/uL (11-17-20 @ 04:36)  WBC Count: 11.72 K/uL (11-16-20 @ 04:33)  WBC Count: 7.78 K/uL (11-15-20 @ 14:09)      COVID-19 IgG Antibody Interpretation: Negative (11-15-20 @ 08:21)  COVID-19 IgG Antibody Index: <0.10 Index (11-15-20 @ 08:21)  COVID-19 PCR: NotDetec (11-14-20 @ 22:00)      Alkaline Phosphatase, Serum: 99 U/L (11-17-20 @ 04:36)  Alanine Aminotransferase (ALT/SGPT): 1234 U/L (11-17-20 @ 04:36)  Aspartate Aminotransferase (AST/SGOT): 1330 U/L (11-17-20 @ 04:36)  Bilirubin Total, Serum: 3.8 mg/dL (11-17-20 @ 04:36)  Bilirubin Direct, Serum: 2.7 mg/dL (11-17-20 @ 04:36)    < from: CT Head No Cont (11.16.20 @ 09:52) >     EXAM:  CT BRAIN                          PROCEDURE DATE:  11/16/2020          INTERPRETATION:  .    CLINICAL INFORMATION: Obtundation; initial CT yesterday without any acute changes, eval for delayed changes. Alteration of consciousness.    TECHNIQUE: Multiple axial CT images of the head were obtained without contrast. Sagittal and coronal reconstructed images were acquired from the source data.    COMPARISON: Most recent prior head CT study from 11/15/2020.    FINDINGS: There is no acute intracranial hemorrhage, mass effect, shift of the midline structures, herniation, extra-axial fluid collection, or hydrocephalus.    There is diffuse cerebral volume loss with prominence of the sulci, fissures, and cisternal spaces which is normal for the patient's age. There is moderate patchy confluent deep and periventricular white matter hypoattenuation statistically compatible with microvascular changes given calcific atherosclerotic disease of the intracranial arteries.    The paranasal sinusesand mastoid air cells are clear. The calvarium is intact. The imaged orbits are unremarkable.    IMPRESSION: No acute intracranial hemorrhage, mass effect, or shift of the midline structures.    Similar-appearing moderate severity chronic white matter microvascular type changes.            LIAM SEGURA MD; Attending Radiologist  This document has been electronically signed. Nov 16 2020 10:04AM    < end of copied text >

## 2020-11-20 NOTE — PROCEDURE NOTE - NSINDICATIONS_GEN_A_CORE
critical patient/mental status change
critical illness/emergency venous access/venous access
respiratory failure

## 2020-11-20 NOTE — PROCEDURE NOTE - NSPROCDETAILS_GEN_ALL_CORE
patient pre-oxygenated, tube inserted, placement confirmed/connected to ventilator
CSF Obtained/location identified, draped/prepped, sterile technique used, needle inserted/introduced/area cleaned in sterile fashion
ultrasound guidance/lumen(s) aspirated and flushed/guidewire recovered/sterile technique, catheter placed/sterile dressing applied

## 2020-11-20 NOTE — PROGRESS NOTE ADULT - SUBJECTIVE AND OBJECTIVE BOX
On Admission  11-14-20 (6d)  HPI:  Patient is a 80 year old female with a pmhx of PPM, DM, HTN, hyperthyroid who presents with AMS. Per daughter she brought patient in as she has been having frequent falls at home. Over the past few days she noticed patient becoming more confused. Patient had fall yesetrday and was unable to get up and was then brought in to Ed. Upon arrival noted to be SVT, HTN. TSH undetectable and had high t3/4. MICU then consulted.  (14 Nov 2020 23:26)    PAST MEDICAL & SURGICAL HISTORY:  HTN (hypertension)    DM (diabetes mellitus)    Pacemaker        Antimicrobial:  acyclovir IVPB 650 milliGRAM(s) IV Intermittent every 8 hours  meropenem  IVPB 2000 milliGRAM(s) IV Intermittent every 8 hours  vancomycin  IVPB 750 milliGRAM(s) IV Intermittent every 12 hours    Cardiovascular:  metoprolol tartrate 50 milliGRAM(s) Enteral Tube every 6 hours    Pulmonary:  albuterol/ipratropium for Nebulization. 3 milliLiter(s) Nebulizer every 6 hours PRN    Hematalogic:    Other:  acetaminophen    Suspension .. 650 milliGRAM(s) Oral every 6 hours PRN  chlorhexidine 0.12% Liquid 15 milliLiter(s) Oral Mucosa every 12 hours  dextrose 40% Gel 15 Gram(s) Oral once  dextrose 40% Gel 15 Gram(s) Oral once  dextrose 5%. 1000 milliLiter(s) IV Continuous <Continuous>  dextrose 5%. 1000 milliLiter(s) IV Continuous <Continuous>  dextrose 50% Injectable 50 milliLiter(s) IV Push every 15 minutes  fentaNYL    Injectable 50 MICROGram(s) IV Push once  glucagon  Injectable 1 milliGRAM(s) IntraMuscular once  hydrocortisone sodium succinate Injectable 50 milliGRAM(s) IV Push every 12 hours  insulin glargine Injectable (LANTUS) 10 Unit(s) SubCutaneous at bedtime  insulin lispro (ADMELOG) corrective regimen sliding scale   SubCutaneous every 6 hours  levETIRAcetam  IVPB 500 milliGRAM(s) IV Intermittent every 12 hours  pantoprazole  Injectable 40 milliGRAM(s) IV Push daily  potassium iodide 10%/iodine 5% Oral Liquid - Peds 0.25 milliLiter(s) Oral every 6 hours  propylthiouracil 200 milliGRAM(s) Oral every 8 hours  sodium chloride 0.45%. 1000 milliLiter(s) IV Continuous <Continuous>  sodium chloride 0.9% lock flush 10 milliLiter(s) IV Push every 1 hour PRN      Drug Dosing Weight  Height (cm): 162.6 (14 Nov 2020 21:02)  Weight (kg): 65.8 (15 Nov 2020 08:00)  BMI (kg/m2): 24.9 (15 Nov 2020 08:00)  BSA (m2): 1.71 (15 Nov 2020 08:00)    T(C): 37.7 (11-20-20 @ 07:39), Max: 38.7 (11-19-20 @ 12:00)  HR: 116 (11-20-20 @ 08:09)  BP: 172/86 (11-20-20 @ 07:23)  BP(mean): 107 (11-20-20 @ 07:23)  ABP: --  ABP(mean): --  RR: 21 (11-20-20 @ 07:23)  SpO2: 100% (11-20-20 @ 08:09)    ABG - ( 18 Nov 2020 12:25 )  pH, Arterial: 7.51  pH, Blood: x     /  pCO2: 37    /  pO2: 195   / HCO3: 30    / Base Excess: 6.2   /  SaO2: 99                    11-19 @ 07:01  -  11-20 @ 07:00  --------------------------------------------------------  IN: 3665.5 mL / OUT: 2915 mL / NET: 750.5 mL        Mode: AC/ CMV (Assist Control/ Continuous Mandatory Ventilation)  RR (machine): 20  TV (machine): 380  FiO2: 40  PEEP: 5  MAP: 10  PIP: 27        LABS:  CBC Full  -  ( 20 Nov 2020 04:33 )  WBC Count : 41.48 K/uL  RBC Count : 3.07 M/uL  Hemoglobin : 8.7 g/dL  Hematocrit : 27.0 %  Platelet Count - Automated : 137 K/uL  Mean Cell Volume : 87.9 fl  Mean Cell Hemoglobin : 28.3 pg  Mean Cell Hemoglobin Concentration : 32.2 gm/dL  Auto Neutrophil # : x  Auto Lymphocyte # : x  Auto Monocyte # : x  Auto Eosinophil # : x  Auto Basophil # : x  Auto Neutrophil % : x  Auto Lymphocyte % : x  Auto Monocyte % : x  Auto Eosinophil % : x  Auto Basophil % : x    11-20    151<H>  |  114<H>  |  85.0<H>  ----------------------------<  120<H>  4.0   |  30.0<H>  |  0.63    Ca    9.2      20 Nov 2020 04:32  Phos  3.4     11-20  Mg     2.3     11-20      PT/INR - ( 20 Nov 2020 04:32 )   PT: 16.6 sec;   INR: 1.46 ratio         PTT - ( 20 Nov 2020 04:32 )  PTT:23.0 sec    Culture Results:   GI PCR Results: NOT detected  *******Please Note:*******  GI panel PCR evaluates for:  Campylobacter, Plesiomonas shigelloides, Salmonella,  Vibrio, Yersinia enterocolitica, Enteroaggregative  Escherichia coli (EAEC), Enteropathogenic E.coli (EPEC),  Enterotoxigenic E. coli (ETEC) lt/st, Shiga-like  toxin-producing E. coli (STEC) stx1/stx2,  Shigella/ Enteroinvasive E. coli (EIEC), Cryptosporidium,  Cyclospora cayetanensis, Entamoeba histolytica,  Giardia lamblia, Adenovirus F 40/41, Astrovirus,  Norovirus GI/GII, Rotavirus A, Sapovirus (11-18 @ 15:37)  Culture Results:   Normal Respiratory Priscilla present (11-17 @ 22:03)  Culture Results:   No growth at 48 hours (11-17 @ 11:37)  Culture Results:   No growth at 48 hours (11-17 @ 10:37)    ____________________________________________________________________________________________________

## 2020-11-20 NOTE — PROGRESS NOTE ADULT - SUBJECTIVE AND OBJECTIVE BOX
Patient is a 80y old  Female who presents with a chief complaint of AMS (2020 20:25)      BRIEF HOSPITAL COURSE:   80F with PMHx of DM, HTN, PPM, hyperthyroidism who presented with AMS/frequent falls. Found to have SVT. TSH undetectable. Started     Patient is a 80 year old female with a pmhx of PPM, DM, HTN, hyperthyroid who presents with AMS. Per daughter she brought patient in as she has been having frequent falls at home. Over the past few days she noticed patient becoming more confused. Patient had fall yesetrday and was unable to get up and was then brought in to Ed. Upon arrival noted to be SVT, HTN. TSH undetectable and had high t3/4. MICU then consulted.  (2020 23:26)      Events last 24 hours: ***    PAST MEDICAL & SURGICAL HISTORY:  HTN (hypertension)    DM (diabetes mellitus)    Pacemaker        Review of Systems:  unable to perform at this time, pt with cognitive impairment on vent      Medications:  acyclovir IVPB 950 milliGRAM(s) IV Intermittent every 12 hours  meropenem  IVPB 1000 milliGRAM(s) IV Intermittent every 12 hours  vancomycin  IVPB      vancomycin  IVPB 500 milliGRAM(s) IV Intermittent every 12 hours    digoxin  Injectable 0.25 milliGRAM(s) IV Push every 4 hours  metoprolol tartrate 50 milliGRAM(s) Enteral Tube every 6 hours    albuterol/ipratropium for Nebulization. 3 milliLiter(s) Nebulizer every 6 hours PRN    acetaminophen    Suspension .. 650 milliGRAM(s) Oral every 6 hours PRN  fentaNYL    Injectable 50 MICROGram(s) IV Push once  levETIRAcetam  IVPB 500 milliGRAM(s) IV Intermittent every 12 hours        pantoprazole  Injectable 40 milliGRAM(s) IV Push daily      dextrose 40% Gel 15 Gram(s) Oral once  dextrose 40% Gel 15 Gram(s) Oral once  dextrose 50% Injectable 50 milliLiter(s) IV Push every 15 minutes  glucagon  Injectable 1 milliGRAM(s) IntraMuscular once  hydrocortisone sodium succinate Injectable 50 milliGRAM(s) IV Push every 12 hours  insulin glargine Injectable (LANTUS) 10 Unit(s) SubCutaneous at bedtime  insulin lispro (ADMELOG) corrective regimen sliding scale   SubCutaneous every 6 hours  propylthiouracil 200 milliGRAM(s) Oral every 8 hours    dextrose 5%. 1000 milliLiter(s) IV Continuous <Continuous>  dextrose 5%. 1000 milliLiter(s) IV Continuous <Continuous>  potassium chloride  10 mEq/100 mL IVPB 10 milliEquivalent(s) IV Intermittent every 1 hour  potassium iodide 10%/iodine 5% Oral Liquid - Peds 0.25 milliLiter(s) Oral every 6 hours  sodium chloride 0.45%. 1000 milliLiter(s) IV Continuous <Continuous>  sodium chloride 0.9% lock flush 10 milliLiter(s) IV Push every 1 hour PRN      chlorhexidine 0.12% Liquid 15 milliLiter(s) Oral Mucosa every 12 hours        Mode: AC/ CMV (Assist Control/ Continuous Mandatory Ventilation)  RR (machine): 20  TV (machine): 380  FiO2: 50  PEEP: 5  MAP: 8  PIP: 28      ICU Vital Signs Last 24 Hrs  T(C): 37.4 (2020 00:00), Max: 38.7 (2020 12:00)  T(F): 99.3 (2020 00:00), Max: 101.7 (2020 12:00)  HR: 100 (2020 00:28) (80 - 144)  BP: 166/104 (2020 00:00) (90/57 - 205/86)  BP(mean): 121 (2020 00:00) (67 - 123)  ABP: --  ABP(mean): --  RR: 25 (2020 00:00) (20 - 35)  SpO2: 100% (2020 00:28) (96% - 100%)      ABG - ( 2020 12:25 )  pH, Arterial: 7.51  pH, Blood: x     /  pCO2: 37    /  pO2: 195   / HCO3: 30    / Base Excess: 6.2   /  SaO2: 99          I&O's Summary    2020 07:01  -  2020 07:00  --------------------------------------------------------  IN: 3017.5 mL / OUT: 2620 mL / NET: 397.5 mL    2020 07:01  -  2020 01:26  --------------------------------------------------------  IN: 2065.5 mL / OUT: 2050 mL / NET: 15.5 mL                  LABS:                        9.1    34.30 )-----------( 126      ( 2020 17:07 )             28.4     11-20    151<H>  |  113<H>  |  86.0<H>  ----------------------------<  158<H>  3.2<L>   |  29.0  |  0.62    Ca    9.3      2020 00:09  Phos  2.7       Mg     2.4     11-20        CARDIAC MARKERS ( 2020 17:06 )  x     / 0.07 ng/mL / 147 U/L / x     / x      CARDIAC MARKERS ( 2020 03:17 )  x     / 0.05 ng/mL / 205 U/L / x     / 1.3 ng/mL      CAPILLARY BLOOD GLUCOSE      POCT Blood Glucose.: 172 mg/dL (2020 23:57)    PT/INR - ( 2020 03:17 )   PT: 17.8 sec;   INR: 1.57 ratio         PTT - ( 2020 03:17 )  PTT:24.1 sec    CULTURES:  Culture Results:   GI PCR Results: NOT detected  *******Please Note:*******  GI panel PCR evaluates for:  Campylobacter, Plesiomonas shigelloides, Salmonella,  Vibrio, Yersinia enterocolitica, Enteroaggregative  Escherichia coli (EAEC), Enteropathogenic E.coli (EPEC),  Enterotoxigenic E. coli (ETEC) lt/st, Shiga-like  toxin-producing E. coli (STEC) stx1/stx2,  Shigella/ Enteroinvasive E. coli (EIEC), Cryptosporidium,  Cyclospora cayetanensis, Entamoeba histolytica,  Giardia lamblia, Adenovirus F 40/41, Astrovirus,  Norovirus GI/GII, Rotavirus A, Sapovirus ( @ 15:37)  C Diff by PCR Result: NotDetec (11-18 @ 03:11)  Culture Results:   Normal Respiratory Priscilla present ( @ 22:03)  Culture Results:   No growth at 48 hours ( @ 11:37)  Culture Results:   No growth at 48 hours ( @ 10:37)  Culture Results:   >=3 organisms. Probable collection contamination. (11-15 @ 01:27)  Culture Results:   No growth at 5 days. ( @ 21:42)  Culture Results:   No growth at 5 days. ( @ 21:41)      Physical Examination:    General: No acute distress, on vent, no response to noxious stimuli, + gag with suctioning    HEENT: Pupils equal, reactive to light.  Symmetric, scler anicteric    PULM: Course BS bilaterally, no wheezing appreciated, no significant sputum production    CVS: irregular rate and rhythm    ABD: Soft, nondistended, normoactive bowel sounds    EXT: No edema, no deformities    SKIN: Warm and well perfused, no rashes noted.    RADIOLOGY:   EXAM:  CT BRAIN                          PROCEDURE DATE:  2020          INTERPRETATION:  .    CLINICAL INFORMATION: Obtundation; initial CT yesterday without any acute changes, eval for delayed changes. Alteration of consciousness.    TECHNIQUE: Multiple axial CT images of the head were obtained without contrast. Sagittal and coronal reconstructed images were acquired from the source data.    COMPARISON: Most recent prior head CT study from 11/15/2020.    FINDINGS: There is no acute intracranial hemorrhage, mass effect, shift of the midline structures, herniation, extra-axial fluid collection, or hydrocephalus.    There is diffuse cerebral volume loss with prominence of the sulci, fissures, and cisternal spaces which is normal for the patient's age. There is moderate patchy confluent deep and periventricular white matter hypoattenuation statistically compatible with microvascular changes given calcific atherosclerotic disease of the intracranial arteries.    The paranasal sinuses and mastoid air cells are clear. The calvarium is intact. The imaged orbits are unremarkable.    IMPRESSION: No acute intracranial hemorrhage, mass effect, or shift of the midline structures.    Similar-appearing moderate severity chronic white matter microvascular type changes.            LIAM SEGURA MD; Attending Radiologist  This document has been electronically signed. 2020 10:04AM  EXAM:  XR CHEST PORTABLE IMMED 1V                          PROCEDURE DATE:  2020          INTERPRETATION:  Portable chest radiograph    CLINICAL INFORMATION:   Thyroid storm. Fevers. Sepsis.    TECHNIQUE:  Portable  AP view of the chest was obtained.    COMPARISON: 2020 chest available for review.    FINDINGS: ET tube tip above tracheal bifurcation.  NG tube tip beyond GE junction.  RIGHT IJ catheter tip in SVC.   The lungs show new LEFT lower lobe retrocardiac airspace consolidation and/or effusion obscuring LEFT diaphragm contour. LEFT upper lobe and RIGHT lung parenchyma clear.    . No pneumothorax.    The heart and mediastinum are within normal limits.  Cardiac device wire leads are within right atrium and right ventricle.    Visualized osseous structures are intact.        IMPRESSION:   LEFT lower lobe retrocardiac airspace consolidation and/or effusion.  Catheters and tubes in place..                ANDREW LUKE MD; Attending Radiologist  This document has been electronically signed. 2020 10:16AM    EXAM:  US DPLX LWR EXT VEINS COMPL BI                          PROCEDURE DATE:  2020          INTERPRETATION:  CLINICAL INFORMATION: Patient is on ventilator. Tachycardia. Hypoxia.    COMPARISON: None available.    TECHNIQUE: Duplex sonography of the BILATERAL LOWER extremity veins with color and spectral Doppler, with and without compression.    FINDINGS:    There is occlusive thrombus seen within the left popliteal vein.    Otherwise, there is normal compressibility, color and spectral Doppler of the bilateral common femoral, bilateral femoral and right popliteal veins.    Posterior tibial veins are patent bilaterally. Bilateral peroneal veins are not visualized.    There is a 3.4 x 1.4 x 2.1 cm left popliteal cyst.    IMPRESSION:  1. Positive for occlusive deep venous thrombosis in the left popliteal vein.      TEOFILO Marks notified on 2020 at 3:05 AM Eastern time.            ABIODUN MURRAY MD; Attending Radiologist  This document has been electronically signed. 2020  3:08AM    EXAM:  ECHO TTE WITH CON COMP W DOPP      PROCEDURE DATE:  2020   .      INTERPRETATION:  REPORT:  TRANSTHORACIC ECHOCARDIOGRAM REPORT        Patient Name:   SAULO LEROY Patient Location: Inpatient  Medical Rec #:  [id number]    Accession #:      [id number]  Account #:                    Height:           63.0 in 160.0 cm  YOB: 1940    Weight:           143.3 lb 65.00 kg  Patient Age:    80 years      BSA:              1.68 m²  Patient Gender: F             BP:               93/50 mmHg      Date of Exam:        2020 8:25:16 PM  Sonographer:         Rakan Dias  Referring Physician: Jatinder Glasgow MD    Procedure:     2D Echo/Doppler/Color Doppler Complete.  Indications:   Shock, unspecified - R57.9  Diagnosis:     Shortness of breath - R06.02  Study Details: Technically difficult study. Study quality was adversely affected                 due to body habitus, the patient being intubated, lung                 interference and patient not able to turn on to left lateral                 decubitus.        2D AND M-MODE MEASUREMENTS (normal ranges within parentheses):  Left Ventricle:                  Normal   Aorta/Left Atrium:          Normal  IVSd (2D):              1.32 cm (0.7-1.1) Aortic Root (2D):  3.83 cm (2.4-3.7)  LVPWd (2D):             1.18 cm (0.7-1.1) Left Atrium (2D):  3.22 cm (1.9-4.0)  LVIDd (2D):             3.70 cm (3.4-5.7)  LVIDs (2D):             2.54 cm  LV FS (2D):             31.4 %   (>25%)  Relative Wall Thickness  0.64    (<0.42)    LV DIASTOLIC FUNCTION:  MV Peak E: 0.89 m/s Decel Time: 175 msec    SPECTRAL DOPPLER ANALYSIS (where applicable):  Mitral Valve:  MV P1/2 Time: 50.75 msec  MV Area, PHT: 4.33 cm²    Aortic Valve: AoV Max Nicolás: 1.60 m/s AoV Peak PG: 10.2 mmHg AoV Mean PG:    LVOT Vmax: 0.89 m/s LVOT VTI:  LVOT Diameter:    Tricuspid Valve and PA/RV Systolic Pressure: TR Max Velocity: 2.73 m/s RA Pressure: 8 mmHg RVSP/PASP: 37.8 mmHg    Pulmonic Valve:  PV Max Velocity: 0.66 m/s PV Max P.7 mmHg PV Mean PG:      PHYSICIAN INTERPRETATION:  Left Ventricle: The left ventricular internal cavity size is normal.  Global LV systolic function was hyperdynamic. Left ventricular ejection fraction, by visual estimation, is >75%. The mitral in-flow pattern reveals no discernable A-wave, therefore no comment on diastolic function can be made.  Right Ventricle: The right ventricular size is normal. RV systolic function is normal.  Left Atrium: Moderately enlarged left atrium.  Right Atrium: Severely enlarged right atrium.  Pericardium: There is no evidence of pericardial effusion.  Mitral Valve: Thickening and calcification of the anterior and posterior mitral valve leaflets. There is mild mitral annular calcification. Mild mitral valve regurgitation is seen.  Tricuspid Valve: The tricuspid valve is not well seen. Moderate tricuspid regurgitation is visualized. Estimated pulmonary artery systolic pressure is 37.8 mmHg assuming a right atrial pressure of 8 mmHg, which is consistent with borderline pulmonary hypertension.  Aortic Valve: The aortic valve was not well visualized. Sclerotic aortic valve with normal opening. No evidence of aortic valve regurgitation is seen.  Pulmonic Valve: The pulmonic valve was not well visualized. Trace pulmonic valve regurgitation.  Aorta: Dilateed Asc Ao at 3.8 cm. Dilated Ao root at 3.8cm.  Pulmonary Artery: The pulmonary artery is of normal size and origin.  Venous: The pulmonary veins were not well visualized. Patient on Mechanical ventilation unable to assess Right Atrial pressure.      Summary:   1. Left ventricular ejection fraction, by visual estimation, is >75%.   2. Hyperdynamic global left ventricular systolic function.   3. Severely enlarged right atrium.   4. The mitral in-flow pattern reveals no discernable A-wave, therefore no comment on diastolic function can be made.   5. There is mild concentric left ventricular hypertrophy.   6. Moderately enlarged left atrium.   7. Mild mitral annular calcification.   8. Mild mitral valve regurgitation.   9. Thickening and calcification of the anterior and posterior mitral valve leaflets.  10. Moderate tricuspid regurgitation.  11. Sclerotic aortic valve with normal opening.  12. Dilateed Asc Ao at 3.8 cm. Dilated Ao root at 3.8cm.  13. Estimated pulmonary artery systolic pressure is 37.8 mmHg assuming a right atrial pressure of 8 mmHg, which is consistent with borderline pulmonary hypertension.    MD Angélica Electronically signed on 2020 at 10:09:29 AM            *** Final ***              THEA ALMEIDA D.O., ATTENDING CARDIOLOGIST  This document has been electronically signed. 2020  8:25PM      CRITICAL CARE TIME SPENT:35 mins assessing presenting problems of acute illness that poses high probability of life threatening deterioration or end organ damage/dysfunction.  Medical decision making including Initiating plan of care, reviewing data, reviewing radiology, direct patient bedside evaluation and interpretation of vital signs, any necessary ventilator management , discussion with multidisciplinary team,  all non inclusive of procedures    Patient is a 80y old  Female who presents with a chief complaint of AMS (2020 20:25)      BRIEF HOSPITAL COURSE:   80F with PMHx of DM, HTN, PPM, hyperthyroidism who presented with AMS/frequent falls. Found to have SVT. TSH undetectable. Started on PTU, steroids. Worsening AMS while on floor intubated for hypoxic respiratory failure due to pulm edema. Transferred to ICU. Course further complicated by afib with RVR, ongoing AMS, VASHTI, anemia.    Events last 24 hours: afebrile, no improvement in MS, remains in afib with rates up to 140s-150s, no pressors, received pRBCs yesterday with improvement in H/H.     PAST MEDICAL & SURGICAL HISTORY:  HTN (hypertension)    DM (diabetes mellitus)    Pacemaker        Review of Systems:  unable to perform at this time, pt with cognitive impairment on vent      Medications:  acyclovir IVPB 950 milliGRAM(s) IV Intermittent every 12 hours  meropenem  IVPB 1000 milliGRAM(s) IV Intermittent every 12 hours  vancomycin  IVPB      vancomycin  IVPB 500 milliGRAM(s) IV Intermittent every 12 hours    digoxin  Injectable 0.25 milliGRAM(s) IV Push every 4 hours  metoprolol tartrate 50 milliGRAM(s) Enteral Tube every 6 hours    albuterol/ipratropium for Nebulization. 3 milliLiter(s) Nebulizer every 6 hours PRN    acetaminophen    Suspension .. 650 milliGRAM(s) Oral every 6 hours PRN  fentaNYL    Injectable 50 MICROGram(s) IV Push once  levETIRAcetam  IVPB 500 milliGRAM(s) IV Intermittent every 12 hours        pantoprazole  Injectable 40 milliGRAM(s) IV Push daily      dextrose 40% Gel 15 Gram(s) Oral once  dextrose 40% Gel 15 Gram(s) Oral once  dextrose 50% Injectable 50 milliLiter(s) IV Push every 15 minutes  glucagon  Injectable 1 milliGRAM(s) IntraMuscular once  hydrocortisone sodium succinate Injectable 50 milliGRAM(s) IV Push every 12 hours  insulin glargine Injectable (LANTUS) 10 Unit(s) SubCutaneous at bedtime  insulin lispro (ADMELOG) corrective regimen sliding scale   SubCutaneous every 6 hours  propylthiouracil 200 milliGRAM(s) Oral every 8 hours    dextrose 5%. 1000 milliLiter(s) IV Continuous <Continuous>  dextrose 5%. 1000 milliLiter(s) IV Continuous <Continuous>  potassium chloride  10 mEq/100 mL IVPB 10 milliEquivalent(s) IV Intermittent every 1 hour  potassium iodide 10%/iodine 5% Oral Liquid - Peds 0.25 milliLiter(s) Oral every 6 hours  sodium chloride 0.45%. 1000 milliLiter(s) IV Continuous <Continuous>  sodium chloride 0.9% lock flush 10 milliLiter(s) IV Push every 1 hour PRN      chlorhexidine 0.12% Liquid 15 milliLiter(s) Oral Mucosa every 12 hours        Mode: AC/ CMV (Assist Control/ Continuous Mandatory Ventilation)  RR (machine): 20  TV (machine): 380  FiO2: 50  PEEP: 5  MAP: 8  PIP: 28      ICU Vital Signs Last 24 Hrs  T(C): 37.4 (2020 00:00), Max: 38.7 (2020 12:00)  T(F): 99.3 (2020 00:00), Max: 101.7 (2020 12:00)  HR: 100 (2020 00:28) (80 - 144)  BP: 166/104 (2020 00:00) (90/57 - 205/86)  BP(mean): 121 (2020 00:00) (67 - 123)  ABP: --  ABP(mean): --  RR: 25 (2020 00:00) (20 - 35)  SpO2: 100% (2020 00:28) (96% - 100%)      ABG - ( 2020 12:25 )  pH, Arterial: 7.51  pH, Blood: x     /  pCO2: 37    /  pO2: 195   / HCO3: 30    / Base Excess: 6.2   /  SaO2: 99          I&O's Summary    2020 07:01  -  2020 07:00  --------------------------------------------------------  IN: 3017.5 mL / OUT: 2620 mL / NET: 397.5 mL    2020 07:01  -  2020 01:26  --------------------------------------------------------  IN: 2065.5 mL / OUT: 2050 mL / NET: 15.5 mL                  LABS:                        9.1    34.30 )-----------( 126      ( 2020 17:07 )             28.4     11-20    151<H>  |  113<H>  |  86.0<H>  ----------------------------<  158<H>  3.2<L>   |  29.0  |  0.62    Ca    9.3      2020 00:09  Phos  2.7       Mg     2.4     20        CARDIAC MARKERS ( 2020 17:06 )  x     / 0.07 ng/mL / 147 U/L / x     / x      CARDIAC MARKERS ( 2020 03:17 )  x     / 0.05 ng/mL / 205 U/L / x     / 1.3 ng/mL      CAPILLARY BLOOD GLUCOSE      POCT Blood Glucose.: 172 mg/dL (2020 23:57)    PT/INR - ( 2020 03:17 )   PT: 17.8 sec;   INR: 1.57 ratio         PTT - ( 2020 03:17 )  PTT:24.1 sec    CULTURES:  Culture Results:   GI PCR Results: NOT detected  *******Please Note:*******  GI panel PCR evaluates for:  Campylobacter, Plesiomonas shigelloides, Salmonella,  Vibrio, Yersinia enterocolitica, Enteroaggregative  Escherichia coli (EAEC), Enteropathogenic E.coli (EPEC),  Enterotoxigenic E. coli (ETEC) lt/st, Shiga-like  toxin-producing E. coli (STEC) stx1/stx2,  Shigella/ Enteroinvasive E. coli (EIEC), Cryptosporidium,  Cyclospora cayetanensis, Entamoeba histolytica,  Giardia lamblia, Adenovirus F 40/41, Astrovirus,  Norovirus GI/GII, Rotavirus A, Sapovirus ( @ 15:37)  C Diff by PCR Result: NotDetec ( @ 03:11)  Culture Results:   Normal Respiratory Priscilla present ( @ 22:03)  Culture Results:   No growth at 48 hours ( @ 11:37)  Culture Results:   No growth at 48 hours ( @ 10:37)  Culture Results:   >=3 organisms. Probable collection contamination. (11-15 @ 01:27)  Culture Results:   No growth at 5 days. ( @ 21:42)  Culture Results:   No growth at 5 days. ( @ 21:41)      Physical Examination:    General: No acute distress, on vent, no response to noxious stimuli, + gag with suctioning    HEENT: Pupils equal, reactive to light.  Symmetric, scler anicteric    PULM: Course BS bilaterally, no wheezing appreciated, no significant sputum production    CVS: irregular rate and rhythm    ABD: Soft, nondistended, normoactive bowel sounds    EXT: No edema, no deformities    SKIN: Warm and well perfused, no rashes noted.    RADIOLOGY:   EXAM:  CT BRAIN                          PROCEDURE DATE:  2020          INTERPRETATION:  .    CLINICAL INFORMATION: Obtundation; initial CT yesterday without any acute changes, eval for delayed changes. Alteration of consciousness.    TECHNIQUE: Multiple axial CT images of the head were obtained without contrast. Sagittal and coronal reconstructed images were acquired from the source data.    COMPARISON: Most recent prior head CT study from 11/15/2020.    FINDINGS: There is no acute intracranial hemorrhage, mass effect, shift of the midline structures, herniation, extra-axial fluid collection, or hydrocephalus.    There is diffuse cerebral volume loss with prominence of the sulci, fissures, and cisternal spaces which is normal for the patient's age. There is moderate patchy confluent deep and periventricular white matter hypoattenuation statistically compatible with microvascular changes given calcific atherosclerotic disease of the intracranial arteries.    The paranasal sinuses and mastoid air cells are clear. The calvarium is intact. The imaged orbits are unremarkable.    IMPRESSION: No acute intracranial hemorrhage, mass effect, or shift of the midline structures.    Similar-appearing moderate severity chronic white matter microvascular type changes.            LIAM SEGURA MD; Attending Radiologist  This document has been electronically signed. 2020 10:04AM  EXAM:  XR CHEST PORTABLE IMMED 1V                          PROCEDURE DATE:  2020          INTERPRETATION:  Portable chest radiograph    CLINICAL INFORMATION:   Thyroid storm. Fevers. Sepsis.    TECHNIQUE:  Portable  AP view of the chest was obtained.    COMPARISON: 2020 chest available for review.    FINDINGS: ET tube tip above tracheal bifurcation.  NG tube tip beyond GE junction.  RIGHT IJ catheter tip in SVC.   The lungs show new LEFT lower lobe retrocardiac airspace consolidation and/or effusion obscuring LEFT diaphragm contour. LEFT upper lobe and RIGHT lung parenchyma clear.    . No pneumothorax.    The heart and mediastinum are within normal limits.  Cardiac device wire leads are within right atrium and right ventricle.    Visualized osseous structures are intact.        IMPRESSION:   LEFT lower lobe retrocardiac airspace consolidation and/or effusion.  Catheters and tubes in place..                ANDREW LUKE MD; Attending Radiologist  This document has been electronically signed. 2020 10:16AM    EXAM:  US DPLX LWR EXT VEINS COMPL BI                          PROCEDURE DATE:  2020          INTERPRETATION:  CLINICAL INFORMATION: Patient is on ventilator. Tachycardia. Hypoxia.    COMPARISON: None available.    TECHNIQUE: Duplex sonography of the BILATERAL LOWER extremity veins with color and spectral Doppler, with and without compression.    FINDINGS:    There is occlusive thrombus seen within the left popliteal vein.    Otherwise, there is normal compressibility, color and spectral Doppler of the bilateral common femoral, bilateral femoral and right popliteal veins.    Posterior tibial veins are patent bilaterally. Bilateral peroneal veins are not visualized.    There is a 3.4 x 1.4 x 2.1 cm left popliteal cyst.    IMPRESSION:  1. Positive for occlusive deep venous thrombosis in the left popliteal vein.      TEOFILO Marks notified on 2020 at 3:05 AM Eastern time.            ABIODUN MURRAY MD; Attending Radiologist  This document has been electronically signed. 2020  3:08AM    EXAM:  ECHO TTE WITH CON COMP W DOPP      PROCEDURE DATE:  2020   .      INTERPRETATION:  REPORT:  TRANSTHORACIC ECHOCARDIOGRAM REPORT        Patient Name:   SAULO LEROY Patient Location: Inpatient  Medical Rec #:  [id number]    Accession #:      [id number]  Account #:                    Height:           63.0 in 160.0 cm  YOB: 1940    Weight:           143.3 lb 65.00 kg  Patient Age:    80 years      BSA:              1.68 m²  Patient Gender: F             BP:               93/50 mmHg      Date of Exam:        2020 8:25:16 PM  Sonographer:         Rakan Dias  Referring Physician: Jatinder Glasgow MD    Procedure:     2D Echo/Doppler/Color Doppler Complete.  Indications:   Shock, unspecified - R57.9  Diagnosis:     Shortness of breath - R06.02  Study Details: Technically difficult study. Study quality was adversely affected                 due to body habitus, the patient being intubated, lung                 interference and patient not able to turn on to left lateral                 decubitus.        2D AND M-MODE MEASUREMENTS (normal ranges within parentheses):  Left Ventricle:                  Normal   Aorta/Left Atrium:          Normal  IVSd (2D):              1.32 cm (0.7-1.1) Aortic Root (2D):  3.83 cm (2.4-3.7)  LVPWd (2D):             1.18 cm (0.7-1.1) Left Atrium (2D):  3.22 cm (1.9-4.0)  LVIDd (2D):             3.70 cm (3.4-5.7)  LVIDs (2D):             2.54 cm  LV FS (2D):             31.4 %   (>25%)  Relative Wall Thickness  0.64    (<0.42)    LV DIASTOLIC FUNCTION:  MV Peak E: 0.89 m/s Decel Time: 175 msec    SPECTRAL DOPPLER ANALYSIS (where applicable):  Mitral Valve:  MV P1/2 Time: 50.75 msec  MV Area, PHT: 4.33 cm²    Aortic Valve: AoV Max Nicolás: 1.60 m/s AoV Peak PG: 10.2 mmHg AoV Mean PG:    LVOT Vmax: 0.89 m/s LVOT VTI:  LVOT Diameter:    Tricuspid Valve and PA/RV Systolic Pressure: TR Max Velocity: 2.73 m/s RA Pressure: 8 mmHg RVSP/PASP: 37.8 mmHg    Pulmonic Valve:  PV Max Velocity: 0.66 m/s PV Max P.7 mmHg PV Mean PG:      PHYSICIAN INTERPRETATION:  Left Ventricle: The left ventricular internal cavity size is normal.  Global LV systolic function was hyperdynamic. Left ventricular ejection fraction, by visual estimation, is >75%. The mitral in-flow pattern reveals no discernable A-wave, therefore no comment on diastolic function can be made.  Right Ventricle: The right ventricular size is normal. RV systolic function is normal.  Left Atrium: Moderately enlarged left atrium.  Right Atrium: Severely enlarged right atrium.  Pericardium: There is no evidence of pericardial effusion.  Mitral Valve: Thickening and calcification of the anterior and posterior mitral valve leaflets. There is mild mitral annular calcification. Mild mitral valve regurgitation is seen.  Tricuspid Valve: The tricuspid valve is not well seen. Moderate tricuspid regurgitation is visualized. Estimated pulmonary artery systolic pressure is 37.8 mmHg assuming a right atrial pressure of 8 mmHg, which is consistent with borderline pulmonary hypertension.  Aortic Valve: The aortic valve was not well visualized. Sclerotic aortic valve with normal opening. No evidence of aortic valve regurgitation is seen.  Pulmonic Valve: The pulmonic valve was not well visualized. Trace pulmonic valve regurgitation.  Aorta: Dilateed Asc Ao at 3.8 cm. Dilated Ao root at 3.8cm.  Pulmonary Artery: The pulmonary artery is of normal size and origin.  Venous: The pulmonary veins were not well visualized. Patient on Mechanical ventilation unable to assess Right Atrial pressure.      Summary:   1. Left ventricular ejection fraction, by visual estimation, is >75%.   2. Hyperdynamic global left ventricular systolic function.   3. Severely enlarged right atrium.   4. The mitral in-flow pattern reveals no discernable A-wave, therefore no comment on diastolic function can be made.   5. There is mild concentric left ventricular hypertrophy.   6. Moderately enlarged left atrium.   7. Mild mitral annular calcification.   8. Mild mitral valve regurgitation.   9. Thickening and calcification of the anterior and posterior mitral valve leaflets.  10. Moderate tricuspid regurgitation.  11. Sclerotic aortic valve with normal opening.  12. Dilateed Asc Ao at 3.8 cm. Dilated Ao root at 3.8cm.  13. Estimated pulmonary artery systolic pressure is 37.8 mmHg assuming a right atrial pressure of 8 mmHg, which is consistent with borderline pulmonary hypertension.    MD Angélica Electronically signed on 2020 at 10:09:29 AM            *** Final ***              THEA ALMEIDA D.O., ATTENDING CARDIOLOGIST  This document has been electronically signed. 2020  8:25PM      CRITICAL CARE TIME SPENT:35 mins assessing presenting problems of acute illness that poses high probability of life threatening deterioration or end organ damage/dysfunction.  Medical decision making including Initiating plan of care, reviewing data, reviewing radiology, direct patient bedside evaluation and interpretation of vital signs, any necessary ventilator management , discussion with multidisciplinary team,  all non inclusive of procedures

## 2020-11-20 NOTE — PROGRESS NOTE ADULT - ASSESSMENT
79 yo F with CAD, DM, HTN, Hyperthyroid who presented d/t AMS. Under care of MICU, Vasc surgery consulted overnight for IVC filter.   Lower extremity duplex showing occlusive DVT in left popliteal vein. Hgb 8.7 this am, WBC of >40,000  Pt with many comorbidities, not deemed a good surgical candidate at this time, OR case cancelled for today. If primary team still wants IVC filter, may consult IR.   No acute surgical needs at this time. Please consult for any new concerns.

## 2020-11-21 NOTE — PROGRESS NOTE ADULT - ASSESSMENT
80y Female with history of DM HTN HLD none obstructive CAD prior cath 2016 but Nuclear stress test 2019 normal perfusion SSS s/p PPM who presents to ED AMS and frequent falls at home.  The patient was found have thyroid storm and was admitted to MICU for acute respiratory failure likely due to ASP PNA.  Severe sepsis VASHTI and shock liver.  The patient was found to have new onset AF RVR  TTE hyperdynamic LV EF > 75%.  Not a good candidate for long term AC due to bleeding and fall risk.  Patient is DNR; HR 's with stable SBP; + DVT anemia Hg 6.5 ; VASHTI improving        PAF now back in atrial paced rhythm. Not on anticoagulation due to anemia  On PTU, hydrocortisone for hyperthyroidism  Care as per MICU team

## 2020-11-21 NOTE — PROGRESS NOTE ADULT - ASSESSMENT
This 80 year old female with a pmhx of PPM, DM, HTN, hyperthyroid who presents with AMS. Per daughter she brought patient in as she has been having frequent falls at home. Over the past few days she noticed patient becoming more confused. Patient had fall (11/13/2020) and was unable to get up and was then brought in to Ed. Upon arrival noted to be SVT, HTN. TSH undetectable and had high t3/4. MICU then consulted.  (14 Nov 2020 23:26)    he was found with acute respiratory failure with hypoxia. Per team, aspiration event noted as well.   Images showed  L perihilar area with patchy infiltrates > R perihilar.  empiric antibiotics were started.     patient remains intubated under MICU care.  Thyroid storm also being managed by the ICU    Impression:  - acute respiratory failure  - multifocal PNA  - thyroid storm  - WBC elevation  - acute renal failure  - transaminitis  - shock      Plan:  - WBC elevation down trending today  - Procalcitonin downtrending, with normal renal function    - s/p LP  - no clear evidence of meningitis. stopping acyclovir 11/21    CT chest with pulm infiltrates, multifocal PNA  - continue antibiotics for this  - check sputum cx       - follow up all outstanding cultures  - trend temperature and WBC curve  - repeat cultures from blood and all sources if febrile.         Liver enzymes elevated  - will trend    Acute renal failure  - will watch closely         Consider goals of care

## 2020-11-21 NOTE — PROGRESS NOTE ADULT - ASSESSMENT
Impression:  1. acute hypoxemic respiratory failure  2. thyroid storm/thyrotoxicosis  3. afib with RVR  4. anemia -+FOB, possible acute blood loss  5. hyperosmolar hypernatremia  6. AMS -suspected metabolic encephalopathy  7. hypokalemia  8. VASHTI  9. diabetes mellitus  10. LE DVT  11. lobar PNA    Plan:  Neuro - avoiding all neurosuppressants, T3/T4 levels improved, ammonia normal, EEG without seizure activity, B12 ok, unable to perform MRI due to PPM, CTH prior negative, LP without orgs on gm stain, at this point appears like               metabolic encephalopathy, cont emiric IV abx for possible encephalitis/meningitis until cxs finalized    CV -  afib with improved rate control, EF hyperdynamic on Echo          BP stable         cont BB         received partial dig load, will add daily dig for improvement in control, goal HR<110 consistently         keep K~4 and Mg>2 for optimal arrhythmia suppression         no AC due to prior H/H drop and +FOB    Pulm -  full vent support with LTV 6cc/kg IDW              actively titrating FiO2 for sats>90%             utilization of PEEP for alveolar recruitment for desats            CT chest shows lobar PNA, repeat SCx sent with lavage            cont empiric IV abx coverage             not a candidate for weaning given AMS and ongoing need for airway protection    GI -  PPI  Enteric feeds as tolerated, H/H improved following transfusion, if drops again will likely need EGD    Renal - Cr improved, although with no improvement in uremia, ? prerenal component given hypernatremia, fluids initiated will change to 1/2 NS given free increased prior, cont free water, repeat BMP in am    Heme -  No AC for now given +FOB and dropping H/H prior, no active melena, +DVT, for IVC filter when infection cleared, repeat CBC in am    ID - Cont broad spectrum IV abx, Acyclovir added, Bcx/SCx NG, u/a negative, CT with lobar PNA, ABX discontinuation based on discussion with ID in conjunction with clinical features, culture data    Endo -  Aggressive glycemic control to limit FS glucose to < 180mg/dl, BS elevated, lantus increased, cont coverage, cont PTU, cont weaning off steroids

## 2020-11-21 NOTE — CHART NOTE - NSCHARTNOTESELECT_GEN_ALL_CORE
Malnutrition Notification
Nutrition Services
EEG Prelim
Event Note
Off Service Note

## 2020-11-21 NOTE — PROVIDER CONTACT NOTE (CRITICAL VALUE NOTIFICATION) - TEST AND RESULT REPORTED:
Hemoglobin 6.5
Potassium 2.8
WBC 41.48
lactate 3.6
Pt lives alone in building apt w/ elevators. Pt states that he was independent in ADLs and mobility prior to admission. No prior use/possession of DMEs/AEs per Pt.

## 2020-11-21 NOTE — PROGRESS NOTE ADULT - SUBJECTIVE AND OBJECTIVE BOX
Patient is a 80y old  Female who presents with a chief complaint of AMS (2020 10:01)      BRIEF HOSPITAL COURSE:  80F with PMHx of DM, HTN, PPM, hyperthyroidism who presented with AMS/frequent falls. Found to have SVT. TSH undetectable. Started on PTU, steroids. Worsening AMS while on floor intubated for hypoxic respiratory failure due to pulm edema. Transferred to ICU. Course further complicated by afib with RVR, ongoing AMS, VASHTI, anemia.    Events last 24 hours: low grade temp 100, no improvement in MS, remains in afib wth improved rate control, no pressors, H/H stable, LP performed yesterday, CT chest with lobar PNA      PAST MEDICAL & SURGICAL HISTORY:  HTN (hypertension)    DM (diabetes mellitus)    Pacemaker        Review of Systems:  unable to perform at this time, pt with cognitive impairment on vent      Medications:  acyclovir IVPB 650 milliGRAM(s) IV Intermittent every 8 hours  meropenem  IVPB 2000 milliGRAM(s) IV Intermittent every 8 hours  vancomycin  IVPB 750 milliGRAM(s) IV Intermittent every 12 hours    doxazosin 1 milliGRAM(s) Oral at bedtime  metoprolol tartrate 50 milliGRAM(s) Enteral Tube every 6 hours    albuterol/ipratropium for Nebulization. 3 milliLiter(s) Nebulizer every 6 hours PRN    acetaminophen    Suspension .. 650 milliGRAM(s) Oral every 6 hours PRN  fentaNYL    Injectable 50 MICROGram(s) IV Push once  levETIRAcetam  IVPB 500 milliGRAM(s) IV Intermittent every 12 hours        pantoprazole  Injectable 40 milliGRAM(s) IV Push daily      dextrose 40% Gel 15 Gram(s) Oral once  dextrose 40% Gel 15 Gram(s) Oral once  dextrose 50% Injectable 50 milliLiter(s) IV Push every 15 minutes  glucagon  Injectable 1 milliGRAM(s) IntraMuscular once  hydrocortisone sodium succinate Injectable 50 milliGRAM(s) IV Push every 12 hours  insulin glargine Injectable (LANTUS) 15 Unit(s) SubCutaneous at bedtime  insulin lispro (ADMELOG) corrective regimen sliding scale   SubCutaneous every 6 hours  propylthiouracil 200 milliGRAM(s) Oral every 8 hours    potassium iodide 10%/iodine 5% Oral Liquid - Peds 0.25 milliLiter(s) Oral every 6 hours  sodium chloride 0.45%. 1000 milliLiter(s) IV Continuous <Continuous>  sodium chloride 0.9% lock flush 10 milliLiter(s) IV Push every 1 hour PRN      chlorhexidine 0.12% Liquid 15 milliLiter(s) Oral Mucosa every 12 hours        Mode: AC/ CMV (Assist Control/ Continuous Mandatory Ventilation)  RR (machine): 20  TV (machine): 380  FiO2: 30  PEEP: 5  MAP: 9  PIP: 26      ICU Vital Signs Last 24 Hrs  T(C): 37.4 (2020 00:00), Max: 37.8 (2020 12:04)  T(F): 99.3 (2020 00:00), Max: 100 (2020 12:04)  HR: 79 (2020 03:00) (72 - 141)  BP: 139/55 (:) (102/46 - 174/94)  BP(mean): 79 (2020 03:00) (61 - 131)  ABP: --  ABP(mean): --  RR: 20 (2020 21:00) (20 - 24)  SpO2: 99% (2020 03:00) (94% - 100%)      I&O's Summary    :  -  2020 07:00  --------------------------------------------------------  IN: 3665.5 mL / OUT: 2915 mL / NET: 750.5 mL    :  -  2020 03:09  --------------------------------------------------------  IN: 2790 mL / OUT: 1405 mL / NET: 1385 mL              LABS:                        8.7    41.48 )-----------( 137      ( 2020 04:33 )             27.0     11-20    151<H>  |  114<H>  |  85.0<H>  ----------------------------<  120<H>  4.0   |  30.0<H>  |  0.63    Ca    9.2      2020 04:32  Phos  3.4     11  Mg     2.3     11-20        CARDIAC MARKERS ( 2020 17:06 )  x     / 0.07 ng/mL / 147 U/L / x     / x      CARDIAC MARKERS ( 2020 03:17 )  x     / 0.05 ng/mL / 205 U/L / x     / 1.3 ng/mL      CAPILLARY BLOOD GLUCOSE      POCT Blood Glucose.: 341 mg/dL (2020 00:00)    PT/INR - ( 2020 04:32 )   PT: 16.6 sec;   INR: 1.46 ratio         PTT - ( 2020 04:32 )  PTT:23.0 sec    CULTURES:  Culture Results:   Normal Respiratory Priscilla present ( @ 15:12)  Culture Results:   GI PCR Results: NOT detected  *******Please Note:*******  GI panel PCR evaluates for:  Campylobacter, Plesiomonas shigelloides, Salmonella,  Vibrio, Yersinia enterocolitica, Enteroaggregative  Escherichia coli (EAEC), Enteropathogenic E.coli (EPEC),  Enterotoxigenic E. coli (ETEC) lt/st, Shiga-like  toxin-producing E. coli (STEC) stx1/stx2,  Shigella/ Enteroinvasive E. coli (EIEC), Cryptosporidium,  Cyclospora cayetanensis, Entamoeba histolytica,  Giardia lamblia, Adenovirus F 40/41, Astrovirus,  Norovirus GI/GII, Rotavirus A, Sapovirus ( @ 15:37)  C Diff by PCR Result: NotDetec ( @ 03:11)  Culture Results:   Normal Respiratory Priscilla present ( @ 22:03)  Culture Results:   No growth at 48 hours ( @ 11:37)  Culture Results:   No growth at 48 hours ( @ 10:37)  Culture Results:   >=3 organisms. Probable collection contamination. (11-15 @ 01:27)  Culture Results:   No growth at 5 days. ( @ 21:42)  Culture Results:   No growth at 5 days. ( @ 21:41)    Physical Examination:    General: No acute distress, on vent, no response to noxious stimuli, + gag with suctioning    HEENT: Pupils equal, reactive to light.  Symmetric, scler anicteric    PULM: Course BS bilaterally, no wheezing appreciated, thick yellow secretions    CVS: irregular rate and rhythm    ABD: Soft, nondistended, normoactive bowel sounds    EXT: No edema, no deformities    SKIN: Warm and well perfused, no rashes noted.    RADIOLOGY:   EXAM:  CT ABDOMEN AND PELVIS                         EXAM:  CT CHEST                          PROCEDURE DATE:  2020          INTERPRETATION:  CT CHEST, CT ABDOMEN AND PELVIS    CLINICAL INFORMATION: fever, thyrotoxicosis, atrial fibrillation, GI bleed    COMPARISON: CT chest/abdomen/pelvis 10/24/2017    PROCEDURE:  CT of the Chest, Abdomen and Pelvis was performed without intravenous contrast.  Intravenous contrast: None  Oral contrast: None.  Sagittal and coronal reformats were performed.    FINDINGS:    CHEST:    LUNGS, AIRWAYS: The tip of the endotracheal tube is above the kevin. The central airways are patent. Moderate consolidation in the left lower lobe with additional patchy airspace disease throughout the remainder of the lungs.  PLEURA: No pleural abnormality.  VESSELS: Normal caliber aorta.  HEART: Normal heart size. No pericardial effusion. Left dual-lead pacer.  MEDIASTINUM AND TARA: No adenopathy.  CHEST WALL: No masses.    ABDOMEN AND PELVIS:    LIVER: Normal.  BILE DUCTS: Nondilated.  GALLBLADDER: Normal.  SPLEEN: Normal.  PANCREAS: Normal.  ADRENALS: Normal.  KIDNEYS/URETERS: No hydronephrosis or urinary tract calculi.    BLADDER: Small air in the lumen.  REPRODUCTIVE ORGANS: Multifibroid uterus.    BOWEL: No bowel-related abnormality. Specifically, no bowel obstruction. The NG tube is in the stomach.  PERITONEUM: No free air or ascites. No collection.  VESSELS:  Aortoiliac atherosclerosis without aneurysm.  RETROPERITONEUM/LYMPH NODES: No adenopathy.  ABDOMINAL WALL: Normal.  BONES: No aggressive lesion.    IMPRESSION:    Multifocal pneumonia.    No acute abdominal pathology. No collection.            ORIN GALLAGHER MD; Attending Radiologist  This document has been electronically signed. 2020  5:59PM    EXAM:  CT BRAIN                          PROCEDURE DATE:  2020          INTERPRETATION:  .    CLINICAL INFORMATION: Obtundation; initial CT yesterday without any acute changes, eval for delayed changes. Alteration of consciousness.    TECHNIQUE: Multiple axial CT images of the head were obtained without contrast. Sagittal and coronal reconstructed images were acquired from the source data.    COMPARISON: Most recent prior head CT study from 11/15/2020.    FINDINGS: There is no acute intracranial hemorrhage, mass effect, shift of the midline structures, herniation, extra-axial fluid collection, or hydrocephalus.    There is diffuse cerebral volume loss with prominence of the sulci, fissures, and cisternal spaces which is normal for the patient's age. There is moderate patchy confluent deep and periventricular white matter hypoattenuation statistically compatible with microvascular changes given calcific atherosclerotic disease of the intracranial arteries.    The paranasal sinuses and mastoid air cells are clear. The calvarium is intact. The imaged orbits are unremarkable.    IMPRESSION: No acute intracranial hemorrhage, mass effect, or shift of the midline structures.    Similar-appearing moderate severity chronic white matter microvascular type changes.            LIAM SEGURA MD; Attending Radiologist  This document has been electronically signed. 2020 10:04AM  EXAM:  XR CHEST PORTABLE IMMED 1V                          PROCEDURE DATE:  2020          INTERPRETATION:  Portable chest radiograph    CLINICAL INFORMATION:   Thyroid storm. Fevers. Sepsis.    TECHNIQUE:  Portable  AP view of the chest was obtained.    COMPARISON: 2020 chest available for review.    FINDINGS: ET tube tip above tracheal bifurcation.  NG tube tip beyond GE junction.  RIGHT IJ catheter tip in SVC.   The lungs show new LEFT lower lobe retrocardiac airspace consolidation and/or effusion obscuring LEFT diaphragm contour. LEFT upper lobe and RIGHT lung parenchyma clear.    . No pneumothorax.    The heart and mediastinum are within normal limits.  Cardiac device wire leads are within right atrium and right ventricle.    Visualized osseous structures are intact.        IMPRESSION:   LEFT lower lobe retrocardiac airspace consolidation and/or effusion.  Catheters and tubes in place..                ANDREW LUKE MD; Attending Radiologist  This document has been electronically signed. 2020 10:16AM    EXAM:  US DPLX LWR EXT VEINS COMPL BI                          PROCEDURE DATE:  2020          INTERPRETATION:  CLINICAL INFORMATION: Patient is on ventilator. Tachycardia. Hypoxia.    COMPARISON: None available.    TECHNIQUE: Duplex sonography of the BILATERAL LOWER extremity veins with color and spectral Doppler, with and without compression.    FINDINGS:    There is occlusive thrombus seen within the left popliteal vein.    Otherwise, there is normal compressibility, color and spectral Doppler of the bilateral common femoral, bilateral femoral and right popliteal veins.    Posterior tibial veins are patent bilaterally. Bilateral peroneal veins are not visualized.    There is a 3.4 x 1.4 x 2.1 cm left popliteal cyst.    IMPRESSION:  1. Positive for occlusive deep venous thrombosis in the left popliteal vein.      TEOFILO Marks notified on 2020 at 3:05 AM Eastern time.            ABIODUN MURRAY MD; Attending Radiologist  This document has been electronically signed. 2020  3:08AM    EXAM:  ECHO TTE WITH CON COMP W DOPP      PROCEDURE DATE:  2020   .      INTERPRETATION:  REPORT:  TRANSTHORACIC ECHOCARDIOGRAM REPORT        Patient Name:   SAULO LEROY Patient Location: Inpatient  Medical Rec #:  [id number]    Accession #:      [id number]  Account #:                    Height:           63.0 in 160.0 cm  YOB: 1940    Weight:           143.3 lb 65.00 kg  Patient Age:    80 years      BSA:              1.68 m²  Patient Gender: F             BP:               93/50 mmHg      Date of Exam:        2020 8:25:16 PM  Sonographer:         Rakan Dias  Referring Physician: Jatinder Glasgow MD    Procedure:     2D Echo/Doppler/Color Doppler Complete.  Indications:   Shock, unspecified - R57.9  Diagnosis:     Shortness of breath - R06.02  Study Details: Technically difficult study. Study quality was adversely affected                 due to body habitus, the patient being intubated, lung                 interference and patient not able to turn on to left lateral                 decubitus.        2D AND M-MODE MEASUREMENTS (normal ranges within parentheses):  Left Ventricle:                  Normal   Aorta/Left Atrium:          Normal  IVSd (2D):              1.32 cm (0.7-1.1) Aortic Root (2D):  3.83 cm (2.4-3.7)  LVPWd (2D):             1.18 cm (0.7-1.1) Left Atrium (2D):  3.22 cm (1.9-4.0)  LVIDd (2D):             3.70 cm (3.4-5.7)  LVIDs (2D):             2.54 cm  LV FS (2D):             31.4 %   (>25%)  Relative Wall Thickness  0.64    (<0.42)    LV DIASTOLIC FUNCTION:  MV Peak E: 0.89 m/s Decel Time: 175 msec    SPECTRAL DOPPLER ANALYSIS (where applicable):  Mitral Valve:  MV P1/2 Time: 50.75 msec  MV Area, PHT: 4.33 cm²    Aortic Valve: AoV Max Nicolás: 1.60 m/s AoV Peak PG: 10.2 mmHg AoV Mean PG:    LVOT Vmax: 0.89 m/s LVOT VTI:  LVOT Diameter:    Tricuspid Valve and PA/RV Systolic Pressure: TR Max Velocity: 2.73 m/s RA Pressure: 8 mmHg RVSP/PASP: 37.8 mmHg    Pulmonic Valve:  PV Max Velocity: 0.66 m/s PV Max P.7 mmHg PV Mean PG:      PHYSICIAN INTERPRETATION:  Left Ventricle: The left ventricular internal cavity size is normal.  Global LV systolic function was hyperdynamic. Left ventricular ejection fraction, by visual estimation, is >75%. The mitral in-flow pattern reveals no discernable A-wave, therefore no comment on diastolic function can be made.  Right Ventricle: The right ventricular size is normal. RV systolic function is normal.  Left Atrium: Moderately enlarged left atrium.  Right Atrium: Severely enlarged right atrium.  Pericardium: There is no evidence of pericardial effusion.  Mitral Valve: Thickening and calcification of the anterior and posterior mitral valve leaflets. There is mild mitral annular calcification. Mild mitral valve regurgitation is seen.  Tricuspid Valve: The tricuspid valve is not well seen. Moderate tricuspid regurgitation is visualized. Estimated pulmonary artery systolic pressure is 37.8 mmHg assuming a right atrial pressure of 8 mmHg, which is consistent with borderline pulmonary hypertension.  Aortic Valve: The aortic valve was not well visualized. Sclerotic aortic valve with normal opening. No evidence of aortic valve regurgitation is seen.  Pulmonic Valve: The pulmonic valve was not well visualized. Trace pulmonic valve regurgitation.  Aorta: Dilateed Asc Ao at 3.8 cm. Dilated Ao root at 3.8cm.  Pulmonary Artery: The pulmonary artery is of normal size and origin.  Venous: The pulmonary veins were not well visualized. Patient on Mechanical ventilation unable to assess Right Atrial pressure.      Summary:   1. Left ventricular ejection fraction, by visual estimation, is >75%.   2. Hyperdynamic global left ventricular systolic function.   3. Severely enlarged right atrium.   4. The mitral in-flow pattern reveals no discernable A-wave, therefore no comment on diastolic function can be made.   5. There is mild concentric left ventricular hypertrophy.   6. Moderately enlarged left atrium.   7. Mild mitral annular calcification.   8. Mild mitral valve regurgitation.   9. Thickening and calcification of the anterior and posterior mitral valve leaflets.  10. Moderate tricuspid regurgitation.  11. Sclerotic aortic valve with normal opening.  12. Dilateed Asc Ao at 3.8 cm. Dilated Ao root at 3.8cm.  13. Estimated pulmonary artery systolic pressure is 37.8 mmHg assuming a right atrial pressure of 8 mmHg, which is consistent with borderline pulmonary hypertension.    MD Angélica Electronically signed on 2020 at 10:09:29 AM            *** Final ***              THEA ALMEIDA D.O., ATTENDING CARDIOLOGIST  This document has been electronically signed. 2020  8:25PM      CRITICAL CARE TIME SPENT:35 mins assessing presenting problems of acute illness that poses high probability of life threatening deterioration or end organ damage/dysfunction.  Medical decision making including Initiating plan of care, reviewing data, reviewing radiology, direct patient bedside evaluation and interpretation of vital signs, any necessary ventilator management , discussion with multidisciplinary team,  all non inclusive of procedures

## 2020-11-21 NOTE — PROGRESS NOTE ADULT - SUBJECTIVE AND OBJECTIVE BOX
[facility]                              [address] [place] [address]                                                 PHONE: [phone]                                                 FAX: [phone]  -----------------------------------------------------------------------------------------------  Pt seen and examined. FU for AF    Overnight events/Complaints: Pt remains intubated. Back in atrial paced rhythm    Vital Signs Last 24 Hrs  T(C): 36.9 (21 Nov 2020 08:00), Max: 37.8 (20 Nov 2020 12:04)  T(F): 98.5 (21 Nov 2020 08:00), Max: 100 (20 Nov 2020 12:04)  HR: 60 (21 Nov 2020 09:00) (60 - 135)  BP: 133/50 (21 Nov 2020 09:00) (102/46 - 162/85)  BP(mean): 73 (21 Nov 2020 09:00) (61 - 107)  RR: 22 (21 Nov 2020 09:00) (20 - 24)  SpO2: 100% (21 Nov 2020 09:00) (98% - 100%)  I&O's Summary    20 Nov 2020 07:01  -  21 Nov 2020 07:00  --------------------------------------------------------  IN: 5270 mL / OUT: 1805 mL / NET: 3465 mL    21 Nov 2020 07:01  -  21 Nov 2020 10:10  --------------------------------------------------------  IN: 280 mL / OUT: 0 mL / NET: 280 mL      PHYSICAL EXAM:  Constitutional: Thin female in bed intubated  HEENT:     Head: Normocephalic and atraumatic  Neck: supple. No JVD  Cardiovascular: S1, S2 bradycardia  Respiratory: Lungs clear to auscultation; no crepitations, no wheeze  Gastrointestinal:  Soft, Non-tender, + BS	  Musculoskeletal: Normal range of motion. No edema  Skin: Warm and dry. No cyanosis . No diaphoresis.  Neurologic: Unable to assess        LABS:                        7.3    28.90 )-----------( 128      ( 21 Nov 2020 04:06 )             23.8     11-21    151<H>  |  113<H>  |  83.0<H>  ----------------------------<  250<H>  2.6<LL>   |  28.0  |  0.71    Ca    8.6      21 Nov 2020 04:06  Phos  3.9     11-21  Mg     2.0     11-21    TPro  4.4<L>  /  Alb  1.9<L>  /  TBili  1.5  /  DBili  x   /  AST  60<H>  /  ALT  161<H>  /  AlkPhos  173<H>  11-21    CARDIAC MARKERS ( 19 Nov 2020 17:06 )  x     / 0.07 ng/mL / 147 U/L / x     / x          PT/INR - ( 21 Nov 2020 04:06 )   PT: 17.2 sec;   INR: 1.51 ratio         PTT - ( 21 Nov 2020 04:06 )  PTT:21.7 sec        RADIOLOGY & ADDITIONAL STUDIES: (reviewed)  CXR was independently visualized/reviewed  and demonstrated: L sided effusion vs. 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     MEDICATIONS:(reviewed)  MEDICATIONS  (STANDING):  acyclovir IVPB 650 milliGRAM(s) IV Intermittent every 8 hours  chlorhexidine 0.12% Liquid 15 milliLiter(s) Oral Mucosa every 12 hours  dextrose 40% Gel 15 Gram(s) Oral once  dextrose 40% Gel 15 Gram(s) Oral once  dextrose 50% Injectable 50 milliLiter(s) IV Push every 15 minutes  digoxin     Tablet 0.125 milliGRAM(s) Oral daily  doxazosin 1 milliGRAM(s) Oral at bedtime  fentaNYL    Injectable 50 MICROGram(s) IV Push once  glucagon  Injectable 1 milliGRAM(s) IntraMuscular once  hydrocortisone sodium succinate Injectable 50 milliGRAM(s) IV Push daily  insulin glargine Injectable (LANTUS) 15 Unit(s) SubCutaneous at bedtime  insulin lispro (ADMELOG) corrective regimen sliding scale   SubCutaneous every 6 hours  levETIRAcetam  IVPB 500 milliGRAM(s) IV Intermittent every 12 hours  meropenem  IVPB 2000 milliGRAM(s) IV Intermittent every 8 hours  metoprolol tartrate 50 milliGRAM(s) Enteral Tube every 6 hours  pantoprazole  Injectable 40 milliGRAM(s) IV Push daily  potassium chloride  10 mEq/100 mL IVPB 10 milliEquivalent(s) IV Intermittent every 1 hour  potassium iodide 10%/iodine 5% Oral Liquid - Peds 0.25 milliLiter(s) Oral every 6 hours  propylthiouracil 200 milliGRAM(s) Oral every 8 hours  sodium chloride 0.45%. 1000 milliLiter(s) (100 mL/Hr) IV Continuous <Continuous>  vancomycin  IVPB 750 milliGRAM(s) IV Intermittent every 12 hours

## 2020-11-21 NOTE — CHART NOTE - TREATMENT: THE FOLLOWING DIET HAS BEEN RECOMMENDED
Diet, NPO with Tube Feed:   Tube Feeding Modality: Nasogastric  Glucerna 1.5 Rick (GLUCERNA1.5)  Continuous  Starting Tube Feed Rate {mL per Hour}: 20  Increase Tube Feed Rate by (mL): 10     Every 4 hours  Until Goal Tube Feed Rate (mL per Hour): 40  Tube Feed Duration (in Hours): 24  Tube Feed Start Time: 19:00 (11-15-20 @ 18:50) [Active]

## 2020-11-21 NOTE — PROGRESS NOTE ADULT - SUBJECTIVE AND OBJECTIVE BOX
[facility]  INFECTIOUS DISEASES AND INTERNAL MEDICINE at [place]  =======================================================  Willam Luu MD  Diplomates American Board of Internal Medicine and Infectious Diseases  Tel  [phone]  Fax [phone]  =======================================================    [id number]  SAULO LEROY  follow up:  multifocal PNA    remains in MICU  intubated  S/p LP     =======================================================    REVIEW OF SYSTEMS:  Limited due to medical condition    =======================================================  Allergies  No Known Allergies     ======================================================  Physical Exam:  ============    General:  THIN FRAIL, sedated  Eye: Pupils are equal, round and reactive to light,  BILATERAL pterygium,  right WORSE THAN LEFT;  bilateral cataracts  HENT: Normocephalic, Oral mucosa is dry  ET Tube in place  Neck: Supple, No lymphadenopathy.  Respiratory: Lungs with fair air entry anteriorly  Cardiovascular: Normal rate, Regular rhythm,   Gastrointestinal: Soft,  Non-distended, Normal bowel sounds.  Genitourinary: PANG with clear urine  Lymphatics: No lymphadenopathy neck,   Musculoskeletal: no joint abnl  Integumentary: No rash.  Neurologic: sedated, no withdrawal to pain, UNCHANGED    =======================================================   Vitals:  ============  T(F): 98 (21 Nov 2020 12:00), Max: 99.3 (21 Nov 2020 00:00)  HR: 61 (21 Nov 2020 13:00)  BP: 130/51 (21 Nov 2020 13:00)  RR: 20 (21 Nov 2020 13:00)  SpO2: 99% (21 Nov 2020 13:00) (98% - 100%)  temp max in last 48H T(F): , Max: 101.1 (11-19-20 @ 15:30)    =======================================================  Current Antibiotics:  acyclovir IVPB 650 milliGRAM(s) IV Intermittent every 8 hours  meropenem  IVPB 2000 milliGRAM(s) IV Intermittent every 8 hours  vancomycin  IVPB 750 milliGRAM(s) IV Intermittent every 12 hours    Other medications:  chlorhexidine 0.12% Liquid 15 milliLiter(s) Oral Mucosa every 12 hours  dextrose 40% Gel 15 Gram(s) Oral once  dextrose 40% Gel 15 Gram(s) Oral once  dextrose 50% Injectable 50 milliLiter(s) IV Push every 15 minutes  digoxin     Tablet 0.125 milliGRAM(s) Oral daily  doxazosin 1 milliGRAM(s) Oral at bedtime  fentaNYL    Injectable 50 MICROGram(s) IV Push once  glucagon  Injectable 1 milliGRAM(s) IntraMuscular once  hydrocortisone sodium succinate Injectable 50 milliGRAM(s) IV Push daily  insulin glargine Injectable (LANTUS) 15 Unit(s) SubCutaneous at bedtime  insulin lispro (ADMELOG) corrective regimen sliding scale   SubCutaneous every 6 hours  levETIRAcetam  IVPB 500 milliGRAM(s) IV Intermittent every 12 hours  metoprolol tartrate 50 milliGRAM(s) Enteral Tube every 6 hours  pantoprazole  Injectable 40 milliGRAM(s) IV Push daily  potassium iodide 10%/iodine 5% Oral Liquid - Peds 0.25 milliLiter(s) Oral every 6 hours  propylthiouracil 200 milliGRAM(s) Oral every 8 hours  sodium chloride 0.45%. 1000 milliLiter(s) IV Continuous <Continuous>      =======================================================  Labs:                        7.3    28.90 )-----------( 128      ( 21 Nov 2020 04:06 )             23.8      11-21    151<H>  |  113<H>  |  83.0<H>  ----------------------------<  250<H>  2.6<LL>   |  28.0  |  0.71    Ca    8.6      21 Nov 2020 04:06  Phos  3.9     11-21  Mg     2.0     11-21    TPro  4.4<L>  /  Alb  1.9<L>  /  TBili  1.5  /  DBili  x   /  AST  60<H>  /  ALT  161<H>  /  AlkPhos  173<H>  11-21      Culture - Acid Fast - CSF (collected 11-20-20 @ 21:12)  Source: .CSF CSF    Culture - CSF with Gram Stain (collected 11-20-20 @ 21:12)  Source: .CSF CSF  Gram Stain (11-20-20 @ 22:12):    No polymorphonuclear cells seen    No organisms seen    by cytocentrifuge    Culture - Sputum (collected 11-19-20 @ 15:12)  Source: .Sputum Sputum  Gram Stain (11-19-20 @ 19:59):    Moderate polymorphonuclear leukocytes per low power field    Rare Squamous epithelial cells per low power field    No organisms seen per oil power field    GI PCR Panel, Stool (collected 11-18-20 @ 15:37)  Source: .Stool Feces  Final Report (11-18-20 @ 19:19):    GI PCR Results: NOT detected    *******Please Note:*******    GI panel PCR evaluates for:    Campylobacter, Plesiomonas shigelloides, Salmonella,    Vibrio, Yersinia enterocolitica, Enteroaggregative    Escherichia coli (EAEC), Enteropathogenic E.coli (EPEC),    Enterotoxigenic E. coli (ETEC) lt/st, Shiga-like    toxin-producing E. coli (STEC) stx1/stx2,    Shigella/ Enteroinvasive E. coli (EIEC), Cryptosporidium,    Cyclospora cayetanensis, Entamoeba histolytica,    Giardia lamblia, Adenovirus F 40/41, Astrovirus,    Norovirus GI/GII, Rotavirus A, Sapovirus    Culture - Sputum (collected 11-17-20 @ 22:03)  Source: .Sputum Sputum  Gram Stain (11-17-20 @ 23:42):    Few polymorphonuclear leukocytes per low power field    No Squamous epithelial cells per low power field    Few Yeast like cells per oil power field  Final Report (11-19-20 @ 17:18):    Normal Respiratory Priscilla present    Culture - Blood (collected 11-17-20 @ 11:37)  Source: .Blood Blood    Culture - Blood (collected 11-17-20 @ 10:37)  Source: .Blood Blood    Culture - Urine (collected 11-15-20 @ 01:27)  Source: .Urine Clean Catch (Midstream)  Final Report (11-16-20 @ 19:28):    >=3 organisms. Probable collection contamination.    Culture - Blood (collected 11-14-20 @ 21:42)  Source: .Blood Blood-Peripheral  Final Report (11-19-20 @ 23:00):    No growth at 5 days.    Culture - Blood (collected 11-14-20 @ 21:41)  Source: .Blood Blood-Peripheral  Final Report (11-19-20 @ 23:00):    No growth at 5 days.      Creatinine, Serum: 0.71 mg/dL (11-21-20 @ 04:06)  Creatinine, Serum: 0.63 mg/dL (11-20-20 @ 04:32)  Creatinine, Serum: 0.62 mg/dL (11-20-20 @ 00:09)  Creatinine, Serum: 0.93 mg/dL (11-19-20 @ 03:17)  Creatinine, Serum: 1.36 mg/dL (11-18-20 @ 03:11)  Creatinine, Serum: 1.93 mg/dL (11-17-20 @ 04:36)    Procalcitonin, Serum: 5.67 ng/mL (11-21-20 @ 04:06)  Procalcitonin, Serum: 8.23 ng/mL (11-20-20 @ 04:32)  Procalcitonin, Serum: 51.48 ng/mL (11-17-20 @ 04:36)          WBC Count: 28.90 K/uL (11-21-20 @ 04:06)  WBC Count: 41.48 K/uL (11-20-20 @ 04:33)  WBC Count: 34.30 K/uL (11-19-20 @ 17:07)  WBC Count: 29.30 K/uL (11-19-20 @ 03:17)  WBC Count: 20.16 K/uL (11-18-20 @ 03:11)  WBC Count: 16.08 K/uL (11-17-20 @ 04:36)      COVID-19 IgG Antibody Interpretation: Negative (11-15-20 @ 08:21)  COVID-19 IgG Antibody Index: <0.10 Index (11-15-20 @ 08:21)  COVID-19 PCR: NotDetec (11-14-20 @ 22:00)      Alkaline Phosphatase, Serum: 173 U/L (11-21-20 @ 04:06)  Alanine Aminotransferase (ALT/SGPT): 161 U/L (11-21-20 @ 04:06)  Aspartate Aminotransferase (AST/SGOT): 60 U/L (11-21-20 @ 04:06)  Bilirubin Total, Serum: 1.5 mg/dL (11-21-20 @ 04:06)    < from: CT Abdomen and Pelvis No Cont (11.20.20 @ 17:05) >     EXAM:  CT ABDOMEN AND PELVIS                         EXAM:  CT CHEST                          PROCEDURE DATE:  11/20/2020          INTERPRETATION:  CT CHEST, CT ABDOMEN AND PELVIS    CLINICAL INFORMATION: fever, thyrotoxicosis, atrial fibrillation, GI bleed    COMPARISON: CT chest/abdomen/pelvis 10/24/2017    PROCEDURE:  CT of the Chest, Abdomen and Pelvis was performed without intravenous contrast.  Intravenous contrast: None  Oral contrast: None.  Sagittal and coronal reformats were performed.    FINDINGS:    CHEST:    LUNGS, AIRWAYS: The tip of the endotracheal tube is above the kevin. The central airways are patent. Moderate consolidation in the left lower lobe with additional patchy airspace disease throughout the remainder of the lungs.  PLEURA: No pleural abnormality.  VESSELS: Normal caliber aorta.  HEART: Normal heart size. No pericardial effusion. Left dual-lead pacer.  MEDIASTINUM AND TARA: No adenopathy.  CHEST WALL: No masses.    ABDOMEN AND PELVIS:    LIVER: Normal.  BILE DUCTS: Nondilated.  GALLBLADDER: Normal.  SPLEEN: Normal.  PANCREAS: Normal.  ADRENALS: Normal.  KIDNEYS/URETERS: No hydronephrosis or urinary tract calculi.    BLADDER: Small air in the lumen.  REPRODUCTIVE ORGANS: Multifibroid uterus.    BOWEL: Nobowel-related abnormality. Specifically, no bowel obstruction. The NG tube is in the stomach.  PERITONEUM: No free air or ascites. No collection.  VESSELS:  Aortoiliac atherosclerosis without aneurysm.  RETROPERITONEUM/LYMPH NODES: No adenopathy.  ABDOMINAL WALL: Normal.  BONES: No aggressive lesion.    IMPRESSION:    Multifocal pneumonia.    No acute abdominal pathology. No collection.            ORIN GALLAGHER MD; Attending Radiologist  This document has been electronically signed. Nov 20 2020  5:59PM    < end of copied text >

## 2020-11-21 NOTE — CHART NOTE - NSCHARTNOTEFT_GEN_A_CORE
No mental status with minimal grimace on deep painful stimuli  Failed SAT and SBT (apneic in  sec on CPAP   D/c'd Acyclovir while c/w vanco and Meropenem for now for CAP  Treating Hypernatremia with 1/2 NS  Added Pre meal and also basal insulin   PTU, Steroids, BB  No AC due to LGIB and anemia; needs IVC for DVT+; vascular signed off, need IR     Needs MICU mx for now
PRELIMINARY EEG REVIEW    EEG reviewed to 19:36  Date: 11-10-20    - Diffuse polymorphic delta slowing  - No seizure seen.    This Preliminary report is based on fellow review. Final report pending Completion of study tomorrow morning and following attending review.    Reading Room: [phone]  On Call Service After Hours: [phone]    Oziel Marmolejo MD PGY-5  Epilepsy Fellow
Pt has been optimized from a medical standpoint to go for IVC filter procedure tomorrow. No absolute contraindications at this time.    Nestor Gaines M.D.  Pulmonary & Critical Care Medicine  [facility]  Pulmonary Medicine at [place]  [address]  [place], [place] [address]  T: [phone]  F: [phone]
Spoke with interventional radiology attending Dr. Gardner about placement of IVC filter. Attending has concerns about rising WBC and fevers as possible sources of infection for IVC filter, recommends deferring procedure until improvement.
patient of [facility] cardiology. ICU aware and will contact.
Source: Patient [ ]  Family [ ]   other [x ] EMR and staff     Enteral /Parenteral Nutrition: Diet, NPO with Tube Feed:   Tube Feeding Modality: Nasogastric  Glucerna 1.5 Rick (GLUCERNA1.5)  Continuous  Starting Tube Feed Rate {mL per Hour}: 20  Increase Tube Feed Rate by (mL): 10     Every 4 hours  Until Goal Tube Feed Rate (mL per Hour): 40  Tube Feed Duration (in Hours): 24  Tube Feed Start Time: 19:00 (11-15-20 @ 18:50)      Current Weight:   11/17: 71.5kg  11/15: 65.8kg     % Weight Change: 13# wt change over 2 days, unsure of accuracy of wt's secondary to inconsistency, will continue to monitor wt's for trends. (2+ Generalized edema noted, 3+ edema in B/L arms)     Pertinent Medications: MEDICATIONS  (STANDING):  acyclovir IVPB 650 milliGRAM(s) IV Intermittent every 8 hours  chlorhexidine 0.12% Liquid 15 milliLiter(s) Oral Mucosa every 12 hours  dextrose 40% Gel 15 Gram(s) Oral once  dextrose 40% Gel 15 Gram(s) Oral once  dextrose 50% Injectable 50 milliLiter(s) IV Push every 15 minutes  digoxin     Tablet 0.125 milliGRAM(s) Oral daily  doxazosin 1 milliGRAM(s) Oral at bedtime  fentaNYL    Injectable 50 MICROGram(s) IV Push once  glucagon  Injectable 1 milliGRAM(s) IntraMuscular once  hydrocortisone sodium succinate Injectable 50 milliGRAM(s) IV Push daily  insulin glargine Injectable (LANTUS) 15 Unit(s) SubCutaneous at bedtime  insulin lispro (ADMELOG) corrective regimen sliding scale   SubCutaneous every 6 hours  levETIRAcetam  IVPB 500 milliGRAM(s) IV Intermittent every 12 hours  meropenem  IVPB 2000 milliGRAM(s) IV Intermittent every 8 hours  metoprolol tartrate 50 milliGRAM(s) Enteral Tube every 6 hours  pantoprazole  Injectable 40 milliGRAM(s) IV Push daily  potassium chloride  10 mEq/100 mL IVPB 10 milliEquivalent(s) IV Intermittent every 1 hour  potassium iodide 10%/iodine 5% Oral Liquid - Peds 0.25 milliLiter(s) Oral every 6 hours  propylthiouracil 200 milliGRAM(s) Oral every 8 hours  sodium chloride 0.45%. 1000 milliLiter(s) (100 mL/Hr) IV Continuous <Continuous>  vancomycin  IVPB 750 milliGRAM(s) IV Intermittent every 12 hours    MEDICATIONS  (PRN):  acetaminophen    Suspension .. 650 milliGRAM(s) Oral every 6 hours PRN Temp greater or equal to 38C (100.4F)  albuterol/ipratropium for Nebulization. 3 milliLiter(s) Nebulizer every 6 hours PRN Bronchospasm  sodium chloride 0.9% lock flush 10 milliLiter(s) IV Push every 1 hour PRN Pre/post blood products, medications, blood draw, and to maintain line patency    Pertinent Labs: CBC Full  -  ( 21 Nov 2020 04:06 )  WBC Count : 28.90 K/uL  RBC Count : 2.66 M/uL  Hemoglobin : 7.3 g/dL  Hematocrit : 23.8 %  Platelet Count - Automated : 128 K/uL  Mean Cell Volume : 89.5 fl  Mean Cell Hemoglobin : 27.4 pg  Mean Cell Hemoglobin Concentration : 30.7 gm/dL  Auto Neutrophil # : 23.58 K/uL  Auto Lymphocyte # : 2.77 K/uL  Auto Monocyte # : 2.02 K/uL  Auto Eosinophil # : 0.00 K/uL  Auto Basophil # : 0.00 K/uL  Auto Neutrophil % : 81.6 %  Auto Lymphocyte % : 9.6 %  Auto Monocyte % : 7.0 %  Auto Eosinophil % : 0.0 %  Auto Basophil % : 0.0 %        Skin: IAD to perineum     Nutrition focused physical exam conducted - found signs of malnutrition [ ]absent [ x]present    Subcutaneous fat loss: Severe [x ] Orbital fat pads region, x ]Buccal fat region, [ ]Triceps region,  [x ]thoracic   Muscle wasting: Severe [x ]Temples region, [x ]Clavicle region, [x ]Shoulder region, [ ]Scapula region, [ ]Interosseous region,  [ ]thigh region, [ ]Calf region    Estimated Needs:   [ x] no change since previous assessment  [ ] recalculated:     Current Nutrition Diagnosis:  Pt remains at high nutrition risk secondary to Severe (Acute on chronic) protein calorie malnutrition related to inability to meet sufficient protein energy requirements in setting of advanced age with confusion, now with acute hypoxemic resp failure, lobar PNA requiring vent support as evidenced by physical signs of severe muscle mass and severe fat loss and +fluid accumulation. Pt receiving enteral feeds of Glucerna 1.5 @ 40ml/hr (x20 hours) 800ml/day; 1200ml/day; 67gm protein. Aware pt with hyperosmolar hypernatremia; receiving IVF + and additional 300ml free water every 4 hours. Not yet meeting estimated nutrition needs. Recommendations below:     Recommendations:   1. RX: MVI daily   2. Check wt daily to monitor trends  3. Consider increasing Glucerna to 50ml/hr ([id number]) 1000ml/day; 1500kcal, 84gm protein to better meet pt's estimated nutrition needs.   4. Continue with free water 300ml every 4 hours   5. Tight glycemic control   6. Monitor electrolytes closely (Na+, K+, Renal labs)     Monitoring and Evaluation:   [ ] PO intake [x ] Tolerance to diet prescription [X] Weights  [X] Follow up per protocol [X] Labs:

## 2020-11-22 NOTE — PROVIDER CONTACT NOTE (EICU) - ASSESSMENT
Problem List  1. Thyroid storm with altered mental status, tachycardia, hypercalcemia and hyperglycermia (pretty classic)  2. r/o acute coronary syndrome
GCS 3 off sedation. + Gag and + Pupilary reflexes
as per Dotty, not a candidate

## 2020-11-22 NOTE — PROGRESS NOTE ADULT - ASSESSMENT
Today's lab results reviewed. Elevated free T4, free T3 WNL. TSH will remain suppressed for weeks, so is no longer an indicator of thyroid status.  Due to T4 elevation will increase PTU to 200 mg q6h.  Glycemic control will likely improve with reduction in hydrocortisone.

## 2020-11-22 NOTE — PROGRESS NOTE ADULT - ASSESSMENT
This 80 year old female with a pmhx of PPM, DM, HTN, hyperthyroid who presents with AMS. Per daughter she brought patient in as she has been having frequent falls at home. Over the past few days she noticed patient becoming more confused. Patient had fall (11/13/2020) and was unable to get up and was then brought in to Ed. Upon arrival noted to be SVT, HTN. TSH undetectable and had high t3/4. MICU then consulted.  (14 Nov 2020 23:26)    he was found with acute respiratory failure with hypoxia. Per team, aspiration event noted as well.   Images showed  L perihilar area with patchy infiltrates > R perihilar.  empiric antibiotics were started.     patient remains intubated under MICU care.  Thyroid storm also being managed by the ICU    Impression:  - acute respiratory failure  - multifocal PNA  - thyroid storm  - WBC elevation  - acute renal failure  - transaminitis  - shock      Plan:  - WBC down trending today  - Procalcitonin downtrending, with normal renal function    - s/p LP  - no clear evidence of meningitis. stopped acyclovir 11/21    CT chest with pulm infiltrates, multifocal PNA  - continue antibiotics for this  - sputum cx with yeast like cells, will follow       - follow up all outstanding cultures  - trend temperature and WBC curve  - repeat cultures from blood and all sources if febrile.        Liver enzymes elevated  - will trend    Acute renal failure  - will watch closely         Consider goals of care

## 2020-11-22 NOTE — PROGRESS NOTE ADULT - SUBJECTIVE AND OBJECTIVE BOX
[facility]  INFECTIOUS DISEASES AND INTERNAL MEDICINE at [place]  =======================================================  Willam Luu MD  Diplomates American Board of Internal Medicine and Infectious Diseases  Tel  [phone]  Fax [phone]  =======================================================    [id number]  SAULO LEROY  follow up:  multifocal PNA    remains in MICU  cultures from CSF  negative so far    =======================================================    REVIEW OF SYSTEMS:  Limited due to medical condition    =======================================================  Allergies  No Known Allergies     ======================================================  Physical Exam:  ============    General:  THIN FRAIL, sedated  Eye: Pupils are equal, round and reactive to light,  BILATERAL pterygium,  right WORSE THAN LEFT;  bilateral cataracts  HENT: Normocephalic, Oral mucosa is dry  ET Tube in place  Neck: Supple, No lymphadenopathy.  Respiratory: Lungs with fair air entry anteriorly  Cardiovascular: Normal rate, Regular rhythm,   Gastrointestinal: Soft,  Non-distended, Normal bowel sounds.  Genitourinary: PANG with clear urine  Lymphatics: No lymphadenopathy neck,   Musculoskeletal: no joint abnl  Integumentary: No rash.  Neurologic: sedated, no withdrawal to pain, UNCHANGED    ======================================================  Vitals:  ============  T(F): 98.6 (22 Nov 2020 08:00), Max: 99.1 (22 Nov 2020 00:07)  HR: 65 (22 Nov 2020 12:00)  BP: 164/60 (22 Nov 2020 12:00)  RR: 20 (22 Nov 2020 12:00)  SpO2: 100% (22 Nov 2020 12:00) (98% - 100%)  temp max in last 48H T(F): , Max: 99.3 (11-21-20 @ 00:00)    =======================================================  Current Antibiotics:  meropenem  IVPB 2000 milliGRAM(s) IV Intermittent every 8 hours    Other medications:  chlorhexidine 0.12% Liquid 15 milliLiter(s) Oral Mucosa every 12 hours  dextrose 40% Gel 15 Gram(s) Oral once  dextrose 40% Gel 15 Gram(s) Oral once  dextrose 5%. 1000 milliLiter(s) IV Continuous <Continuous>  dextrose 5%. 1000 milliLiter(s) IV Continuous <Continuous>  dextrose 50% Injectable 50 milliLiter(s) IV Push every 15 minutes  digoxin     Tablet 0.125 milliGRAM(s) Oral daily  doxazosin 1 milliGRAM(s) Oral at bedtime  glucagon  Injectable 1 milliGRAM(s) IntraMuscular once  hydrocortisone sodium succinate Injectable 50 milliGRAM(s) IV Push daily  insulin glargine Injectable (LANTUS) 15 Unit(s) SubCutaneous two times a day  insulin lispro (ADMELOG) corrective regimen sliding scale   SubCutaneous every 6 hours  insulin lispro Injectable (ADMELOG) 2 Unit(s) SubCutaneous every 6 hours  lactobacillus acidophilus 1 Tablet(s) Oral daily  levETIRAcetam  IVPB 500 milliGRAM(s) IV Intermittent every 12 hours  metoprolol tartrate 50 milliGRAM(s) Enteral Tube every 6 hours  pantoprazole  Injectable 40 milliGRAM(s) IV Push daily  potassium iodide 10%/iodine 5% Oral Liquid - Peds 0.25 milliLiter(s) Oral every 6 hours  propylthiouracil 200 milliGRAM(s) Oral every 8 hours  psyllium Powder 1 Packet(s) Oral daily      =======================================================  Labs:                        7.3    28.20 )-----------( 123      ( 22 Nov 2020 04:18 )             23.3      11-22    143  |  109<H>  |  67.0<H>  ----------------------------<  152<H>  4.0   |  28.0  |  0.49<L>    Ca    8.3<L>      22 Nov 2020 04:18  Phos  3.5     11-22  Mg     2.0     11-22    TPro  4.7<L>  /  Alb  2.0<L>  /  TBili  1.2  /  DBili  x   /  AST  84<H>  /  ALT  139<H>  /  AlkPhos  226<H>  11-22      Culture - Sputum (collected 11-21-20 @ 09:05)  Source: .Sputum Sputum  Gram Stain (11-21-20 @ 14:05):    Numerous polymorphonuclear leukocytes per low power field    No Squamous epithelial cells per low power field    Moderate Yeast like cells per oil power field    Culture - Acid Fast - CSF (collected 11-20-20 @ 21:12)  Source: .CSF CSF    Culture - CSF with Gram Stain (collected 11-20-20 @ 21:12)  Source: .CSF CSF  Gram Stain (11-20-20 @ 22:12):    No polymorphonuclear cells seen    No organisms seen    by cytocentrifuge    Culture - Sputum (collected 11-19-20 @ 15:12)  Source: .Sputum Sputum  Gram Stain (11-19-20 @ 19:59):    Moderate polymorphonuclear leukocytes per low power field    Rare Squamous epithelial cells per low power field    No organisms seen per oil power field  Final Report (11-21-20 @ 20:38):    Normal Respiratory Priscilla present    GI PCR Panel, Stool (collected 11-18-20 @ 15:37)  Source: .Stool Feces  Final Report (11-18-20 @ 19:19):    GI PCR Results: NOT detected    *******Please Note:*******    GI panel PCR evaluates for:    Campylobacter, Plesiomonas shigelloides, Salmonella,    Vibrio, Yersinia enterocolitica, Enteroaggregative    Escherichia coli (EAEC), Enteropathogenic E.coli (EPEC),    Enterotoxigenic E. coli (ETEC) lt/st, Shiga-like    toxin-producing E. coli (STEC) stx1/stx2,    Shigella/ Enteroinvasive E. coli (EIEC), Cryptosporidium,    Cyclospora cayetanensis, Entamoeba histolytica,    Giardia lamblia, Adenovirus F 40/41, Astrovirus,    Norovirus GI/GII, Rotavirus A, Sapovirus    Culture - Sputum (collected 11-17-20 @ 22:03)  Source: .Sputum Sputum  Gram Stain (11-17-20 @ 23:42):    Few polymorphonuclear leukocytes per low power field    No Squamous epithelial cells per low power field    Few Yeast like cells per oil power field  Final Report (11-19-20 @ 17:18):    Normal Respiratory Priscilla present    Culture - Blood (collected 11-17-20 @ 11:37)  Source: .Blood Blood  Final Report (11-22-20 @ 13:01):    No growth at 5 days.    Culture - Blood (collected 11-17-20 @ 10:37)  Source: .Blood Blood  Final Report (11-22-20 @ 11:01):    No growth at 5 days.    Culture - Urine (collected 11-15-20 @ 01:27)  Source: .Urine Clean Catch (Midstream)  Final Report (11-16-20 @ 19:28):    >=3 organisms. Probable collection contamination.    Culture - Blood (collected 11-14-20 @ 21:42)  Source: .Blood Blood-Peripheral  Final Report (11-19-20 @ 23:00):    No growth at 5 days.    Culture - Blood (collected 11-14-20 @ 21:41)  Source: .Blood Blood-Peripheral  Final Report (11-19-20 @ 23:00):    No growth at 5 days.      Creatinine, Serum: 0.49 mg/dL (11-22-20 @ 04:18)  Creatinine, Serum: 0.63 mg/dL (11-21-20 @ 19:07)  Creatinine, Serum: 0.71 mg/dL (11-21-20 @ 04:06)  Creatinine, Serum: 0.63 mg/dL (11-20-20 @ 04:32)  Creatinine, Serum: 0.62 mg/dL (11-20-20 @ 00:09)  Creatinine, Serum: 0.93 mg/dL (11-19-20 @ 03:17)  Creatinine, Serum: 1.36 mg/dL (11-18-20 @ 03:11)    Procalcitonin, Serum: 2.40 ng/mL (11-22-20 @ 09:31)  Procalcitonin, Serum: 5.67 ng/mL (11-21-20 @ 04:06)  Procalcitonin, Serum: 8.23 ng/mL (11-20-20 @ 04:32)  Procalcitonin, Serum: 51.48 ng/mL (11-17-20 @ 04:36)       WBC Count: 28.20 K/uL (11-22-20 @ 04:18)  WBC Count: 28.90 K/uL (11-21-20 @ 04:06)  WBC Count: 41.48 K/uL (11-20-20 @ 04:33)  WBC Count: 34.30 K/uL (11-19-20 @ 17:07)  WBC Count: 29.30 K/uL (11-19-20 @ 03:17)  WBC Count: 20.16 K/uL (11-18-20 @ 03:11)      COVID-19 IgG Antibody Interpretation: Negative (11-15-20 @ 08:21)  COVID-19 IgG Antibody Index: <0.10 Index (11-15-20 @ 08:21)  COVID-19 PCR: NotDetec (11-14-20 @ 22:00)      Alkaline Phosphatase, Serum: 226 U/L (11-22-20 @ 04:18)  Alkaline Phosphatase, Serum: 173 U/L (11-21-20 @ 04:06)  Alanine Aminotransferase (ALT/SGPT): 139 U/L (11-22-20 @ 04:18)  Alanine Aminotransferase (ALT/SGPT): 161 U/L (11-21-20 @ 04:06)  Aspartate Aminotransferase (AST/SGOT): 84 U/L (11-22-20 @ 04:18)  Aspartate Aminotransferase (AST/SGOT): 60 U/L (11-21-20 @ 04:06)  Bilirubin Total, Serum: 1.2 mg/dL (11-22-20 @ 04:18)  Bilirubin Total, Serum: 1.5 mg/dL (11-21-20 @ 04:06)      < from: CT Abdomen and Pelvis No Cont (11.20.20 @ 17:05) >     EXAM:  CT ABDOMEN AND PELVIS                         EXAM:  CT CHEST                          PROCEDURE DATE:  11/20/2020          INTERPRETATION:  CT CHEST, CT ABDOMEN AND PELVIS    CLINICAL INFORMATION: fever, thyrotoxicosis, atrial fibrillation, GI bleed    COMPARISON: CT chest/abdomen/pelvis 10/24/2017    PROCEDURE:  CT of the Chest, Abdomen and Pelvis was performed without intravenous contrast.  Intravenous contrast: None  Oral contrast: None.  Sagittal and coronal reformats were performed.    FINDINGS:    CHEST:    LUNGS, AIRWAYS: The tip of the endotracheal tube is above the kevin. The central airways are patent. Moderate consolidation in the left lower lobe with additional patchy airspace disease throughout the remainder of the lungs.  PLEURA: No pleural abnormality.  VESSELS: Normal caliber aorta.  HEART: Normal heart size. No pericardial effusion. Left dual-lead pacer.  MEDIASTINUM AND TARA: No adenopathy.  CHEST WALL: No masses.    ABDOMEN AND PELVIS:    LIVER: Normal.  BILE DUCTS: Nondilated.  GALLBLADDER: Normal.  SPLEEN: Normal.  PANCREAS: Normal.  ADRENALS: Normal.  KIDNEYS/URETERS: No hydronephrosis or urinary tract calculi.    BLADDER: Small air in the lumen.  REPRODUCTIVE ORGANS: Multifibroid uterus.    BOWEL: Nobowel-related abnormality. Specifically, no bowel obstruction. The NG tube is in the stomach.  PERITONEUM: No free air or ascites. No collection.  VESSELS:  Aortoiliac atherosclerosis without aneurysm.  RETROPERITONEUM/LYMPH NODES: No adenopathy.  ABDOMINAL WALL: Normal.  BONES: No aggressive lesion.    IMPRESSION:    Multifocal pneumonia.    No acute abdominal pathology. No collection.            ORIN GALLAGHER MD; Attending Radiologist  This document has been electronically signed. Nov 20 2020  5:59PM    < end of copied text >

## 2020-11-22 NOTE — PROVIDER CONTACT NOTE (EICU) - BACKGROUND
80F with a history of DM, HTN, hyperthryoid on methimazole 10mg Q8, pacemaker and dementia came in for altered mental status and found down on the floor by pt's daughter and been falling the past 2 weeks.    In ED, found to have HR of 150s, likely sinus tach as per ICU PA.  Temp 99   INR 1.4, Ca 11.8, Glu 208, TSH < 0.1, Free T4 21.2, T3 475, , Pro BNP 35734, Trop 0.16, Lactate 3.6
pt admitted with thyroid storm now with MODS
TOD 1919

## 2020-11-22 NOTE — PROGRESS NOTE ADULT - SUBJECTIVE AND OBJECTIVE BOX
INTERVAL HPI/OVERNIGHT EVENTS: follow up thyroid storm    MEDICATIONS  (STANDING):  chlorhexidine 0.12% Liquid 15 milliLiter(s) Oral Mucosa every 12 hours  dextrose 40% Gel 15 Gram(s) Oral once  dextrose 40% Gel 15 Gram(s) Oral once  dextrose 5%. 1000 milliLiter(s) (50 mL/Hr) IV Continuous <Continuous>  dextrose 5%. 1000 milliLiter(s) (100 mL/Hr) IV Continuous <Continuous>  dextrose 50% Injectable 50 milliLiter(s) IV Push every 15 minutes  digoxin     Tablet 0.125 milliGRAM(s) Oral daily  doxazosin 1 milliGRAM(s) Oral at bedtime  glucagon  Injectable 1 milliGRAM(s) IntraMuscular once  hydrocortisone sodium succinate Injectable 50 milliGRAM(s) IV Push daily  insulin glargine Injectable (LANTUS) 15 Unit(s) SubCutaneous two times a day  insulin lispro (ADMELOG) corrective regimen sliding scale   SubCutaneous every 6 hours  insulin lispro Injectable (ADMELOG) 2 Unit(s) SubCutaneous every 6 hours  lactobacillus acidophilus 1 Tablet(s) Oral daily  levETIRAcetam  IVPB 500 milliGRAM(s) IV Intermittent every 12 hours  meropenem  IVPB 2000 milliGRAM(s) IV Intermittent every 8 hours  metoprolol tartrate 50 milliGRAM(s) Enteral Tube every 6 hours  pantoprazole  Injectable 40 milliGRAM(s) IV Push daily  potassium iodide 10%/iodine 5% Oral Liquid - Peds 0.25 milliLiter(s) Oral every 6 hours  propylthiouracil 200 milliGRAM(s) Oral every 8 hours  psyllium Powder 1 Packet(s) Oral daily    MEDICATIONS  (PRN):  acetaminophen    Suspension .. 650 milliGRAM(s) Oral every 6 hours PRN Temp greater or equal to 38C (100.4F)  albuterol/ipratropium for Nebulization. 3 milliLiter(s) Nebulizer every 6 hours PRN Bronchospasm  sodium chloride 0.9% lock flush 10 milliLiter(s) IV Push every 1 hour PRN Pre/post blood products, medications, blood draw, and to maintain line patency      Allergies    No Known Allergies    Intolerances      Vital Signs Last 24 Hrs  T(C): 37 (22 Nov 2020 08:00), Max: 37.3 (22 Nov 2020 00:07)  T(F): 98.6 (22 Nov 2020 08:00), Max: 99.1 (22 Nov 2020 00:07)  HR: 68 (22 Nov 2020 14:00) (60 - 80)  BP: 168/60 (22 Nov 2020 14:00) (136/52 - 177/65)  BP(mean): 90 (22 Nov 2020 14:00) (75 - 97)  RR: 20 (22 Nov 2020 14:00) (20 - 23)  SpO2: 99% (22 Nov 2020 14:00) (98% - 100%)        LABS:                        7.3    28.20 )-----------( 123      ( 22 Nov 2020 04:18 )             23.3     11-22    143  |  109<H>  |  67.0<H>  ----------------------------<  152<H>  4.0   |  28.0  |  0.49<L>    Ca    8.3<L>      22 Nov 2020 04:18  Phos  3.5     11-22  Mg     2.0     11-22    TPro  4.7<L>  /  Alb  2.0<L>  /  TBili  1.2  /  DBili  x   /  AST  84<H>  /  ALT  139<H>  /  AlkPhos  226<H>  11-22          POCT Blood Glucose.: 193 mg/dL (11-22-20 @ 12:18)  POCT Blood Glucose.: 182 mg/dL (11-21-20 @ 23:08)  POCT Blood Glucose.: 265 mg/dL (11-21-20 @ 18:38)  POCT Blood Glucose.: 255 mg/dL (11-21-20 @ 11:34)  POCT Blood Glucose.: 257 mg/dL (11-21-20 @ 06:57)  POCT Blood Glucose.: 341 mg/dL (11-21-20 @ 00:00)  POCT Blood Glucose.: 264 mg/dL (11-20-20 @ 18:30)  POCT Blood Glucose.: 196 mg/dL (11-20-20 @ 14:34)  POCT Blood Glucose.: 148 mg/dL (11-20-20 @ 06:16)  POCT Blood Glucose.: 172 mg/dL (11-19-20 @ 23:57)  POCT Blood Glucose.: 127 mg/dL (11-19-20 @ 22:18)  POCT Blood Glucose.: 99 mg/dL (11-19-20 @ 21:18)  POCT Blood Glucose.: 107 mg/dL (11-19-20 @ 20:04)  POCT Blood Glucose.: 118 mg/dL (11-19-20 @ 18:55)  POCT Blood Glucose.: 124 mg/dL (11-19-20 @ 17:38)  POCT Blood Glucose.: 148 mg/dL (11-19-20 @ 16:12)

## 2020-11-22 NOTE — DISCHARGE NOTE FOR THE EXPIRED PATIENT - HOSPITAL COURSE
80F with PMH of DM, HTN, PPM, hyperthyroidism who presented with AMS/frequent falls. Found to have SVT. TSH undetectable. Started on PTU, steroids. Worsening AMS while on floor intubated for hypoxic respiratory failure due to pulm edema. Transferred to ICU. Course further complicated by Afib with RVR, ongoing AMS, VASHTI, anemia. Pt's hospital course complicated by failure to wean and liberate from the vent 2/2 to AMS - not waking up. Extensive work-up for AMS performed CT head X2 (-), EEG 24 hours (-) for seizure activity, LP performed (-) for meningitis encephalitis/meningitis, MR Head pending PPM compatibility, Bcx performed and NTD - although pt with multiple courses of BS ABX. Afib  controlled with Lopressor, Pt with anemia and fall risk and with DVT was c/s for IVC filter with vascular and IR team which was to be performed when WBC/fever came down and pt was cleared by ID.     Pt's condition was discussed with family at the bedside and over the phone, family had decided to trach/PEG today in the setting that she still was not able to be weaned 2/2 to poor mental status - not waking up     This evening pt went into VTach/torsades with pulse and was given 15  Amio IVP and 2 g Mg, pt then went into Vfib and lost pulse. Pt with MOLST in the chart advanced directives indicating  DNR. Family was contacted and notified that pt had lost pulse. Daughter Barbi called.     pt went into asystole on monitor, no palpable pulses at carotid and femoral locations, the ventilator was disconnected, no spontaneous breath sounds were auscultated via stethoscope. There were no heart sounds auscultated via stethoscope at left and right sternal boarders as well at PMI, skin was cyanotic and cool and pupils were fixed and dilated without reaction to light.   Pt was pronounced dead 1919

## 2020-11-22 NOTE — PROVIDER CONTACT NOTE (EICU) - SITUATION
[facility] Referal
Pt noted with inc WOB and hypoxia per TEOFILO Davis. POCU/S noted with extensive b-lines, and pt with ?aspiration.
Pt active [facility] referral case  # [id number]

## 2020-11-22 NOTE — EEG REPORT - NS EEG TEXT BOX
[facility]  Comprehensive Epilepsy Center  Report of Continuous Video EEG    [facility]: [address], [place] [address], Phone [phone]  [facility]: [address] [place], [place] [address] [address], Phone [phone]  [place] Office: [address], [address], [place] [address] Phone [phone]    [facility]: [address] [place] [address], Phone [phone]  [facility]: [address] [place] [address], Phone [phone]    Patient Name: Savana Bailey    Age: 80 year, : 1940  Patient ID: -, MRN #: -, Swartz: - [id number]  Referring Physician: -  EEG #: [id number]    Study Time/Date: 2:11:22 PM on 2020  	  End Time/Date: 19:27 on 2020			   Duration: 314.3m    Study Information:    EEG Recording Technique:  The patient underwent continuous Video-EEG monitoring, using Telemetry System hardware on the XLTek Digital System. EEG and video data were stored on a computer hard drive with important events saved in digital archive files. The material was reviewed by a physician (electroencephalographer / epileptologist) on a daily basis. Rafy and seizure detection algorithms were utilized and reviewed. An EEG Technician attended to the patient, and was available throughout daytime work hours.  The epilepsy center neurologist was available in person or on call 24-hours per day.    EEG Placement and Labeling of Electrodes:  The EEG was performed utilizing 20 channel referential EEG connections (coronal over temporal over parasagittal montage) using all standard 10-20 electrode placements with EKG, with additional electrodes placed in the inferior temporal region using the modified 10-10 montage electrode placements for elective admissions, or if deemed necessary. Recording was at a sampling rate of 256 samples per second per channel. Time synchronized digital video recording was done simultaneously with EEG recording. A low light infrared camera was used for low light recording.     History:  Not given    Pertinent Medication  lanoxin  cadura  duoneb  Keppra  lopressor  Insulin    Interpretation:    Daily EEG Visual Analysis  Findings: The background was continuous, spontaneously variable and reactive.   No posterior dominant rhythm seen.  Background predominantly consisted of theta, delta and faster activities.    Focal Slowing:   None were present.    Sleep Background:  Drowsiness and stage II sleep transients were not recorded.    Other Non-Epileptiform Findings:  None were present.    Interictal Epileptiform Activity:   None were present.    Events:  Clinical events: None recorded.  Seizures: None recorded.    Activation Procedures:   Hyperventilation was not performed.    Photic stimulation was not performed.     Artifacts:  Intermittent myogenic and movement artifacts were noted.    ECG:  The heart rate on single channel ECG was irregular.    Note: Patient with VFib at 19:00:20 with diffuse attenuation of cerebral activity at 19:00:34.  Patient  at 19:19 according to chart note.    EEG Summary / Classification:  Abnormal EEG in an altered patient.  - Moderate generalized slowing    EEG Impression / Clinical Correlate:  Abnormal EEG study.  Moderate nonspecific diffuse or multifocal cerebral dysfunction.   Abnormal heart rhythm noted – patient  19:19    Riky Murrieta MD  Attending Physician, [facility] [place]

## 2020-11-22 NOTE — PROGRESS NOTE ADULT - ASSESSMENT
1: Acute thyrotoxicosis/ thyroid storm  2: Acute metabolic encephalopathy   3: HCAP   4: Acute hypoxic and hypercapnic resp failure   5: Distributive shock: Improved   6: Hypernatremia   7: Acute DVT left popliteal vn  8: Anemia with FOBT+  9: PAF with PPM ([facility] Assurity [id number])    Patient seen and examined   DNR  POC d/w Barbi (Daughter); started bringing up the idea about possibility of trach and Peg requirement and patient's wishes, GOC etc and she would be talking to her siblings about it     Neuro/ endo:   Pain Mx: none for now  Sedation/Anxiolytic: none   Keppra continued  Would get another CTH w/o contrast (last done on 11/16); appreciate help from cardio-checking in AM if PPM MRI compatible/conditional  12 more hour of eeg to r/u NCSE  Normal ammonia; repeating Tsh and free t3 t4; c/w PTU, Solu-cortef and Lugol's solution   Daily SAT  Lantus BID+ q6 scheduled and PRN short acting insulin     Cardiovascular/Hematology  MAP Target: 65  PRN antihypertensive for now   dig +BB continued for HR controll, paced rhythm; no AC due to anemia +FOBT+  Awaiting IR to get IVC filter placed (needed ID clearance- would repeat PCT, Blood cx for the same while monitoring core temperature )       Resp/Chest:   On Volume AC ; PLS; Vent bundle  Failed SAT and SBT again     Gi/Nutrition:   Diet: Tube feeds  IV PPI  Bowel Regimen as needed  Metamucil and Lactobacillus for stool bulking     ID:   Microbiological studies: repeating blood cx, previous NTD; sputum cx-> multiple growing resp ger;    Abx: Vanco and Acyclovir D/c'd; C/w Meropenem for now; if repeat blood and sputum neg-. contemplate de-escalating further   [place] removed TLC today     Nephro/Electrolyte/Acid-Base:   Removed indwelling catheter yesterday   Avoid nephrotoxic agents  Strict I&O with Cr monitoring   Medications adjusted for renal function  Close Electrolyte monitoring and correction as needed

## 2020-11-22 NOTE — PROGRESS NOTE ADULT - NUTRITIONAL ASSESSMENT
This patient has been assessed with a concern for Malnutrition and has been determined to have a diagnosis/diagnoses of Severe protein-calorie malnutrition.    This patient is being managed with:   Diet NPO with Tube Feed-  Tube Feeding Modality: Nasogastric  Glucerna 1.5 Rick (GLUCERNA1.5)  Continuous  Starting Tube Feed Rate {mL per Hour}: 20  Increase Tube Feed Rate by (mL): 10     Every 4 hours  Until Goal Tube Feed Rate (mL per Hour): 40  Tube Feed Duration (in Hours): 24  Tube Feed Start Time: 19:00  Entered: Nov 15 2020  6:49PM    

## 2020-11-22 NOTE — PROGRESS NOTE ADULT - ASSESSMENT
80y Female with history of DM HTN HLD none obstructive CAD prior cath 2016 but Nuclear stress test 2019 normal perfusion SSS s/p PPM who presents to ED AMS and frequent falls at home.  The patient was found have thyroid storm and was admitted to MICU for acute respiratory failure likely due to ASP PNA.  Severe sepsis VASHTI and shock liver.  The patient was found to have new onset AF RVR  TTE hyperdynamic LV EF > 75%.  Not a good candidate for long term AC due to bleeding and fall risk.  Patient is DNR; HR 's with stable SBP; + DVT anemia Hg 6.5 ; VASHTI improving        PAF remains in atrial paced rhythm. Not on anticoagulation due to anemia  PPM is [facility] Assurity [id number]. Will check in AM if MRI compatible/conditional. Pt potentially needs MRI to further delineate mental status  On PTU, hydrocortisone for hyperthyroidism  Care as per MICU team

## 2020-11-22 NOTE — PROVIDER CONTACT NOTE (EICU) - ACTION/TREATMENT ORDERED:
Spontaneous breathing trial screening order placed. Pt to be screened as per ICU protocol.
Discussed with bedside PA pt need for orders, including Lasix stat, cxr, garcia niv to high flow nc and consider placing NGT to ensure pt receives her tapazole and propanolol and other PO meds if noted with dysphagia and concern for possible aspiration. ABG also placed to assess respiratory status.
Admit to ICU  Case d/w ICU PA
OSBALDO Referal # [id number]
details given to [facility] rep Doherty, no further instruction at this time

## 2020-11-22 NOTE — PROGRESS NOTE ADULT - SUBJECTIVE AND OBJECTIVE BOX
[facility]                              [address] [place] [address]                                                 PHONE: [phone]                                                 FAX: [phone]  -----------------------------------------------------------------------------------------------  Pt seen and examined. FU for AF    Overnight events/Complaints: Pt remains intubated. remains in atrial paced rhythm    Vital Signs Last 24 Hrs  T(C): 37 (22 Nov 2020 08:00), Max: 37.3 (22 Nov 2020 00:07)  T(F): 98.6 (22 Nov 2020 08:00), Max: 99.1 (22 Nov 2020 00:07)  HR: 71 (22 Nov 2020 08:00) (61 - 80)  BP: 151/56 (22 Nov 2020 08:00) (122/50 - 165/62)  BP(mean): 83 (22 Nov 2020 08:00) (70 - 90)  RR: 20 (22 Nov 2020 08:00) (20 - 22)  SpO2: 100% (22 Nov 2020 08:00) (98% - 100%)  I&O's Summary    21 Nov 2020 07:01  -  22 Nov 2020 07:00  --------------------------------------------------------  IN: 5670 mL / OUT: 1400 mL / NET: 4270 mL    22 Nov 2020 07:01  -  22 Nov 2020 09:03  --------------------------------------------------------  IN: 1040 mL / OUT: 350 mL / NET: 690 mL    PHYSICAL EXAM:  Constitutional: Thin female in bed intubated  HEENT:     Head: Normocephalic and atraumatic  Neck: supple. No JVD  Cardiovascular: S1, S2 bradycardia  Respiratory: Lungs clear to auscultation; no crepitations, no wheeze  Gastrointestinal:  Soft, Non-tender, + BS	  Musculoskeletal: Normal range of motion. No edema  Skin: Warm and dry. No cyanosis . No diaphoresis.  Neurologic: Unable to assess        LABS:                        7.3    28.20 )-----------( 123      ( 22 Nov 2020 04:18 )             23.3     11-22    143  |  109<H>  |  67.0<H>  ----------------------------<  152<H>  4.0   |  28.0  |  0.49<L>    Ca    8.3<L>      22 Nov 2020 04:18  Phos  3.5     11-22  Mg     2.0     11-22    TPro  4.7<L>  /  Alb  2.0<L>  /  TBili  1.2  /  DBili  x   /  AST  84<H>  /  ALT  139<H>  /  AlkPhos  226<H>  11-22        PT/INR - ( 21 Nov 2020 04:06 )   PT: 17.2 sec;   INR: 1.51 ratio         PTT - ( 21 Nov 2020 04:06 )  PTT:21.7 sec          RADIOLOGY & ADDITIONAL STUDIES: (reviewed)  CXR was independently visualized/reviewed  and demonstrated: L sided effusion vs. 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 1000 milliLiter(s) (50 mL/Hr) IV Continuous <Continuous>  dextrose 5%. 1000 milliLiter(s) (100 mL/Hr) IV Continuous <Continuous>  dextrose 50% Injectable 50 milliLiter(s) IV Push every 15 minutes  digoxin     Tablet 0.125 milliGRAM(s) Oral daily  doxazosin 1 milliGRAM(s) Oral at bedtime  glucagon  Injectable 1 milliGRAM(s) IntraMuscular once  hydrocortisone sodium succinate Injectable 50 milliGRAM(s) IV Push daily  insulin glargine Injectable (LANTUS) 15 Unit(s) SubCutaneous two times a day  insulin lispro (ADMELOG) corrective regimen sliding scale   SubCutaneous every 6 hours  insulin lispro Injectable (ADMELOG) 2 Unit(s) SubCutaneous every 6 hours  lactobacillus acidophilus 1 Tablet(s) Oral daily  levETIRAcetam  IVPB 500 milliGRAM(s) IV Intermittent every 12 hours  meropenem  IVPB 2000 milliGRAM(s) IV Intermittent every 8 hours  metoprolol tartrate 50 milliGRAM(s) Enteral Tube every 6 hours  pantoprazole  Injectable 40 milliGRAM(s) IV Push daily  potassium iodide 10%/iodine 5% Oral Liquid - Peds 0.25 milliLiter(s) Oral every 6 hours  propylthiouracil 200 milliGRAM(s) Oral every 8 hours  psyllium Powder 1 Packet(s) Oral daily  vancomycin  IVPB 500 milliGRAM(s) IV Intermittent every 12 hours

## 2020-11-22 NOTE — PROGRESS NOTE ADULT - SUBJECTIVE AND OBJECTIVE BOX
Patient is a 80y old  Female who presents with a chief complaint of AMS (22 Nov 2020 09:03)      HPI/BRIEF HOSPITAL COURSE:   80F with PMHx of DM, HTN, PPM, hyperthyroidism who presented with AMS/frequent falls. Found to have SVT. TSH undetectable. Started on PTU, steroids. Worsening AMS while on floor intubated for hypoxic respiratory failure due to pulm edema. Transferred to ICU. Course further complicated by afib with RVR, ongoing AMS, VASHTI, anemia.    Events last 24 hours:  Diarrhea+  No change in poor mental status  Na improving and BS improving with mx        PAST MEDICAL & SURGICAL HISTORY:  HTN (hypertension)    DM (diabetes mellitus)    Pacemaker        Could not obtain ROS due to underlying mentation      Physical Examination:    ICU Vital Signs Last 24 Hrs  T(C): 37 (22 Nov 2020 08:00), Max: 37.3 (22 Nov 2020 00:07)  T(F): 98.6 (22 Nov 2020 08:00), Max: 99.1 (22 Nov 2020 00:07)  HR: 60 (22 Nov 2020 10:00) (60 - 80)  BP: 174/66 (22 Nov 2020 10:00) (122/50 - 177/65)  BP(mean): 95 (22 Nov 2020 10:00) (70 - 97)  ABP: --  ABP(mean): --  RR: 20 (22 Nov 2020 10:00) (20 - 22)  SpO2: 100% (22 Nov 2020 10:00) (98% - 100%)      General: No acute distress.  Intubated     Neuro: minimal eye opening only to deep painful stimuli with no other responses    HEENT: Pupils equal, reactive to light, Oral mucosa moist    PULM: Clear to auscultation bilaterally except decreased at bases, no significant adventitious breath sounds     CVS: Regular rhythm and controlled rate, no murmurs, rubs, or gallops    ABD: Soft, nondistended, nontender, normoactive bowel sounds, no CVA tenderness    EXT: ankle b/l LE edema, nontender with pedal pulse palpable     SKIN: Warm and well perfused, no acute rashes       Medications:  meropenem  IVPB 2000 milliGRAM(s) IV Intermittent every 8 hours  vancomycin  IVPB 500 milliGRAM(s) IV Intermittent every 12 hours  digoxin     Tablet 0.125 milliGRAM(s) Oral daily  doxazosin 1 milliGRAM(s) Oral at bedtime  metoprolol tartrate 50 milliGRAM(s) Enteral Tube every 6 hours  albuterol/ipratropium for Nebulization. 3 milliLiter(s) Nebulizer every 6 hours PRN  acetaminophen    Suspension .. 650 milliGRAM(s) Oral every 6 hours PRN  levETIRAcetam  IVPB 500 milliGRAM(s) IV Intermittent every 12 hours  pantoprazole  Injectable 40 milliGRAM(s) IV Push daily  psyllium Powder 1 Packet(s) Oral daily  dextrose 40% Gel 15 Gram(s) Oral once  dextrose 40% Gel 15 Gram(s) Oral once  dextrose 50% Injectable 50 milliLiter(s) IV Push every 15 minutes  glucagon  Injectable 1 milliGRAM(s) IntraMuscular once  hydrocortisone sodium succinate Injectable 50 milliGRAM(s) IV Push daily  insulin glargine Injectable (LANTUS) 15 Unit(s) SubCutaneous two times a day  insulin lispro (ADMELOG) corrective regimen sliding scale   SubCutaneous every 6 hours  insulin lispro Injectable (ADMELOG) 2 Unit(s) SubCutaneous every 6 hours  propylthiouracil 200 milliGRAM(s) Oral every 8 hours  dextrose 5%. 1000 milliLiter(s) IV Continuous <Continuous>  dextrose 5%. 1000 milliLiter(s) IV Continuous <Continuous>  potassium iodide 10%/iodine 5% Oral Liquid - Peds 0.25 milliLiter(s) Oral every 6 hours  sodium chloride 0.9% lock flush 10 milliLiter(s) IV Push every 1 hour PRN  chlorhexidine 0.12% Liquid 15 milliLiter(s) Oral Mucosa every 12 hours  lactobacillus acidophilus 1 Tablet(s) Oral daily      Mode: AC/ CMV (Assist Control/ Continuous Mandatory Ventilation)  RR (machine): 20  TV (machine): 380  FiO2: 30  PEEP: 5  MAP: 10  PIP: 26      I&O's Detail    21 Nov 2020 07:01  -  22 Nov 2020 07:00  --------------------------------------------------------  IN:    Enteral Tube Flush: 1200 mL    Glucerna: 920 mL    IV PiggyBack: 250 mL    IV PiggyBack: 200 mL    IV PiggyBack: 300 mL    IV PiggyBack: 500 mL    sodium chloride 0.45%: 2300 mL  Total IN: 5670 mL    OUT:    Voided (mL): 1400 mL  Total OUT: 1400 mL    Total NET: 4270 mL      22 Nov 2020 07:01  -  22 Nov 2020 10:18  --------------------------------------------------------  IN:    Glucerna: 120 mL    Lactated Ringers Bolus: 1000 mL  Total IN: 1120 mL    OUT:    Voided (mL): 650 mL  Total OUT: 650 mL    Total NET: 470 mL    RADIOLOGY/ Microbiology/ Labs: reviewed     I have personally provided 45 minutes of critical care time excluding time spent on separate procedures

## 2020-11-23 LAB
CULTURE RESULTS: NO GROWTH — SIGNIFICANT CHANGE UP
CULTURE RESULTS: SIGNIFICANT CHANGE UP
SPECIMEN SOURCE: SIGNIFICANT CHANGE UP
SPECIMEN SOURCE: SIGNIFICANT CHANGE UP

## 2020-11-24 LAB
CULTURE RESULTS: SIGNIFICANT CHANGE UP
SPECIMEN SOURCE: SIGNIFICANT CHANGE UP

## 2020-11-25 LAB — VDRL CSF-TITR: NEGATIVE — SIGNIFICANT CHANGE UP

## 2020-11-27 LAB
CULTURE RESULTS: SIGNIFICANT CHANGE UP
CULTURE RESULTS: SIGNIFICANT CHANGE UP
SPECIMEN SOURCE: SIGNIFICANT CHANGE UP
SPECIMEN SOURCE: SIGNIFICANT CHANGE UP

## 2020-11-29 LAB
WNV RNA SPEC QL NAA+PROBE: SIGNIFICANT CHANGE UP
WNV RNA SPEC QL NAA+PROBE: SIGNIFICANT CHANGE UP

## 2020-12-01 LAB — B BURGDOR DNA SPEC QL NAA+PROBE: NEGATIVE — SIGNIFICANT CHANGE UP

## 2020-12-28 PROCEDURE — 86901 BLOOD TYPING SEROLOGIC RH(D): CPT

## 2020-12-28 PROCEDURE — 84481 FREE ASSAY (FT-3): CPT

## 2020-12-28 PROCEDURE — 82945 GLUCOSE OTHER FLUID: CPT

## 2020-12-28 PROCEDURE — 95711 VEEG 2-12 HR UNMONITORED: CPT

## 2020-12-28 PROCEDURE — 71250 CT THORAX DX C-: CPT

## 2020-12-28 PROCEDURE — 74176 CT ABD & PELVIS W/O CONTRAST: CPT

## 2020-12-28 PROCEDURE — 95700 EEG CONT REC W/VID EEG TECH: CPT

## 2020-12-28 PROCEDURE — 85610 PROTHROMBIN TIME: CPT

## 2020-12-28 PROCEDURE — 96361 HYDRATE IV INFUSION ADD-ON: CPT

## 2020-12-28 PROCEDURE — 85018 HEMOGLOBIN: CPT

## 2020-12-28 PROCEDURE — 93970 EXTREMITY STUDY: CPT

## 2020-12-28 PROCEDURE — 84443 ASSAY THYROID STIM HORMONE: CPT

## 2020-12-28 PROCEDURE — 82553 CREATINE MB FRACTION: CPT

## 2020-12-28 PROCEDURE — 86592 SYPHILIS TEST NON-TREP QUAL: CPT

## 2020-12-28 PROCEDURE — 99285 EMERGENCY DEPT VISIT HI MDM: CPT | Mod: 25

## 2020-12-28 PROCEDURE — 84484 ASSAY OF TROPONIN QUANT: CPT

## 2020-12-28 PROCEDURE — 86900 BLOOD TYPING SEROLOGIC ABO: CPT

## 2020-12-28 PROCEDURE — 85027 COMPLETE CBC AUTOMATED: CPT

## 2020-12-28 PROCEDURE — 96365 THER/PROPH/DIAG IV INF INIT: CPT

## 2020-12-28 PROCEDURE — 82803 BLOOD GASES ANY COMBINATION: CPT

## 2020-12-28 PROCEDURE — 94760 N-INVAS EAR/PLS OXIMETRY 1: CPT

## 2020-12-28 PROCEDURE — 93005 ELECTROCARDIOGRAM TRACING: CPT

## 2020-12-28 PROCEDURE — 82962 GLUCOSE BLOOD TEST: CPT

## 2020-12-28 PROCEDURE — 82140 ASSAY OF AMMONIA: CPT

## 2020-12-28 PROCEDURE — 89051 BODY FLUID CELL COUNT: CPT

## 2020-12-28 PROCEDURE — 82550 ASSAY OF CK (CPK): CPT

## 2020-12-28 PROCEDURE — 83735 ASSAY OF MAGNESIUM: CPT

## 2020-12-28 PROCEDURE — 85014 HEMATOCRIT: CPT

## 2020-12-28 PROCEDURE — 94003 VENT MGMT INPAT SUBQ DAY: CPT

## 2020-12-28 PROCEDURE — 87483 CNS DNA AMP PROBE TYPE 12-25: CPT

## 2020-12-28 PROCEDURE — 84295 ASSAY OF SERUM SODIUM: CPT

## 2020-12-28 PROCEDURE — 94640 AIRWAY INHALATION TREATMENT: CPT

## 2020-12-28 PROCEDURE — 82607 VITAMIN B-12: CPT

## 2020-12-28 PROCEDURE — 87798 DETECT AGENT NOS DNA AMP: CPT

## 2020-12-28 PROCEDURE — 85025 COMPLETE CBC W/AUTO DIFF WBC: CPT

## 2020-12-28 PROCEDURE — P9016: CPT

## 2020-12-28 PROCEDURE — 80048 BASIC METABOLIC PNL TOTAL CA: CPT

## 2020-12-28 PROCEDURE — 84439 ASSAY OF FREE THYROXINE: CPT

## 2020-12-28 PROCEDURE — 80061 LIPID PANEL: CPT

## 2020-12-28 PROCEDURE — 87205 SMEAR GRAM STAIN: CPT

## 2020-12-28 PROCEDURE — 84100 ASSAY OF PHOSPHORUS: CPT

## 2020-12-28 PROCEDURE — 82435 ASSAY OF BLOOD CHLORIDE: CPT

## 2020-12-28 PROCEDURE — 36600 WITHDRAWAL OF ARTERIAL BLOOD: CPT

## 2020-12-28 PROCEDURE — 87476 LYME DIS DNA AMP PROBE: CPT

## 2020-12-28 PROCEDURE — 86923 COMPATIBILITY TEST ELECTRIC: CPT

## 2020-12-28 PROCEDURE — 85730 THROMBOPLASTIN TIME PARTIAL: CPT

## 2020-12-28 PROCEDURE — 87449 NOS EACH ORGANISM AG IA: CPT

## 2020-12-28 PROCEDURE — 36415 COLL VENOUS BLD VENIPUNCTURE: CPT

## 2020-12-28 PROCEDURE — 87070 CULTURE OTHR SPECIMN AEROBIC: CPT

## 2020-12-28 PROCEDURE — 36430 TRANSFUSION BLD/BLD COMPNT: CPT

## 2020-12-28 PROCEDURE — 87635 SARS-COV-2 COVID-19 AMP PRB: CPT

## 2020-12-28 PROCEDURE — 82947 ASSAY GLUCOSE BLOOD QUANT: CPT

## 2020-12-28 PROCEDURE — 84436 ASSAY OF TOTAL THYROXINE: CPT

## 2020-12-28 PROCEDURE — 70450 CT HEAD/BRAIN W/O DYE: CPT

## 2020-12-28 PROCEDURE — 84132 ASSAY OF SERUM POTASSIUM: CPT

## 2020-12-28 PROCEDURE — 83605 ASSAY OF LACTIC ACID: CPT

## 2020-12-28 PROCEDURE — 86769 SARS-COV-2 COVID-19 ANTIBODY: CPT

## 2020-12-28 PROCEDURE — 82272 OCCULT BLD FECES 1-3 TESTS: CPT

## 2020-12-28 PROCEDURE — 80076 HEPATIC FUNCTION PANEL: CPT

## 2020-12-28 PROCEDURE — 80177 DRUG SCRN QUAN LEVETIRACETAM: CPT

## 2020-12-28 PROCEDURE — 87116 MYCOBACTERIA CULTURE: CPT

## 2020-12-28 PROCEDURE — 87493 C DIFF AMPLIFIED PROBE: CPT

## 2020-12-28 PROCEDURE — 72125 CT NECK SPINE W/O DYE: CPT

## 2020-12-28 PROCEDURE — 82330 ASSAY OF CALCIUM: CPT

## 2020-12-28 PROCEDURE — 83036 HEMOGLOBIN GLYCOSYLATED A1C: CPT

## 2020-12-28 PROCEDURE — 80053 COMPREHEN METABOLIC PANEL: CPT

## 2020-12-28 PROCEDURE — 95710 EEG W/O VID EA 12-26HR CONT: CPT

## 2020-12-28 PROCEDURE — 84145 PROCALCITONIN (PCT): CPT

## 2020-12-28 PROCEDURE — 81001 URINALYSIS AUTO W/SCOPE: CPT

## 2020-12-28 PROCEDURE — 95714 VEEG EA 12-26 HR UNMNTR: CPT

## 2020-12-28 PROCEDURE — 94002 VENT MGMT INPAT INIT DAY: CPT

## 2020-12-28 PROCEDURE — 87040 BLOOD CULTURE FOR BACTERIA: CPT

## 2020-12-28 PROCEDURE — 80202 ASSAY OF VANCOMYCIN: CPT

## 2020-12-28 PROCEDURE — 84480 ASSAY TRIIODOTHYRONINE (T3): CPT

## 2020-12-28 PROCEDURE — 86850 RBC ANTIBODY SCREEN: CPT

## 2020-12-28 PROCEDURE — 87507 IADNA-DNA/RNA PROBE TQ 12-25: CPT

## 2020-12-28 PROCEDURE — 83615 LACTATE (LD) (LDH) ENZYME: CPT

## 2020-12-28 PROCEDURE — 84157 ASSAY OF PROTEIN OTHER: CPT

## 2020-12-28 PROCEDURE — 83880 ASSAY OF NATRIURETIC PEPTIDE: CPT

## 2020-12-28 PROCEDURE — 71045 X-RAY EXAM CHEST 1 VIEW: CPT

## 2020-12-28 PROCEDURE — 87086 URINE CULTURE/COLONY COUNT: CPT

## 2020-12-28 PROCEDURE — C8929: CPT

## 2021-01-09 LAB
CULTURE RESULTS: SIGNIFICANT CHANGE UP
SPECIMEN SOURCE: SIGNIFICANT CHANGE UP

## 2025-06-16 NOTE — ED ADULT NURSE NOTE - FALL HARM RISK TYPE OF ASSESSMENT
Health Maintenance       Shingles Vaccine (2 of 2)  Overdue since 4/17/2025    Microalbumin Ratio (Yearly)  Never done    Hepatitis B Vaccine (1 of 3 - 19+ 3-dose series)  Never done    COVID-19 Vaccine (1 - 2024-25 season)  Never done           Following review of the above:  Patient wishes to discuss with clinician: follow up and right side pain  Patient is not proceeding with: COVID-19, Hep B, and Shingles    Note: Refer to final orders and clinician documentation.         Review Flowsheet  More data exists         6/16/2025   PHQ 2/9 Score   Adult PHQ 2 Score 1   Adult PHQ 2 Interpretation No further screening needed   Little interest or pleasure in activity? Several days   Feeling down, depressed or hopeless? Not at all       Daily Assessment